# Patient Record
Sex: FEMALE | Race: WHITE | Employment: OTHER | URBAN - METROPOLITAN AREA
[De-identification: names, ages, dates, MRNs, and addresses within clinical notes are randomized per-mention and may not be internally consistent; named-entity substitution may affect disease eponyms.]

---

## 2017-09-13 PROBLEM — S62.346A CLOSED NONDISPLACED FRACTURE OF BASE OF FIFTH METACARPAL BONE OF RIGHT HAND: Status: ACTIVE | Noted: 2017-09-13

## 2017-09-13 PROBLEM — S62.348B: Status: ACTIVE | Noted: 2017-09-13

## 2017-10-20 PROBLEM — M75.41 IMPINGEMENT SYNDROME OF RIGHT SHOULDER: Status: ACTIVE | Noted: 2017-10-20

## 2018-05-24 ENCOUNTER — OFFICE VISIT (OUTPATIENT)
Dept: BARIATRICS/WEIGHT MGMT | Age: 63
End: 2018-05-24
Payer: COMMERCIAL

## 2018-05-24 VITALS
TEMPERATURE: 97.8 F | SYSTOLIC BLOOD PRESSURE: 167 MMHG | BODY MASS INDEX: 30.56 KG/M2 | HEIGHT: 64 IN | WEIGHT: 179 LBS | DIASTOLIC BLOOD PRESSURE: 84 MMHG | RESPIRATION RATE: 20 BRPM | HEART RATE: 64 BPM

## 2018-05-24 DIAGNOSIS — K91.2 MALNUTRITION FOLLOWING GASTROINTESTINAL SURGERY: Primary | ICD-10-CM

## 2018-05-24 PROCEDURE — 99213 OFFICE O/P EST LOW 20 MIN: CPT | Performed by: SURGERY

## 2018-05-24 PROCEDURE — 99212 OFFICE O/P EST SF 10 MIN: CPT

## 2019-01-24 ENCOUNTER — PROCEDURE VISIT (OUTPATIENT)
Dept: AUDIOLOGY | Age: 64
End: 2019-01-24
Payer: COMMERCIAL

## 2019-01-24 ENCOUNTER — OFFICE VISIT (OUTPATIENT)
Dept: ENT CLINIC | Age: 64
End: 2019-01-24
Payer: COMMERCIAL

## 2019-01-24 VITALS
SYSTOLIC BLOOD PRESSURE: 107 MMHG | OXYGEN SATURATION: 98 % | HEART RATE: 69 BPM | WEIGHT: 171 LBS | DIASTOLIC BLOOD PRESSURE: 69 MMHG | HEIGHT: 65 IN | BODY MASS INDEX: 28.49 KG/M2

## 2019-01-24 DIAGNOSIS — H90.3 SENSORINEURAL HEARING LOSS (SNHL) OF BOTH EARS: Primary | ICD-10-CM

## 2019-01-24 DIAGNOSIS — H93.13 TINNITUS, BILATERAL: Primary | ICD-10-CM

## 2019-01-24 PROCEDURE — 99204 OFFICE O/P NEW MOD 45 MIN: CPT | Performed by: OTOLARYNGOLOGY

## 2019-01-24 PROCEDURE — 92567 TYMPANOMETRY: CPT | Performed by: AUDIOLOGIST

## 2019-01-24 PROCEDURE — 92557 COMPREHENSIVE HEARING TEST: CPT | Performed by: AUDIOLOGIST

## 2019-01-24 RX ORDER — KETOTIFEN FUMARATE 0.35 MG/ML
1 SOLUTION/ DROPS OPHTHALMIC 2 TIMES DAILY
COMMUNITY

## 2019-01-24 ASSESSMENT — ENCOUNTER SYMPTOMS
ABDOMINAL PAIN: 0
EYES NEGATIVE: 1
GASTROINTESTINAL NEGATIVE: 1
SHORTNESS OF BREATH: 0
COLOR CHANGE: 0
RESPIRATORY NEGATIVE: 1

## 2019-05-01 ENCOUNTER — TELEPHONE (OUTPATIENT)
Dept: BARIATRICS/WEIGHT MGMT | Age: 64
End: 2019-05-01

## 2019-05-01 NOTE — TELEPHONE ENCOUNTER
I LM for pt to call us back so we could reschedule her appt. . She had LM for us to cancel her 5/23/19 appt with Dr Mode Johnson.

## 2019-05-15 ENCOUNTER — TELEPHONE (OUTPATIENT)
Dept: BARIATRICS/WEIGHT MGMT | Age: 64
End: 2019-05-15

## 2019-06-28 ENCOUNTER — TELEPHONE (OUTPATIENT)
Dept: BARIATRICS/WEIGHT MGMT | Age: 64
End: 2019-06-28

## 2019-06-28 NOTE — LETTER
[facility] Surg Weight   [address] [facility]  Phone: [phone]  Fax: [phone]    Karissa Vargas MD        June 28, 2019    Karen Masters  [address] [place] [id number]      Dear Vlad Trimble: We are reaching out to patients who have missed appointments and are due for bariatric lab work and/or other testing. We hope you are doing well, and would like to schedule a follow up appointment the [facility]. It is very important that you maintain follow up care with your surgeon, and have your routine lab work completed after weight loss surgery. Failure to maintain follow up places you at  increased risk of sub-optimal weight loss or complications. We are here to work with you and guide you to weight loss success. Please contact our office to schedule your follow up appointment. We have appointments available on Wednesday afternoon with Dr. Margarette Galeana, Thursday morning with Dr. Emanuel Brandon, and Friday morning with Dr. Bigg Emery. We will work with you to obtain at date and time that is convenient for you. If you are receiving your bariatric care elsewhere, please notify our office so that we may kamran your records accordingly. We look forward to hearing from you soon. If you have any questions or concerns, please don't hesitate to call.     Sincerely,        Karissa Vargas MD

## 2019-11-19 ENCOUNTER — TELEPHONE (OUTPATIENT)
Dept: BARIATRICS/WEIGHT MGMT | Age: 64
End: 2019-11-19

## 2019-11-25 DIAGNOSIS — K91.2 MALNUTRITION FOLLOWING GASTROINTESTINAL SURGERY: Primary | ICD-10-CM

## 2020-04-13 ENCOUNTER — TELEPHONE (OUTPATIENT)
Dept: BARIATRICS/WEIGHT MGMT | Age: 65
End: 2020-04-13

## 2020-04-13 NOTE — LETTER
[facility] Surg Weight   [address] [facility] [place]  Phone: [phone]  Fax: [phone]    Teodoro Fernandes MD        April 13, 2020    [id number]      Dear Connie Chen:    Kathy Brar are now due for your 5 year appointment as well as bariatric lab work. We hope you are doing well, and would like to schedule a follow up appointment the [facility]. It is very important that you maintain follow up care with your surgeon, and have your routine lab work completed after weight loss surgery. Failure to maintain follow up places you at  increased risk of sub-optimal weight loss or complications. The routine schedule of follow up appointments and labwork after bariatric surgery is as follows:  2 weeks, 6 weeks, 3 months, 6 months, 1 year, 18 months, 2 years and then annually thereafter. We are here to work with you and guide you to weight loss success. Please contact our office to schedule your follow up appointment. We have appointments available on Wednesday afternoon with Dr. Rayo Malave, Thursday morning with Dr. He Deluna, and Friday morning with Dr. Dae Easley. We will work with you to obtain at date and time that is convenient for you. If you are receiving your bariatric care elsewhere, please notify our office so that we may kamran your records accordingly. We look forward to hearing from you soon. If you have any questions or concerns, please don't hesitate to call.     Sincerely,        Teodoro eFrnandes MD

## 2020-09-03 ENCOUNTER — TELEPHONE (OUTPATIENT)
Dept: BARIATRICS/WEIGHT MGMT | Age: 65
End: 2020-09-03

## 2020-09-03 ENCOUNTER — OFFICE VISIT (OUTPATIENT)
Dept: BARIATRICS/WEIGHT MGMT | Age: 65
End: 2020-09-03
Payer: COMMERCIAL

## 2020-09-03 VITALS
WEIGHT: 162.5 LBS | SYSTOLIC BLOOD PRESSURE: 174 MMHG | TEMPERATURE: 97.5 F | RESPIRATION RATE: 20 BRPM | HEART RATE: 60 BPM | HEIGHT: 64 IN | DIASTOLIC BLOOD PRESSURE: 74 MMHG | BODY MASS INDEX: 27.74 KG/M2

## 2020-09-03 PROCEDURE — 99213 OFFICE O/P EST LOW 20 MIN: CPT | Performed by: SURGERY

## 2020-09-03 PROCEDURE — 99211 OFF/OP EST MAY X REQ PHY/QHP: CPT

## 2020-09-03 RX ORDER — ATORVASTATIN CALCIUM 40 MG/1
40 TABLET, FILM COATED ORAL DAILY
COMMUNITY

## 2020-09-03 NOTE — PATIENT INSTRUCTIONS
Please continue to take your vitamin and mineral supplements as instructed. If you received a blood work prescription today for laboratory monitoring due prior to your next routine follow-up visit, please have this blood work obtained 10 to 14 days prior to your next visit. It is important to fast for 12 hours prior to routine weight loss surgery blood work, EXCEPT for drinking water, to ensure accuracy of results. Please report nausea, vomiting, abdominal pain, or any other problems you experience to your surgeon. For problems related to weight loss surgery, it is best to go to [facility] Emergency Department and have your surgeon paged.

## 2020-09-03 NOTE — PROGRESS NOTES
Kiara Hernández  9/3/2020  [address]     Bariatric Office Visit    Kiara Hernández is a 63 y.o. female who weighs 162 lb 8 oz (73.7 kg) 9/3/2020 post Laparoscopic Odalys-en-Y Gastric Bypass 12/8/14. No problems this year other than low iron, doesn't like the iron pills so she was given two IV iron treatments, no labs available. Colonoscopy 2017 was normal.  Reports no problems from the bypass. Had liposuction and arm plastic surgery 2018. She is compliant most of the time with the multivitamins and calcium + Vit D. She is meeting fluid recommendations of at least 64 ounces per day and is meeting protein recommendations. She is exercising: walking. Weights:   162 lb 9/3/2020   179 lb 5/2018  214 lb 12/8/2014 bypass  230 lb 5/2014 initial    Past medical history:  has a past medical history of Arthritis, Chronic back pain, Chronic hip pain, Constipation, H/O chest x-ray, Hyperlipidemia, Hypertension, Nausea & vomiting, PONV (postoperative nausea and vomiting), Ringing in ears, Tension headache, and Type II or unspecified type diabetes mellitus without mention of complication, not stated as uncontrolled. Past surgical history:  has a past surgical history that includes Colonoscopy (2007); Hysterectomy (2005,Dr. Velasquez,[facility]); Total knee arthroplasty (left,2007, Dr. Burak Alvarez); Carpal tunnel release (Bilateral, 2007); joint replacement (2007); Tonsillectomy (1962); Upper gastrointestinal endoscopy (7/9/2014); Cholecystectomy (Aug 2014); Odalys-en-Y Gastric Bypass (12/08/2014); and Total knee arthroplasty (Right, 02/14/2011). Medications:   Prior to Visit Medications    Medication Sig Taking? Authorizing Provider   medical marijuana Take by mouth as needed.  Yes Historical Provider, MD   atorvastatin (LIPITOR) 40 MG tablet Take 40 mg by mouth daily Yes Historical Provider, MD   Ferrous Sulfate (IRON PO) Take 2 tablets by mouth daily Yes Historical Provider, MD   ketotifen (ZADITOR) 0.025 % ophthalmic solution 1 drop 2 times daily Yes Historical Provider, MD   BLACK CURRANT SEED OIL PO Take by mouth daily Yes Historical Provider, MD   omeprazole (PRILOSEC) 20 MG delayed release capsule Take 20 mg by mouth daily as needed  Yes Historical Provider, MD   Multiple Vitamin (MVI, BARIATRIC ADVANTAGE MULTI-FORMULA, CHEW TAB) Take 1 tablet by mouth 2 times daily. Yes Historical Provider, MD   Calcium Citrate-Vitamin D (CA CITR+VIT D,CELEBRATE CALCIUM PLUS 500, TAB) 1 tablet 3 times daily. Yes Historical Provider, MD   Cholecalciferol (VITAMIN D PO) Take 5,000 Units by mouth every other day. Bariatric Yes Historical Provider, MD     Allergies: Allergies   Allergen Reactions    Penicillins Hives and Rash    Sulfa Antibiotics Hives and Rash          Physical exam:   Blood pressure (!) 174/74, pulse 60, temperature 97.5 °F (36.4 °C), temperature source Temporal, resp. rate 20, height 5' 4\" (1.626 m), weight 162 lb 8 oz (73.7 kg). General appearance: alert, appears stated age and cooperative  Head: Normocephalic, without obvious abnormality, atraumatic  Neck: no adenopathy, no carotid bruit, no JVD, supple, symmetrical, trachea midline and thyroid not enlarged, symmetric, no tenderness/mass/nodules  Lungs: clear to auscultation bilaterally  Heart: regular rate and rhythm  Abdomen: soft, non-tender; bowel sounds normal; no masses,  no organomegaly  Extremities: extremities normal, atraumatic, no cyanosis or edema    Assessment: Post Odalys-en- Y Gastric Bypass. She does not complain of GERD unless she takes iron pills,  Never had sleep apnea,  No longer has diabetes,  No longer has hypertension off medical treatment. No constipation. Malnutrition post gastric bypass. Plan:  Need to get back the low calorie, high protein diet. Drink plenty of water and fluids. Make sure to use fiber to keep the bowels regular. Try to exercise 7 days per week.  Always notify the clinic if you have any medical problems. Follow up in 12 months. Repeat colonoscopy in 10 years.       Physician Signature: Electronically signed by Dr. Jose Chaudhary MD

## 2020-09-03 NOTE — PROGRESS NOTES
Pt is here for 5.5 yr LRYGB, labs are scanned into media. Water intake is 32 oz daily, vitamin intake is good, not having regular bowel movements, getting protein through shakes and foods.

## 2020-09-03 NOTE — TELEPHONE ENCOUNTER
Ab Crespo called pt and reviewed we did receive her labs. Ab Crespo reviewed Glucose and Creat. are a little elevated so we want pt to call and discuss her lab values with her PCP. Ab Crespo reviewed labs have been faxed to PCP 's office. Ab Crespo reviewed Vitamin B1 and Vitamin B12 are also elevated. Pt takes an additional Vitamin B12 daily. Ab Crespo instructed pt to hold the Vitamin B12. Pt verbalized understanding. Labs faxed to PCP.

## 2020-09-04 ENCOUNTER — TELEPHONE (OUTPATIENT)
Dept: BARIATRICS/WEIGHT MGMT | Age: 65
End: 2020-09-04

## 2020-10-23 PROBLEM — E44.1 MILD PROTEIN-CALORIE MALNUTRITION (HCC): Status: ACTIVE | Noted: 2020-10-23

## 2021-10-30 ENCOUNTER — HOSPITAL ENCOUNTER (OUTPATIENT)
Age: 66
Discharge: HOME | End: 2021-10-30
Payer: COMMERCIAL

## 2021-10-30 LAB
ALBUMIN SERPL-MCNC: 4.3 G/DL (ref 3.5–5.2)
ALP BLD-CCNC: 99 U/L (ref 35–104)
ALT SERPL-CCNC: 32 U/L (ref 0–32)
ANION GAP SERPL CALCULATED.3IONS-SCNC: 12 MMOL/L (ref 7–16)
AST SERPL-CCNC: 30 U/L (ref 0–31)
BILIRUB SERPL-MCNC: 0.4 MG/DL (ref 0–1.2)
BUN BLDV-MCNC: 19 MG/DL (ref 6–23)
CALCIUM SERPL-MCNC: 10 MG/DL (ref 8.6–10.2)
CHLORIDE BLD-SCNC: 103 MMOL/L (ref 98–107)
CHOLESTEROL, TOTAL: 176 MG/DL (ref 0–199)
CO2: 26 MMOL/L (ref 22–29)
CREAT SERPL-MCNC: 1.1 MG/DL (ref 0.5–1)
FERRITIN: 209 NG/ML
FOLATE: 12.3 NG/ML (ref 4.8–24.2)
GFR AFRICAN AMERICAN: >60
GFR NON-AFRICAN AMERICAN: 50 ML/MIN/1.73
GLUCOSE BLD-MCNC: 166 MG/DL (ref 74–99)
HCT VFR BLD CALC: 39.1 % (ref 34–48)
HEMOGLOBIN: 12.4 G/DL (ref 11.5–15.5)
MCH RBC QN AUTO: 30.5 PG (ref 26–35)
MCHC RBC AUTO-ENTMCNC: 31.7 % (ref 32–34.5)
MCV RBC AUTO: 96.1 FL (ref 80–99.9)
PDW BLD-RTO: 12.3 FL (ref 11.5–15)
PLATELET # BLD: 226 E9/L (ref 130–450)
PMV BLD AUTO: 10.1 FL (ref 7–12)
POTASSIUM SERPL-SCNC: 4.8 MMOL/L (ref 3.5–5)
PREALBUMIN: 23 MG/DL (ref 20–40)
RBC # BLD: 4.07 E12/L (ref 3.5–5.5)
SODIUM BLD-SCNC: 141 MMOL/L (ref 132–146)
TOTAL PROTEIN: 7.2 G/DL (ref 6.4–8.3)
TRIGL SERPL-MCNC: 165 MG/DL (ref 0–149)
VITAMIN B-12: >2000 PG/ML (ref 211–946)
VITAMIN D 25-HYDROXY: 35 NG/ML (ref 30–100)
WBC # BLD: 4.9 E9/L (ref 4.5–11.5)

## 2021-10-30 PROCEDURE — 82607 VITAMIN B-12: CPT

## 2021-10-30 PROCEDURE — 80053 COMPREHEN METABOLIC PANEL: CPT

## 2021-10-30 PROCEDURE — 84630 ASSAY OF ZINC: CPT

## 2021-10-30 PROCEDURE — 84425 ASSAY OF VITAMIN B-1: CPT

## 2021-10-30 PROCEDURE — 84255 ASSAY OF SELENIUM: CPT

## 2021-10-30 PROCEDURE — 82525 ASSAY OF COPPER: CPT

## 2021-10-30 PROCEDURE — 82728 ASSAY OF FERRITIN: CPT

## 2021-10-30 PROCEDURE — 82746 ASSAY OF FOLIC ACID SERUM: CPT

## 2021-10-30 PROCEDURE — 82465 ASSAY BLD/SERUM CHOLESTEROL: CPT

## 2021-10-30 PROCEDURE — 82306 VITAMIN D 25 HYDROXY: CPT

## 2021-10-30 PROCEDURE — 84478 ASSAY OF TRIGLYCERIDES: CPT

## 2021-10-30 PROCEDURE — 36415 COLL VENOUS BLD VENIPUNCTURE: CPT

## 2021-10-30 PROCEDURE — 85027 COMPLETE CBC AUTOMATED: CPT

## 2021-10-30 PROCEDURE — 84134 ASSAY OF PREALBUMIN: CPT

## 2021-11-04 LAB
COPPER: 98.6 UG/DL (ref 80–155)
SELENIUM: 174.5 UG/L (ref 23–190)
VITAMIN B1 WHOLE BLOOD: 140 NMOL/L (ref 70–180)
ZINC: 115.7 UG/DL (ref 60–120)

## 2021-11-18 ENCOUNTER — OFFICE VISIT (OUTPATIENT)
Dept: BARIATRICS/WEIGHT MGMT | Age: 66
End: 2021-11-18
Payer: COMMERCIAL

## 2021-11-18 VITALS
TEMPERATURE: 97.5 F | WEIGHT: 170 LBS | SYSTOLIC BLOOD PRESSURE: 183 MMHG | BODY MASS INDEX: 29.02 KG/M2 | HEIGHT: 64 IN | RESPIRATION RATE: 20 BRPM | HEART RATE: 62 BPM | DIASTOLIC BLOOD PRESSURE: 76 MMHG

## 2021-11-18 DIAGNOSIS — K91.2 MALNUTRITION FOLLOWING GASTROINTESTINAL SURGERY: Primary | ICD-10-CM

## 2021-11-18 PROCEDURE — 99213 OFFICE O/P EST LOW 20 MIN: CPT | Performed by: SURGERY

## 2021-11-18 PROCEDURE — 99211 OFF/OP EST MAY X REQ PHY/QHP: CPT

## 2021-11-18 NOTE — PROGRESS NOTES
Patient is 9 yr LRYGB. Water intake is around 32 oz daily. Protein through shakes and foods. Vitamin intake is getting better. Bowel movements are - getting better.

## 2021-11-18 NOTE — PROGRESS NOTES
Farhan Joseph  11/18/2021  [address]     Bariatric Office Visit    Farhan Joseph is a 61 y.o. female who weighs 170 lb (77.1 kg) post Laparoscopic Odalys-en-Y Gastric Bypass 12/8/14. Still using Omeprazole occasionally for abdominal discomfort. No problems this year other than low iron, doesn't like the iron pills so she was given two IV iron treatments, no labs available. Colonoscopy 2017 was normal.  Reports no problems from the bypass. Had liposuction and arm plastic surgery 2018. She is compliant most of the time with the multivitamins and calcium + Vit D. She is meeting fluid recommendations of at least 64 ounces per day and is meeting protein recommendations. She is exercising: walking. Weights:   170 lb 11/18/2021   162 lb 9/3/2020   179 lb 5/2018  214 lb 12/8/2014 bypass  230 lb 5/2014 initial    Past medical history:  has a past medical history of Arthritis, Chronic back pain, Chronic hip pain, Constipation, H/O chest x-ray, Hyperlipidemia, Hypertension, Nausea & vomiting, PONV (postoperative nausea and vomiting), Ringing in ears, Tension headache, and Type II or unspecified type diabetes mellitus without mention of complication, not stated as uncontrolled. Past surgical history:  has a past surgical history that includes Colonoscopy (2007); Hysterectomy ([facility],Dr. Velasquez,[facility]); Total knee arthroplasty (left,2007, Dr. Maryanna Collet); Carpal tunnel release (Bilateral, 2007); joint replacement (2007); Tonsillectomy (1962); Upper gastrointestinal endoscopy (7/9/2014); Cholecystectomy (Aug 2014); Odalys-en-Y Gastric Bypass (12/08/2014); and Total knee arthroplasty (Right, 02/14/2011). Medications:   Prior to Visit Medications    Medication Sig Taking? Authorizing Provider   medical marijuana Take by mouth as needed.  Yes Historical Provider, MD   atorvastatin (LIPITOR) 40 MG tablet Take 40 mg by mouth daily Yes Historical Provider, MD   Ferrous Sulfate (IRON PO) Take 2 tablets by mouth daily Yes Historical Provider, MD   ketotifen (ZADITOR) 0.025 % ophthalmic solution 1 drop 2 times daily Yes Historical Provider, MD   BLACK CURRANT SEED OIL PO Take by mouth daily Yes Historical Provider, MD   omeprazole (PRILOSEC) 20 MG delayed release capsule Take 20 mg by mouth daily as needed  Yes Historical Provider, MD   Multiple Vitamin (MVI, BARIATRIC ADVANTAGE MULTI-FORMULA, CHEW TAB) Take 1 tablet by mouth 2 times daily. Yes Historical Provider, MD   Calcium Citrate-Vitamin D (CA CITR+VIT D,CELEBRATE CALCIUM PLUS 500, TAB) 1 tablet 3 times daily. Yes Historical Provider, MD   Cholecalciferol (VITAMIN D PO) Take 5,000 Units by mouth every other day. Bariatric Yes Historical Provider, MD     Allergies: Allergies   Allergen Reactions    Penicillins Hives and Rash    Sulfa Antibiotics Hives and Rash          Physical exam:   Blood pressure (!) 183/76, pulse 62, temperature 97.5 °F (36.4 °C), temperature source Temporal, resp. rate 20, height 5' 4\" (1.626 m), weight 170 lb (77.1 kg). General appearance: alert, appears stated age and cooperative  Head: Normocephalic, without obvious abnormality, atraumatic  Neck: no adenopathy, no carotid bruit, no JVD, supple, symmetrical, trachea midline and thyroid not enlarged, symmetric, no tenderness/mass/nodules  Lungs: clear to auscultation bilaterally  Heart: regular rate and rhythm  Abdomen: soft, non-tender; bowel sounds normal; no masses,  no organomegaly  Extremities: extremities normal, atraumatic, no cyanosis or edema    Assessment: Post Odalys-en- Y Gastric Bypass. She does complain of GERD unless she takes Omeprazole,  Never had sleep apnea,  No longer has diabetes,  No longer has hypertension off medical treatment. No constipation. Malnutrition post gastric bypass. Plan:  Wean off Omeprazole, use Carafate, Mylanta or Pepcid if needed. Need to get back the low calorie, high protein diet. Drink plenty of water and fluids.  Make sure to use fiber to keep the bowels regular. Try to exercise 7 days per week. Always notify the clinic if you have any medical problems. Follow up in 12 months.        Physician Signature: Electronically signed by Dr. Marcelino Carter MD

## 2021-11-18 NOTE — PATIENT INSTRUCTIONS
Please continue to take your vitamin and mineral supplements as instructed. If you received a blood work prescription today for laboratory monitoring due prior to your next routine follow-up visit, please have this blood work obtained 10 to 14 days prior to your next visit. It is important to fast for 12 hours prior to routine weight loss surgery blood work, EXCEPT for drinking water, to ensure accuracy of results. Please report nausea, vomiting, abdominal pain, or any other problems you experience to your surgeon. For problems related to weight loss surgery, it is best to go to [address] [place] [address] Emergency Department and have your surgeon paged.

## 2022-06-15 ENCOUNTER — OFFICE VISIT (OUTPATIENT)
Dept: ORTHOPEDIC SURGERY | Age: 67
End: 2022-06-15
Payer: MEDICARE

## 2022-06-15 VITALS — WEIGHT: 167 LBS | HEIGHT: 64 IN | BODY MASS INDEX: 28.51 KG/M2

## 2022-06-15 DIAGNOSIS — M25.551 RIGHT HIP PAIN: Primary | ICD-10-CM

## 2022-06-15 PROCEDURE — G8427 DOCREV CUR MEDS BY ELIG CLIN: HCPCS | Performed by: ORTHOPAEDIC SURGERY

## 2022-06-15 PROCEDURE — 1123F ACP DISCUSS/DSCN MKR DOCD: CPT | Performed by: ORTHOPAEDIC SURGERY

## 2022-06-15 PROCEDURE — G8399 PT W/DXA RESULTS DOCUMENT: HCPCS | Performed by: ORTHOPAEDIC SURGERY

## 2022-06-15 PROCEDURE — 99203 OFFICE O/P NEW LOW 30 MIN: CPT | Performed by: ORTHOPAEDIC SURGERY

## 2022-06-15 PROCEDURE — 1090F PRES/ABSN URINE INCON ASSESS: CPT | Performed by: ORTHOPAEDIC SURGERY

## 2022-06-15 PROCEDURE — 3017F COLORECTAL CA SCREEN DOC REV: CPT | Performed by: ORTHOPAEDIC SURGERY

## 2022-06-15 PROCEDURE — G8419 CALC BMI OUT NRM PARAM NOF/U: HCPCS | Performed by: ORTHOPAEDIC SURGERY

## 2022-06-15 PROCEDURE — 1036F TOBACCO NON-USER: CPT | Performed by: ORTHOPAEDIC SURGERY

## 2022-06-15 RX ORDER — VITAMIN E 268 MG
400 CAPSULE ORAL EVERY OTHER DAY
COMMUNITY

## 2022-06-15 RX ORDER — ZINC GLUCONATE 50 MG
50 TABLET ORAL DAILY
COMMUNITY
End: 2022-07-20

## 2022-06-15 RX ORDER — MELATONIN 10 MG
10 CAPSULE ORAL NIGHTLY PRN
COMMUNITY

## 2022-06-15 NOTE — PROGRESS NOTES
Chief Complaint:   Chief Complaint   Patient presents with    Hip Pain     Posterior Right hip pain x years off and on. Difficulty walking. Pain has increased over the past 6 months, now constant. Hx spinal stenosis. MELISA Rodarte is a 74 y.o. female, who presents with chronic relapsing pain at the posterior aspect of the low back and hip, some radiation toward the lateral aspect at times. It has progressed now more frequently over the past 6 months at times interfering with her daily activities of standing walking and self-care. She does give a history of significant spinal issues with stenosis and stiffness, has had injections in the remote past without much relief in that regard. Denies radiating pain or numbness down the lower extremities, no other joint complaints. She is status post staged bilateral total knee arthroplasties in the past doing well in those regards. Allergies; medications; past medical, surgical, family, and social history; and problem list have been reviewed today and updated as indicated in this encounter - see below following Ortho specifics. Musculoskeletal: Leg lengths are equal hip motion is normal bilaterally. There is vague tenderness to palpation about the posterior aspect of the pelvis and hip, minimal lateral pain today, no groin pain with range of motion or weightbearing. Knees are aligned straight and stable without laxity deformity effusion. Radiologic Studies: AP x-ray of the pelvis including AP lateral of the right hip were obtained today, joint spaces are preserved without narrowing sclerosis or osteophyte formation, there is however rather extensive degenerative disease noted in the visualized portion of lower lumbar sacral spine. ASSESSMENT/PLAN:    Mary Kate Rosas was seen today for hip pain.     Diagnoses and all orders for this visit:    Right hip pain  -     [facility] [address] [place] [address], DO Francisco Javier, Physical Medicine and Rehabilitation, [address] the patient my pression her pain is likely more central in origin from her significant lumbar sacral issues, she was reassured as to the integrity of her hips however and referred for physical medicine rehab evaluation and possible treatment. Return if symptoms worsen or fail to improve. Alexa Miller MD    6/15/2022  2:49 PM      Patient Active Problem List   Diagnosis    Hypertension    Hyperlipidemia    Chronic back pain    Type II or unspecified type diabetes mellitus without mention of complication, not stated as uncontrolled    Arthritis    Cholecystitis with cholelithiasis    Gastric bypass     Closed nondisplaced fracture of base of fifth metacarpal bone of right hand    Impingement syndrome of right shoulder    Mild protein-calorie malnutrition ([address]       Past Medical History:   Diagnosis Date    Arthritis     Chronic back pain     Chronic hip pain     Constipation     H/O chest x-ray     Hyperlipidemia     Hypertension     Nausea & vomiting     PONV (postoperative nausea and vomiting)     Ringing in ears     Spinal stenosis     Tension headache     Type II or unspecified type diabetes mellitus without mention of complication, not stated as uncontrolled        Past Surgical History:   Procedure Laterality Date    CARPAL TUNNEL RELEASE Bilateral 2007    Dr. Brigid Gillis  Aug 2014    Laparoscopic    COLONOSCOPY  2007    HYSTERECTOMY (CERVIX STATUS UNKNOWN)  [facility]    JOINT REPLACEMENT  2007    lt knee     WESLEY-EN-Y GASTRIC BYPASS  12/08/2014    Laparoscopic    TONSILLECTOMY  1962    TOTAL KNEE ARTHROPLASTY  left,2007, Dr. Carlota Mckeon Right 02/14/2011    Right TKA JOSEPH Reza MD    UPPER GASTROINTESTINAL ENDOSCOPY  7/9/2014    Dr. Dakota Foley, [facility], Findings: Mild Gastritis       Current Outpatient Medications   Medication Sig activity: Yes   Other Topics Concern    None   Social History Narrative    None     Social Determinants of Health     Financial Resource Strain:     Difficulty of Paying Living Expenses: Not on file   Food Insecurity:     Worried About Running Out of Food in the Last Year: Not on file    Ros[place] of Food in the Last Year: Not on file   Transportation Needs:     Lack of Transportation (Medical): Not on file    Lack of Transportation (Non-Medical): Not on file   Physical Activity:     Days of Exercise per Week: Not on file    Minutes of Exercise per Session: Not on file   Stress:     Feeling of Stress : Not on file   Social Connections:     Frequency of Communication with Friends and Family: Not on file    Frequency of Social Gatherings with Friends and Family: Not on file    Attends Gnosticism Services: Not on file    Active Member of [address] [place] [address] or Organizations: Not on file    Attends Club or Organization Meetings: Not on file    Marital Status: Not on file   Intimate Partner Violence:     Fear of Current or Ex-Partner: Not on file    Emotionally Abused: Not on file    Physically Abused: Not on file    Sexually Abused: Not on file   Housing Stability:     Unable to Pay for Housing in the Last Year: Not on file    Number of [place] in the Last Year: Not on file    Unstable Housing in the Last Year: Not on file       Family History   Problem Relation Age of Onset    High Blood Pressure Mother     Diabetes Mother     Diabetes Father     High Blood Pressure Father     High Blood Pressure Brother     Diabetes Paternal Grandfather          Review of Systems  As follows except as previously noted in HPI:  Constitutional: Negative for chills, diaphoresis, fatigue, fever and unexpected weight change. Respiratory: Negative for cough, shortness of breath and wheezing. Cardiovascular: Negative for chest pain and palpitations. Neurological: Negative for dizziness, syncope, cephalgia.   GI / :

## 2022-07-05 ENCOUNTER — OFFICE VISIT (OUTPATIENT)
Dept: NEUROSURGERY | Age: 67
End: 2022-07-05
Payer: MEDICARE

## 2022-07-05 VITALS
WEIGHT: 168.5 LBS | OXYGEN SATURATION: 97 % | TEMPERATURE: 98.3 F | HEIGHT: 65 IN | DIASTOLIC BLOOD PRESSURE: 69 MMHG | RESPIRATION RATE: 16 BRPM | BODY MASS INDEX: 28.07 KG/M2 | SYSTOLIC BLOOD PRESSURE: 139 MMHG

## 2022-07-05 DIAGNOSIS — M54.16 LUMBAR RADICULOPATHY: Primary | ICD-10-CM

## 2022-07-05 PROCEDURE — 1123F ACP DISCUSS/DSCN MKR DOCD: CPT | Performed by: PHYSICIAN ASSISTANT

## 2022-07-05 PROCEDURE — 1036F TOBACCO NON-USER: CPT | Performed by: PHYSICIAN ASSISTANT

## 2022-07-05 PROCEDURE — G8427 DOCREV CUR MEDS BY ELIG CLIN: HCPCS | Performed by: PHYSICIAN ASSISTANT

## 2022-07-05 PROCEDURE — 1090F PRES/ABSN URINE INCON ASSESS: CPT | Performed by: PHYSICIAN ASSISTANT

## 2022-07-05 PROCEDURE — 99204 OFFICE O/P NEW MOD 45 MIN: CPT | Performed by: PHYSICIAN ASSISTANT

## 2022-07-05 PROCEDURE — G8399 PT W/DXA RESULTS DOCUMENT: HCPCS | Performed by: PHYSICIAN ASSISTANT

## 2022-07-05 PROCEDURE — G8419 CALC BMI OUT NRM PARAM NOF/U: HCPCS | Performed by: PHYSICIAN ASSISTANT

## 2022-07-05 PROCEDURE — 3017F COLORECTAL CA SCREEN DOC REV: CPT | Performed by: PHYSICIAN ASSISTANT

## 2022-07-05 RX ORDER — GABAPENTIN 300 MG/1
300 CAPSULE ORAL 3 TIMES DAILY
Qty: 90 CAPSULE | Refills: 3 | Status: SHIPPED
Start: 2022-07-05 | End: 2022-08-18 | Stop reason: ALTCHOICE

## 2022-07-05 ASSESSMENT — ENCOUNTER SYMPTOMS
BACK PAIN: 1
RESPIRATORY NEGATIVE: 1
EYES NEGATIVE: 1
ALLERGIC/IMMUNOLOGIC NEGATIVE: 1
GASTROINTESTINAL NEGATIVE: 1

## 2022-07-05 NOTE — PROGRESS NOTES
Subjective:      Patient ID: Mateo Almonte is a 74 y.o. female. Back Pain  This is a chronic problem. Episode onset: 14 years ago. The problem occurs constantly. The problem has been gradually worsening since onset. The pain is present in the lumbar spine. The quality of the pain is described as aching. The pain is at a severity of 8/10. The symptoms are aggravated by twisting, standing, sitting and bending. Treatments tried: injections, motrin 800. The treatment provided mild relief. Review of Systems   Constitutional: Negative. HENT: Negative. Eyes: Negative. Respiratory: Negative. Cardiovascular: Negative. Gastrointestinal: Negative. Endocrine: Negative. Genitourinary: Negative. Musculoskeletal: Positive for back pain. Skin: Negative. Allergic/Immunologic: Negative. Neurological: Negative. Hematological: Negative. Psychiatric/Behavioral: Negative. Objective:   Physical Exam  Constitutional:       Appearance: Normal appearance. HENT:      Head: Normocephalic and atraumatic. Nose: Nose normal.   Eyes:      Pupils: Pupils are equal, round, and reactive to light. Pulmonary:      Effort: Pulmonary effort is normal.   Abdominal:      General: There is no distension. Skin:     General: Skin is warm and dry. Neurological:      Mental Status: She is alert. GCS: GCS eye subscore is 4. GCS verbal subscore is 5. GCS motor subscore is 6. Cranial Nerves: Cranial nerves are intact. Sensory: Sensation is intact. Motor: Motor function is intact. Gait: Gait is intact. Deep Tendon Reflexes: Reflexes are normal and symmetric. Psychiatric:         Mood and Affect: Mood normal.         Assessment:      74year old female with with severe low back and right thigh pain x 14 years. She has a dated MRI from 2019 that shows severe degenerative changes and stenosis. Plan: We will order PT, JOSE, gabapentin, and updated MRI.   If conservative measures fail, she may benefit from a lumbar fusion.         ROSS Mayorga

## 2022-07-11 ENCOUNTER — HOSPITAL ENCOUNTER (OUTPATIENT)
Dept: MRI IMAGING | Age: 67
Discharge: HOME | End: 2022-07-13
Payer: MEDICARE

## 2022-07-11 DIAGNOSIS — M54.16 LUMBAR RADICULOPATHY: ICD-10-CM

## 2022-07-11 PROCEDURE — 72148 MRI LUMBAR SPINE W/O DYE: CPT

## 2022-07-20 ENCOUNTER — PREP FOR PROCEDURE (OUTPATIENT)
Dept: PAIN MANAGEMENT | Age: 67
End: 2022-07-20

## 2022-07-20 ENCOUNTER — OFFICE VISIT (OUTPATIENT)
Dept: PAIN MANAGEMENT | Age: 67
End: 2022-07-20
Payer: MEDICARE

## 2022-07-20 VITALS
DIASTOLIC BLOOD PRESSURE: 72 MMHG | SYSTOLIC BLOOD PRESSURE: 138 MMHG | HEART RATE: 61 BPM | TEMPERATURE: 96.9 F | BODY MASS INDEX: 27.99 KG/M2 | HEIGHT: 65 IN | OXYGEN SATURATION: 99 % | WEIGHT: 168 LBS | RESPIRATION RATE: 16 BRPM

## 2022-07-20 DIAGNOSIS — M54.16 LUMBAR RADICULAR PAIN: Primary | ICD-10-CM

## 2022-07-20 DIAGNOSIS — M51.36 DDD (DEGENERATIVE DISC DISEASE), LUMBAR: ICD-10-CM

## 2022-07-20 DIAGNOSIS — Z98.84 S/P GASTRIC BYPASS: ICD-10-CM

## 2022-07-20 DIAGNOSIS — M48.061 SPINAL STENOSIS OF LUMBAR REGION, UNSPECIFIED WHETHER NEUROGENIC CLAUDICATION PRESENT: Primary | ICD-10-CM

## 2022-07-20 DIAGNOSIS — M47.817 LUMBOSACRAL SPONDYLOSIS WITHOUT MYELOPATHY: ICD-10-CM

## 2022-07-20 DIAGNOSIS — M48.062 SPINAL STENOSIS OF LUMBAR REGION WITH NEUROGENIC CLAUDICATION: ICD-10-CM

## 2022-07-20 DIAGNOSIS — M54.16 LUMBAR RADICULAR PAIN: ICD-10-CM

## 2022-07-20 PROBLEM — M51.369 DDD (DEGENERATIVE DISC DISEASE), LUMBAR: Status: ACTIVE | Noted: 2022-07-20

## 2022-07-20 PROCEDURE — G8427 DOCREV CUR MEDS BY ELIG CLIN: HCPCS | Performed by: ANESTHESIOLOGY

## 2022-07-20 PROCEDURE — 1123F ACP DISCUSS/DSCN MKR DOCD: CPT | Performed by: ANESTHESIOLOGY

## 2022-07-20 PROCEDURE — G8399 PT W/DXA RESULTS DOCUMENT: HCPCS | Performed by: ANESTHESIOLOGY

## 2022-07-20 PROCEDURE — 99204 OFFICE O/P NEW MOD 45 MIN: CPT | Performed by: ANESTHESIOLOGY

## 2022-07-20 PROCEDURE — 3017F COLORECTAL CA SCREEN DOC REV: CPT | Performed by: ANESTHESIOLOGY

## 2022-07-20 PROCEDURE — 1090F PRES/ABSN URINE INCON ASSESS: CPT | Performed by: ANESTHESIOLOGY

## 2022-07-20 PROCEDURE — G8417 CALC BMI ABV UP PARAM F/U: HCPCS | Performed by: ANESTHESIOLOGY

## 2022-07-20 PROCEDURE — 1036F TOBACCO NON-USER: CPT | Performed by: ANESTHESIOLOGY

## 2022-07-20 RX ORDER — CHOLECALCIFEROL (VITAMIN D3) 125 MCG
500 CAPSULE ORAL EVERY OTHER DAY
COMMUNITY

## 2022-07-20 RX ORDER — SODIUM CHLORIDE 0.9 % (FLUSH) 0.9 %
5-40 SYRINGE (ML) INJECTION PRN
Status: CANCELLED | OUTPATIENT
Start: 2022-07-20

## 2022-07-20 RX ORDER — SODIUM CHLORIDE 0.9 % (FLUSH) 0.9 %
5-40 SYRINGE (ML) INJECTION EVERY 12 HOURS SCHEDULED
Status: CANCELLED | OUTPATIENT
Start: 2022-07-20

## 2022-07-20 RX ORDER — SODIUM CHLORIDE 9 MG/ML
INJECTION, SOLUTION INTRAVENOUS PRN
Status: CANCELLED | OUTPATIENT
Start: 2022-07-20

## 2022-07-20 NOTE — PROGRESS NOTES
[facility]  INSTRUCTIONS  . .......................................................................................................................................... [x] Parking the day of Surgery is located in the [facility].   Upon entering the door, make immediate right into the surgery reception room    [x]  Bring photo ID and insurance card     [x] You may have a light breakfast day of procedure    [x]  Wear loose comfortable clothing    [x]  Please follow instructions for medications as given per Dr's office    [x] You can expect a call the business day prior to procedure to notify you of your arrival time     [x] Please arrange for     []  Other instructions

## 2022-07-20 NOTE — PROGRESS NOTES
.    Patient:  Jenni Parrish,  1955  Date of Service:  22      Do you currently have any of the following:    Fever: No  Headache:  No  Cough: No  Shortness of breath: No  Exposed to anyone with these symptoms: No       Patient presents with complaints of lower back pain that started 15 years ago and has been getting worse. She states the pain began following No specific cause    Pain is constant and is described as stabbing, sharp, and burning. She rates the pain as a 10/10 on her worst day , 3/10 on her best day, and a 8/10 on average on the VAS scale. Pain does radiate to right leg. She  has tingling of the right leg. Alleviating factors include: nothing. Aggravating factors include:  movement, walking, standing, sitting, bending, lying down, lifting. She states that the pain does keep her from sleeping at night. She took her last dose of Neurontin, Motrin, and marijuana gummies  . She is  on NSAIDS and  is not on anticoagulation medications to include none and is managed by NA. Previous treatments: Nerve block. Personal Expectations from this treatment: increase activity and decrease pain    /72   Pulse 61   Temp 96.9 °F (36.1 °C) (Infrared)   Resp 16   Ht 5' 5\" (1.651 m)   Wt 168 lb (76.2 kg)   SpO2 99%   BMI 27.96 kg/m²     No LMP recorded. Patient has had a hysterectomy.

## 2022-07-20 NOTE — PROGRESS NOTES
[facility]        [address] [facility], [address] [facility]      [phone]                  Consult Note      Patient:  Hany Pompa,  1955    Date of Service:  22     Requesting Physician:  ROSS Echevarria    Reason for Consult:      Patient presents with complaints of Low back pain    HISTORY OF PRESENT ILLNESS:      Ms. Hany Pompa is a 74 y.o. female [facility] for evaluation of  chronic low back pain for many years. Low back pain over the right lower lumbar area and intermittent right LE pain mainly over the right thigh. Tingling/ numbness + right thigh intermittently. Pain is constant and is described as aching and throbbing. Patient does not have bladder or bowel dysfunction. Alleviating factors include: rest.  Aggravating factors include: movement, bending, lifting. Pain causes functional limitations/ limits Adl's : Yes    Prior treatment at [facility]- in the past.    Has been evaluated by ortho recently to r/o hip pathology. Has been recently evaluated buy NSG - had MRI of LS spine and referred for interventions. Notes:  S/P Gastric bypass surgery. On Medical Marijuana. Previous treatments:   Physical Therapy /Chiropractic treatment/ HEP: yes,     Medications: - NSAID's : yes - caution due to h/o gastric bypass            - Membrane stabilizers : yes - gabapentin            - Opioids : no            - Adjuvants or Others : yes,    Spine Surgeries: no    Anticoagulation medications: no.    H/O Smoking: no  H/O alcohol abuse : no  H/O Illicit drug use : denies. Currently Uses medical marijuana    Employment: retired    Imaging:   MRI of [facility]: 2022:  Impression   1. No fracture or bony destructive lesion. 2. Severe central canal stenosis at L3-4. Moderate stenoses at L1-2 and   L2-3.   Mild stenoses at L4-5 and L5-S1.   3.  Multilevel neural foraminal stenoses, worst (severe) at the left L3-4,   right L4-5 and bilateral L5-S1 levels. 4. Multilevel stenoses of the lateral recess, worse (severe) at the right   L5-S1 level, resulting in significant impingement of the right S1 nerve. Past Medical History:   Diagnosis Date    Arthritis     Chronic back pain     Chronic hip pain     Constipation     H/O chest x-ray     Hyperlipidemia     Hypertension     Nausea & vomiting     PONV (postoperative nausea and vomiting)     Ringing in ears     Spinal stenosis     Tension headache     Type II or unspecified type diabetes mellitus without mention of complication, not stated as uncontrolled        Past Surgical History:   Procedure Laterality Date    CARPAL TUNNEL RELEASE Bilateral 2007    Dr. Jo Ann Abbott  Aug 2014    Laparoscopic    COLONOSCOPY  2007    HYSTERECTOMY (CERVIX STATUS UNKNOWN)  2005,Dr. Velasquez,[facility]    JOINT REPLACEMENT  2007    lt knee     WESLEY-EN-Y GASTRIC BYPASS  12/08/2014    Laparoscopic    TONSILLECTOMY  1962    TOTAL KNEE ARTHROPLASTY  left,2007, Dr. Dela Cruz Clear Right 02/14/2011    Right TKA JOSEPH Du MD    UPPER GASTROINTESTINAL ENDOSCOPY  7/9/2014    Dr. Gerry Oconnell, [facility], Findings: Mild Gastritis       Prior to Admission medications    Medication Sig Start Date End Date Taking? Authorizing Provider   vitamin B-12 (CYANOCOBALAMIN) 500 MCG tablet Take 500 mcg by mouth in the morning. Yes Historical Provider, MD   gabapentin (NEURONTIN) 300 MG capsule Take 1 capsule by mouth 3 times daily for 30 days.  7/5/22 8/4/22 Yes ROSS Wood   Famotidine-Ca Carb-Mag Hydrox (PEPCID COMPLETE PO) Take 1 tablet by mouth daily as needed    Yes Historical Provider, MD   vitamin E 400 UNIT capsule Take 400 Units by mouth every other day    Yes Historical Provider, MD   melatonin 10 MG CAPS capsule Take 10 mg by mouth nightly as needed    Yes Historical Provider, MD medical marijuana Take by mouth as needed. Gilford Deck  / Ohpollo Clink   Yes Historical Provider, MD   atorvastatin (LIPITOR) 40 MG tablet Take 40 mg by mouth daily   Yes Historical Provider, MD   Ferrous Sulfate (IRON PO) Take 1 tablet by mouth every 3 days    Yes Historical Provider, MD   ketotifen (ZADITOR) 0.025 % ophthalmic solution 1 drop 2 times daily   Yes Historical Provider, MD   BLACK CURRANT SEED OIL PO Take by mouth daily   Yes Historical Provider, MD   Multiple Vitamin (MVI, BARIATRIC ADVANTAGE MULTI-FORMULA, CHEW TAB) Take 1 tablet by mouth 2 times daily    Yes Historical Provider, MD   Calcium Citrate-Vitamin D (CA CITR+VIT D,CELEBRATE CALCIUM PLUS 500, TAB) 1 tablet 3 times daily. Yes Historical Provider, MD   Cholecalciferol (VITAMIN D PO) Take 5,000 Units by mouth every other day. Bariatric   Yes Historical Provider, MD   zinc 50 MG TABS tablet Take 50 mg by mouth daily  Patient not taking: Reported on 2022    Historical Provider, MD   omeprazole (PRILOSEC) 20 MG delayed release capsule Take 20 mg by mouth daily as needed   Patient not taking: Reported on 6/15/2022    Historical Provider, MD       Allergies   Allergen Reactions    Penicillins Hives and Rash    Sulfa Antibiotics Hives and Rash       Social History     Socioeconomic History    Marital status: Single     Spouse name: Not on file    Number of children: Not on file    Years of education: Not on file    Highest education level: Not on file   Occupational History    Not on file   Tobacco Use    Smoking status: Former     Packs/day: 1.50     Years: 4.00     Pack years: 6.00     Types: Cigarettes     Quit date: 1977     Years since quittin.1    Smokeless tobacco: Never   Substance and Sexual Activity    Alcohol use:  Yes     Alcohol/week: 0.8 standard drinks     Types: 1 Standard drinks or equivalent per week     Comment: Occasionally    Drug use: Yes     Types: Marijuana Jose Bath)     Comment: Medical Marijuana (nightly)    Sexual activity: Yes   Other Topics Concern    Not on file   Social History Narrative    Not on file     Social Determinants of Health     Financial Resource Strain: Not on file   Food Insecurity: Not on file   Transportation Needs: Not on file   Physical Activity: Not on file   Stress: Not on file   Social Connections: Not on file   Intimate Partner Violence: Not on file   Housing Stability: Not on file       Family History   Problem Relation Age of Onset    High Blood Pressure Mother     Diabetes Mother     Diabetes Father     High Blood Pressure Father     High Blood Pressure Brother     Diabetes Paternal Grandfather        REVIEW OF SYSTEMS:     Patient specifically denies fever/chills, chest pain, shortness of breath, new bowel or bladder complaints. All other review of systems was negative. Review of Systems - Documented reviewed    PHYSICAL EXAMINATION:      /72   Pulse 61   Temp 96.9 °F (36.1 °C) (Infrared)   Resp 16   Ht 5' 5\" (1.651 m)   Wt 168 lb (76.2 kg)   SpO2 99%   BMI 27.96 kg/m²     General:      General appearance:  Pleasant and well-hydrated, in no distress and A & O x 3  Build:Overweight  Function: Rises from seated position easily    HEENT:    Head:normocephalic, atraumatic  Pupils:regular, round, equal  Sclera: icterus absent    Lungs:    Breathing:normal breathing pattern     CVS:     RRR    Abdomen:    Shape:non-distended and normal    Cervical spine:    Inspection:normal  Palpation:tenderness paravertebral muscles, tenderness trapezium, left, right : no  Range of motion:Normal flexion, extension, rotation bilaterally and is not painful. Spurling's: negative bilaterally    Thoracic spine:     Spine inspection:normal   Palpation:No tenderness over the midline and paraspinal area, bilaterally  Range of motion:normal in flexion, extension rotation bilateral and is not painful.     Lumbar spine:    Spine inspection: Normal   Palpation: Tenderness paravertebral muscles No bilaterally  Range of motion: Decreased, flexion Decreased, Lateral bending, extension and rotation bilaterally reduced. Sacroiliac joint tenderness No bilaterally  JANET test: negative bilaterally  Gaenslen's test:negative bilaterally   Piriformis tenderness: negative bilaterally  SLR : negative bilaterally    Musculoskeletal:    Trigger points No     Extremities:    Tremors:None bilaterally upper and lower  Edema:no    Neurological:    Sensory: Normal to light touch     Motor:   Right  5/5              Left  5/5               Right Bicep 5/5           Left Bicep 5/5              Right Triceps 5/5       Left Triceps 5/5          Right Deltoid 5/5     Left Deltoid 5/5                  Right Quadriceps 5/5          Left Quadriceps 5/5           Right Gastrocnemius 5/5    Left Gastrocnemius 5/5  Right Ant Tibialis 5/5  Left Ant Tibialis 5/5    Dermatology:    Skin:no rashes or lesions noted    Assessment/Plan:     Diagnosis Orders   1. Spinal stenosis of lumbar region, unspecified whether neurogenic claudication present        2. DDD (degenerative disc disease), lumbar        3. Lumbar radicular pain        4. Lumbosacral spondylosis without myelopathy        5. S/P gastric bypass              74 y.o. female with h/o low back pain and right LE pain. Failed conservative treatment    MRI LS spine : multi level DDD/ stenosis/ foraminal narrowing. Has been evaluated by NSG- referred for interventions    S/P Gastric bypass    On Medical Marijuana. On gabapentin. PLAN:    Lumbar TFESI right L3 & L4 under fluoroscopy. RBA discussed - patient agreed. If technical difficulty/ no help, consider interlaminar JOSE. Physical therapy. If JOSE does not help, consider NSG re-eval.    Counseling : Patient encouraged to stay active and to continue Regular home exercise program as tolerated - stretching / strengthening.     Treatment plan discussed with the patient including medication and procedure side effects. Controlled Substances Monitoring:   OARRS reviewed- medical marijuana. Miki Larson MD    Dear Mr. Mulugeta Davis,   Thank you for referring Ms. Arminda Zuñiga and allowing us to participate in her care. Please do not hesitate to contact me if you have any questions regarding her care. Laina Campos MD    CC:    Libby Pa, [address] [facility].   [place],  [address] [facility]     Barrington Ramirez, [address] [facility] [address]

## 2022-07-20 NOTE — H&P (VIEW-ONLY)
[address]        [address] [facility], [address] [place] [address]      [phone]                  Consult Note      Patient:  Jennifer Espino,  1955    Date of Service:  22     Requesting Physician:  ROSS Partida    Reason for Consult:      Patient presents with complaints of Low back pain    HISTORY OF PRESENT ILLNESS:      Ms. Jennifer Espino is a 74 y.o. female presented [facility] to [facility] for evaluation of  chronic low back pain for many years. Low back pain over the right lower lumbar area and intermittent right LE pain mainly over the right thigh. Tingling/ numbness + right thigh intermittently. Pain is constant and is described as aching and throbbing. Patient does not have bladder or bowel dysfunction. Alleviating factors include: rest.  Aggravating factors include: movement, bending, lifting. Pain causes functional limitations/ limits Adl's : Yes    Prior treatment at [facility]- in the past.    Has been evaluated by ortho recently to r/o hip pathology. Has been recently evaluated buy NSG - had MRI of LS spine and referred for interventions. Notes:  S/P Gastric bypass surgery. On Medical Marijuana. Previous treatments:   Physical Therapy /Chiropractic treatment/ HEP: yes,     Medications: - NSAID's : yes - caution due to h/o gastric bypass            - Membrane stabilizers : yes - gabapentin            - Opioids : no            - Adjuvants or Others : yes,    Spine Surgeries: no    Anticoagulation medications: no.    H/O Smoking: no  H/O alcohol abuse : no  H/O Illicit drug use : denies. Currently Uses medical marijuana    Employment: retired    Imaging:   MRI of [facility] 80: 2022:  Impression   1. No fracture or bony destructive lesion. 2. Severe central canal stenosis at L3-4. Moderate stenoses at L1-2 and   L2-3.   Mild stenoses at L4-5 and L5-S1.   3.  Multilevel neural foraminal stenoses, worst (severe) at the left L3-4,   right L4-5 and bilateral L5-S1 levels. 4. Multilevel stenoses of the lateral recess, worse (severe) at the right   L5-S1 level, resulting in significant impingement of the right S1 nerve. Past Medical History:   Diagnosis Date    Arthritis     Chronic back pain     Chronic hip pain     Constipation     H/O chest x-ray     Hyperlipidemia     Hypertension     Nausea & vomiting     PONV (postoperative nausea and vomiting)     Ringing in ears     Spinal stenosis     Tension headache     Type II or unspecified type diabetes mellitus without mention of complication, not stated as uncontrolled        Past Surgical History:   Procedure Laterality Date    CARPAL TUNNEL RELEASE Bilateral 2007    Dr. Chelo Ennis  Aug 2014    Laparoscopic    COLONOSCOPY  2007    HYSTERECTOMY (CERVIX STATUS UNKNOWN)  [facility],Dr. Velasquez,[facility]    JOINT REPLACEMENT  2007    lt knee     WESLEY-EN-Y GASTRIC BYPASS  12/08/2014    Laparoscopic    TONSILLECTOMY  1962    TOTAL KNEE ARTHROPLASTY  left,2007, Dr. Castaneda Class Right 02/14/2011    Right TKA JOSEPH Dubois MD    UPPER GASTROINTESTINAL ENDOSCOPY  7/9/2014    Dr. Martha Mon, [facility], Findings: Mild Gastritis       Prior to Admission medications    Medication Sig Start Date End Date Taking? Authorizing Provider   vitamin B-12 (CYANOCOBALAMIN) 500 MCG tablet Take 500 mcg by mouth in the morning. Yes Historical Provider, MD   gabapentin (NEURONTIN) 300 MG capsule Take 1 capsule by mouth 3 times daily for 30 days.  7/5/22 8/4/22 Yes ROSS Booth   Famotidine-Ca Carb-Mag Hydrox (PEPCID COMPLETE PO) Take 1 tablet by mouth daily as needed    Yes Historical Provider, MD   vitamin E 400 UNIT capsule Take 400 Units by mouth every other day    Yes Historical Provider, MD   melatonin 10 MG CAPS capsule Take 10 mg by mouth nightly as needed    Yes Historical Provider, MD medical marijuana Take by mouth as needed. Ty Wadley  / Naeem Angry   Yes Historical Provider, MD   atorvastatin (LIPITOR) 40 MG tablet Take 40 mg by mouth daily   Yes Historical Provider, MD   Ferrous Sulfate (IRON PO) Take 1 tablet by mouth every 3 days    Yes Historical Provider, MD   ketotifen (ZADITOR) 0.025 % ophthalmic solution 1 drop 2 times daily   Yes Historical Provider, MD   BLACK CURRANT SEED OIL PO Take by mouth daily   Yes Historical Provider, MD   Multiple Vitamin (MVI, BARIATRIC ADVANTAGE MULTI-FORMULA, CHEW TAB) Take 1 tablet by mouth 2 times daily    Yes Historical Provider, MD   Calcium Citrate-Vitamin D (CA CITR+VIT D,CELEBRATE CALCIUM PLUS 500, TAB) 1 tablet 3 times daily. Yes Historical Provider, MD   Cholecalciferol (VITAMIN D PO) Take 5,000 Units by mouth every other day. Bariatric   Yes Historical Provider, MD   zinc 50 MG TABS tablet Take 50 mg by mouth daily  Patient not taking: Reported on 2022    Historical Provider, MD   omeprazole (PRILOSEC) 20 MG delayed release capsule Take 20 mg by mouth daily as needed   Patient not taking: Reported on 6/15/2022    Historical Provider, MD       Allergies   Allergen Reactions    Penicillins Hives and Rash    Sulfa Antibiotics Hives and Rash       Social History     Socioeconomic History    Marital status: Single     Spouse name: Not on file    Number of children: Not on file    Years of education: Not on file    Highest education level: Not on file   Occupational History    Not on file   Tobacco Use    Smoking status: Former     Packs/day: 1.50     Years: 4.00     Pack years: 6.00     Types: Cigarettes     Quit date: 1977     Years since quittin.1    Smokeless tobacco: Never   Substance and Sexual Activity    Alcohol use:  Yes     Alcohol/week: 0.8 standard drinks     Types: 1 Standard drinks or equivalent per week     Comment: Occasionally    Drug use: Yes     Types: Marijuana Shanon Eglin)     Comment: Medical Marijuana (nightly)    Sexual activity: Yes   Other Topics Concern    Not on file   Social History Narrative    Not on file     Social Determinants of Health     Financial Resource Strain: Not on file   Food Insecurity: Not on file   Transportation Needs: Not on file   Physical Activity: Not on file   Stress: Not on file   Social Connections: Not on file   Intimate Partner Violence: Not on file   Housing Stability: Not on file       Family History   Problem Relation Age of Onset    High Blood Pressure Mother     Diabetes Mother     Diabetes Father     High Blood Pressure Father     High Blood Pressure Brother     Diabetes Paternal Grandfather        REVIEW OF SYSTEMS:     Patient specifically denies fever/chills, chest pain, shortness of breath, new bowel or bladder complaints. All other review of systems was negative. Review of Systems - Documented reviewed    PHYSICAL EXAMINATION:      /72   Pulse 61   Temp 96.9 °F (36.1 °C) (Infrared)   Resp 16   Ht 5' 5\" (1.651 m)   Wt 168 lb (76.2 kg)   SpO2 99%   BMI 27.96 kg/m²     General:      General appearance:  Pleasant and well-hydrated, in no distress and A & O x 3  Build:Overweight  Function: Rises from seated position easily    HEENT:    Head:normocephalic, atraumatic  Pupils:regular, round, equal  Sclera: icterus absent    Lungs:    Breathing:normal breathing pattern     CVS:     RRR    Abdomen:    Shape:non-distended and normal    Cervical spine:    Inspection:normal  Palpation:tenderness paravertebral muscles, tenderness trapezium, left, right : no  Range of motion:Normal flexion, extension, rotation bilaterally and is not painful. Spurling's: negative bilaterally    Thoracic spine:     Spine inspection:normal   Palpation:No tenderness over the midline and paraspinal area, bilaterally  Range of motion:normal in flexion, extension rotation bilateral and is not painful.     Lumbar spine:    Spine inspection: Normal   Palpation: Tenderness paravertebral muscles No bilaterally  Range of motion: Decreased, flexion Decreased, Lateral bending, extension and rotation bilaterally reduced. Sacroiliac joint tenderness No bilaterally  JANET test: negative bilaterally  Gaenslen's test:negative bilaterally   Piriformis tenderness: negative bilaterally  SLR : negative bilaterally    Musculoskeletal:    Trigger points No     Extremities:    Tremors:None bilaterally upper and lower  Edema:no    Neurological:    Sensory: Normal to light touch     Motor:   Right  5/5              Left  5/5               Right Bicep 5/5           Left Bicep 5/5              Right Triceps 5/5       Left Triceps 5/5          Right Deltoid 5/5     Left Deltoid 5/5                  Right Quadriceps 5/5          Left Quadriceps 5/5           Right Gastrocnemius 5/5    Left Gastrocnemius 5/5  Right Ant Tibialis 5/5  Left Ant Tibialis 5/5    Dermatology:    Skin:no rashes or lesions noted    Assessment/Plan:     Diagnosis Orders   1. Spinal stenosis of lumbar region, unspecified whether neurogenic claudication present        2. DDD (degenerative disc disease), lumbar        3. Lumbar radicular pain        4. Lumbosacral spondylosis without myelopathy        5. S/P gastric bypass              74 y.o. female with h/o low back pain and right LE pain. Failed conservative treatment    MRI LS spine : multi level DDD/ stenosis/ foraminal narrowing. Has been evaluated by NSG- referred for interventions    S/P Gastric bypass    On Medical Marijuana. On gabapentin. PLAN:    Lumbar TFESI right L3 & L4 under fluoroscopy. RBA discussed - patient agreed. If technical difficulty/ no help, consider interlaminar JOSE. Physical therapy. If JOSE does not help, consider NSG re-eval.    Counseling : Patient encouraged to stay active and to continue Regular home exercise program as tolerated - stretching / strengthening.     Treatment plan discussed with the patient including medication and procedure side effects. Controlled Substances Monitoring:   OARRS reviewed- medical marijuana. Nena Lomeli MD    Dear Mr. Aissatou Driscoll,   Thank you for referring Ms. Arminda Zuñiga and allowing us to participate in her care. Please do not hesitate to contact me if you have any questions regarding her care. Cici Lopez MD    CC:    Sulema Azevedo, [address] [facility]   [facility],  [address] [facility]     Ele Keller, [address] [place] [id number]

## 2022-07-25 ENCOUNTER — HOSPITAL ENCOUNTER (OUTPATIENT)
Age: 67
Setting detail: OUTPATIENT SURGERY
Discharge: HOME | End: 2022-07-25
Attending: ANESTHESIOLOGY | Admitting: ANESTHESIOLOGY
Payer: MEDICARE

## 2022-07-25 ENCOUNTER — HOSPITAL ENCOUNTER (OUTPATIENT)
Dept: GENERAL RADIOLOGY | Age: 67
Setting detail: OUTPATIENT SURGERY
Discharge: HOME | End: 2022-07-27
Attending: ANESTHESIOLOGY
Payer: MEDICARE

## 2022-07-25 VITALS
TEMPERATURE: 97.7 F | SYSTOLIC BLOOD PRESSURE: 167 MMHG | BODY MASS INDEX: 28.32 KG/M2 | RESPIRATION RATE: 18 BRPM | WEIGHT: 170 LBS | HEIGHT: 65 IN | DIASTOLIC BLOOD PRESSURE: 79 MMHG | HEART RATE: 54 BPM | OXYGEN SATURATION: 98 %

## 2022-07-25 DIAGNOSIS — R52 PAIN MANAGEMENT: ICD-10-CM

## 2022-07-25 PROCEDURE — 6360000004 HC RX CONTRAST MEDICATION: Performed by: ANESTHESIOLOGY

## 2022-07-25 PROCEDURE — 3209999900 FLUORO FOR SURGICAL PROCEDURES

## 2022-07-25 PROCEDURE — 2709999900 HC NON-CHARGEABLE SUPPLY: Performed by: ANESTHESIOLOGY

## 2022-07-25 PROCEDURE — 7100000011 HC PHASE II RECOVERY - ADDTL 15 MIN: Performed by: ANESTHESIOLOGY

## 2022-07-25 PROCEDURE — 2500000003 HC RX 250 WO HCPCS: Performed by: ANESTHESIOLOGY

## 2022-07-25 PROCEDURE — 7100000010 HC PHASE II RECOVERY - FIRST 15 MIN: Performed by: ANESTHESIOLOGY

## 2022-07-25 PROCEDURE — 3600000002 HC SURGERY LEVEL 2 BASE: Performed by: ANESTHESIOLOGY

## 2022-07-25 PROCEDURE — 64483 NJX AA&/STRD TFRM EPI L/S 1: CPT | Performed by: ANESTHESIOLOGY

## 2022-07-25 PROCEDURE — 64484 NJX AA&/STRD TFRM EPI L/S EA: CPT | Performed by: ANESTHESIOLOGY

## 2022-07-25 PROCEDURE — 6360000002 HC RX W HCPCS: Performed by: ANESTHESIOLOGY

## 2022-07-25 RX ORDER — DEXAMETHASONE SODIUM PHOSPHATE 10 MG/ML
INJECTION, SOLUTION INTRAMUSCULAR; INTRAVENOUS PRN
Status: DISCONTINUED | OUTPATIENT
Start: 2022-07-25 | End: 2022-07-25 | Stop reason: ALTCHOICE

## 2022-07-25 RX ORDER — SODIUM CHLORIDE 0.9 % (FLUSH) 0.9 %
5-40 SYRINGE (ML) INJECTION PRN
Status: DISCONTINUED | OUTPATIENT
Start: 2022-07-25 | End: 2022-07-25 | Stop reason: HOSPADM

## 2022-07-25 RX ORDER — SODIUM CHLORIDE 0.9 % (FLUSH) 0.9 %
5-40 SYRINGE (ML) INJECTION EVERY 12 HOURS SCHEDULED
Status: DISCONTINUED | OUTPATIENT
Start: 2022-07-25 | End: 2022-07-25 | Stop reason: HOSPADM

## 2022-07-25 RX ORDER — LIDOCAINE HYDROCHLORIDE 5 MG/ML
INJECTION, SOLUTION INFILTRATION; INTRAVENOUS PRN
Status: DISCONTINUED | OUTPATIENT
Start: 2022-07-25 | End: 2022-07-25 | Stop reason: ALTCHOICE

## 2022-07-25 RX ORDER — SODIUM CHLORIDE 9 MG/ML
INJECTION, SOLUTION INTRAVENOUS PRN
Status: DISCONTINUED | OUTPATIENT
Start: 2022-07-25 | End: 2022-07-25 | Stop reason: HOSPADM

## 2022-07-25 ASSESSMENT — PAIN SCALES - GENERAL
PAINLEVEL_OUTOF10: 4
PAINLEVEL_OUTOF10: 0
PAINLEVEL_OUTOF10: 0
PAINLEVEL_OUTOF10: 10

## 2022-07-25 ASSESSMENT — PAIN DESCRIPTION - LOCATION: LOCATION: HIP;BUTTOCKS

## 2022-07-25 ASSESSMENT — PAIN DESCRIPTION - ONSET: ONSET: ON-GOING

## 2022-07-25 ASSESSMENT — PAIN DESCRIPTION - PAIN TYPE: TYPE: CHRONIC PAIN

## 2022-07-25 ASSESSMENT — PAIN DESCRIPTION - DESCRIPTORS: DESCRIPTORS: SHOOTING;SHARP;BURNING

## 2022-07-25 ASSESSMENT — PAIN DESCRIPTION - FREQUENCY: FREQUENCY: CONTINUOUS

## 2022-07-25 ASSESSMENT — PAIN DESCRIPTION - DIRECTION: RADIATING_TOWARDS: DOWN RIGHT LEG TO ANKLE

## 2022-07-25 ASSESSMENT — PAIN DESCRIPTION - ORIENTATION: ORIENTATION: RIGHT

## 2022-07-25 NOTE — DISCHARGE INSTRUCTIONS
Margo Miranda Core Block/Radiofrequency  Home Going Instructions    1-Go home, rest for the remainder of the day  2-Please do not lift over 20 pounds the day of the injection  3-If you received sedation No: alcohol, driving, operating lawn mowers, plows, tractors or other dangerous equipment until next morning. Do not make important decisions or sign legal documents for 24 hours. You may experience light headedness, dizziness, nausea or sleepiness after sedation. Do not stay alone. A responsible adult must be with you for 24 hours. You could be nauseated from the medications you have received. Your IV site may be sore and bruised. 4-No dietary restrictions     5-Resume all medications the same day, blood thinners to be resumed 24 hours after injection if you were instructed to stop any. 6-Keep the surgical site clean and dry, you may shower the next morning and remove the      dressing. 7- No sitz baths, tub baths or hot tubs/swimming for 24 hours. 8- If you have any pain at the injection site(s), application of an ice pack to the area should be       helpful, 20 minutes on/20 minutes off for next 48 hours. 9- Call [facility] immediately at if you develop.   Fever greater than 100.4 F  Have bleeding or drainage from the puncture site  Have progressive Leg/arm numbness and or weakness  Loss of control of bowel and or bladder (wet/soil yourself)  Severe headache with inability to lift head  10-You may return to work the next day

## 2022-07-25 NOTE — OP NOTE
Operative Note      Patient: Bre Crane  YOB: 1955  MRN: [id number]    Date of Procedure: 2022    Pre-Op Diagnosis: Lumbar radiculopathy [M54.16], spinal stenosis    Post-Op Diagnosis: Same       Procedure(s):  LUMBAR TRANSFORAMINAL EPIDURAL STEROID INJECTION RIGHT L3 AND L4 UNDER FLUOROSCOPIC GUIDANCE       Surgeon(s):  Johanna Mercedes MD    Assistant:   * No surgical staff found *    Anesthesia: Local    Estimated Blood Loss (mL): Minimal    Complications: None    Specimens:   * No specimens in log *    Implants:  * No implants in log *      Drains: * No LDAs found *    Findings: good needle placement    Detailed Description of Procedure:   2022    Patient: Bre Crane  :  1955  Age:  74 y.o. Sex:  female     PRE-OPERATIVE DIAGNOSIS: Lumbar disc displacement, lumbar neural foraminal stenosis, lumbar radiculopathy. POST-OPERATIVE DIAGNOSIS: Same. PROCEDURE: Right Transforaminal epidural steroid injection under fluoroscopic guidance at foraminal level L3 and L4.    SURGEON: Johanna Mercedes MD    ANESTHESIA: Local    ESTIMATED BLOOD LOSS: None.  ______________________________________________________________________  BRIEF HISTORY: Bre Crane comes in today for the first Right transforaminal epidural steroid injection under fluoroscopic guidance at foraminal level L3 and L4 . The potential complications of this procedure were discussed with her again today. She has elected to undergo the aforementioned procedure. Kerri complete History & Physical examination were reviewed in depth, a copy of which is in the chart. DESCRIPTION OF PROCEDURE:    After confirming written and informed consent, a time-out was performed and Kerri name and date of birth, the procedure to be performed as well as the plan for the location of the needle insertion were confirmed.     The patient was brought into the procedure room and placed in the prone position on the fluoroscopy table. Standard monitors were placed and vital signs were observed throughout the procedure. The area of the lumbar spine was prepped with chloraprep and draped in a sterile manner. The vertebral body was identified with AP fluoroscopy. An oblique view was obtained to better visualize the inferior junction of the pedicle and transverse process . The 6 o'clock position of the pedicle was marked and identified. The skin and subcutaneous tissue were anesthetized with 0.5% lidocaine. TWO # 22 gauge  3.5 inch pencil point needle was directed toward the targeted point under fluoroscopy until bone was contacted. The needle was then walked inferiorly until the neural foramen was entered . A lateral fluoroscopic view was then used to place the needle tip in the middle of the foramen. Negative aspiration was confirmed for blood and CSF and 1cc of Isovue M 200  contrast was injected at each level under live fluoroscopy. Appropriate neurograms were observed under AP fluoroscopy. Then after negative aspiration, a solution of the 2 cc of 0.5% lidocaine and 7 mg Dexamethasone was easily injected at each level. The needles were removed with constant aspiration technique. The patient back was cleaned and a bandage was placed over the needle insertion points    Disposition the patient tolerated the procedure well and there were no complications . Vital signs remained stable throughout the procedure. The patient was escorted to the recovery area where they remained until discharge and written discharge instructions for the procedure were given. Plan: [facility] will return to our pain management center as scheduled.      Moni Alvarado MD

## 2022-07-25 NOTE — INTERVAL H&P NOTE
Update History & Physical    The patient's History and Physical of July 20, 2022 was reviewed with the patient and I examined the patient. There was no change. The surgical site was confirmed by the patient and me. Plan:   Lumbar TFESI # 1. The risks, benefits, expected outcome, and alternative to the recommended procedure have been discussed with the patient. Patient understands and wants to proceed with the procedure.      Electronically signed by Nikki Arredondo MD on 7/25/2022

## 2022-08-15 ENCOUNTER — HOSPITAL ENCOUNTER (OUTPATIENT)
Dept: PHYSICAL THERAPY | Age: 67
Setting detail: THERAPIES SERIES
Discharge: HOME | End: 2022-08-15
Payer: MEDICARE

## 2022-08-15 ENCOUNTER — PREP FOR PROCEDURE (OUTPATIENT)
Dept: PAIN MANAGEMENT | Age: 67
End: 2022-08-15

## 2022-08-15 ENCOUNTER — OFFICE VISIT (OUTPATIENT)
Dept: PAIN MANAGEMENT | Age: 67
End: 2022-08-15
Payer: MEDICARE

## 2022-08-15 VITALS
TEMPERATURE: 98.2 F | SYSTOLIC BLOOD PRESSURE: 132 MMHG | HEIGHT: 65 IN | BODY MASS INDEX: 27.82 KG/M2 | WEIGHT: 167 LBS | DIASTOLIC BLOOD PRESSURE: 72 MMHG | RESPIRATION RATE: 16 BRPM | HEART RATE: 69 BPM | OXYGEN SATURATION: 96 %

## 2022-08-15 DIAGNOSIS — M53.3 SACROILIAC DYSFUNCTION: ICD-10-CM

## 2022-08-15 DIAGNOSIS — M51.36 DDD (DEGENERATIVE DISC DISEASE), LUMBAR: Primary | ICD-10-CM

## 2022-08-15 DIAGNOSIS — M47.817 LUMBOSACRAL SPONDYLOSIS WITHOUT MYELOPATHY: ICD-10-CM

## 2022-08-15 DIAGNOSIS — M48.061 SPINAL STENOSIS OF LUMBAR REGION, UNSPECIFIED WHETHER NEUROGENIC CLAUDICATION PRESENT: ICD-10-CM

## 2022-08-15 DIAGNOSIS — M53.3 SACROILIAC DYSFUNCTION: Primary | ICD-10-CM

## 2022-08-15 PROCEDURE — 3017F COLORECTAL CA SCREEN DOC REV: CPT | Performed by: ANESTHESIOLOGY

## 2022-08-15 PROCEDURE — 1090F PRES/ABSN URINE INCON ASSESS: CPT | Performed by: ANESTHESIOLOGY

## 2022-08-15 PROCEDURE — 99213 OFFICE O/P EST LOW 20 MIN: CPT | Performed by: ANESTHESIOLOGY

## 2022-08-15 PROCEDURE — G8399 PT W/DXA RESULTS DOCUMENT: HCPCS | Performed by: ANESTHESIOLOGY

## 2022-08-15 PROCEDURE — G8417 CALC BMI ABV UP PARAM F/U: HCPCS | Performed by: ANESTHESIOLOGY

## 2022-08-15 PROCEDURE — 1123F ACP DISCUSS/DSCN MKR DOCD: CPT | Performed by: ANESTHESIOLOGY

## 2022-08-15 PROCEDURE — G8427 DOCREV CUR MEDS BY ELIG CLIN: HCPCS | Performed by: ANESTHESIOLOGY

## 2022-08-15 PROCEDURE — 97161 PT EVAL LOW COMPLEX 20 MIN: CPT | Performed by: PHYSICAL THERAPIST

## 2022-08-15 PROCEDURE — 1036F TOBACCO NON-USER: CPT | Performed by: ANESTHESIOLOGY

## 2022-08-15 RX ORDER — SODIUM CHLORIDE 9 MG/ML
INJECTION, SOLUTION INTRAVENOUS PRN
Status: CANCELLED | OUTPATIENT
Start: 2022-08-15

## 2022-08-15 RX ORDER — SODIUM CHLORIDE 0.9 % (FLUSH) 0.9 %
5-40 SYRINGE (ML) INJECTION PRN
Status: CANCELLED | OUTPATIENT
Start: 2022-08-15

## 2022-08-15 RX ORDER — SODIUM CHLORIDE 0.9 % (FLUSH) 0.9 %
5-40 SYRINGE (ML) INJECTION EVERY 12 HOURS SCHEDULED
Status: CANCELLED | OUTPATIENT
Start: 2022-08-15

## 2022-08-15 NOTE — PROGRESS NOTES
Do you currently have any of the following:    Fever: No  Headache:  No  Cough: No  Shortness of breath: No  Exposed to anyone with these symptoms: No         Pro Ochoa presents to the [facility] on 8/15/2022. Berna Thomas is complaining of pain in her right hip. The pain is constant. The pain is described as aching, dull, sharp, and miserable. Pain is rated on her best day at a 7, on her worst day at a 10, and on average at a 8 on the VAS scale. She took her last dose of Neurontin today. Any procedures since your last visit: Yes, with 50 % relief for three days then back baseline. Pacemaker or defibrillator: No   She is not on NSAIDS and is not on anticoagulation medications. Medication Contract and Consent for Opioid Use Documents Filed        No documents found                    /72   Pulse 69   Temp 98.2 °F (36.8 °C) (Infrared)   Resp 16   Ht 5' 5\" (1.651 m)   Wt 167 lb (75.8 kg)   SpO2 96%   BMI 27.79 kg/m²      No LMP recorded. Patient has had a hysterectomy.

## 2022-08-15 NOTE — H&P (VIEW-ONLY)
[facility]        [address]  [place] [facility]  Dept: [phone]      Follow up Note      Harleen Purjazminkalli     Date of Visit:  8/15/2022    CC:  Patient presents for follow up   Chief Complaint   Patient presents with    Follow-up     LUMBAR TRANSFORAMINAL EPIDURAL STEROID INJECTION RIGHT L3 AND L4 UNDER FLUOROSCOPIC GUIDANCE       HPI:  Low back pain over the right lower lumbar area and intermittent right LE pain mainly over the right thigh. Pain causes functional limitations/ limits Adl's : Yes     Prior treatment at [facility]- in the past.     Has been evaluated by ortho recently to r/o hip pathology. Has been evaluated buy NSG - had MRI of LS spine and referred for interventions. Notes:  S/P Gastric bypass surgery. On Medical Marijuana. Previous treatments:  Physical Therapy /Chiropractic treatment/ HEP: yes,      Medications: - NSAID's : yes - caution due to h/o gastric bypass                       - Membrane stabilizers : yes - gabapentin                       - Opioids : no                       - Adjuvants or Others : yes,     Spine Surgeries: no     Anticoagulation medications: no.     H/O Smoking: no  H/O alcohol abuse : no  H/O Illicit drug use : denies. Currently Uses medical marijuana     Employment: retired     Imaging:   MRI of [address]: 7/11/2022:  Impression   1. No fracture or bony destructive lesion. 2. Severe central canal stenosis at L3-4. Moderate stenoses at L1-2 and   L2-3. Mild stenoses at L4-5 and L5-S1.   3.  Multilevel neural foraminal stenoses, worst (severe) at the left L3-4,   right L4-5 and bilateral L5-S1 levels. 4. Multilevel stenoses of the lateral recess, worse (severe) at the right   L5-S1 level, resulting in significant impingement of the right S1 nerve. OARRS report[de-identified] reviewed.     Past Medical History:   Diagnosis Date    Arthritis     Chronic back pain     Chronic hip pain     Constipation     Diabetes mellitus ([address]     no longer on meds since wt loss surgery    H/O chest x-ray     Hyperlipidemia     Hypertension     no meds since weight loss surgery    Nausea & vomiting     PONV (postoperative nausea and vomiting)     Ringing in ears     Spinal stenosis     Tension headache        Past Surgical History:   Procedure Laterality Date    CARPAL TUNNEL RELEASE Bilateral 2007    Dr. Dewayne Lancaster  Aug 2014    Laparoscopic    COLONOSCOPY  2007    HYSTERECTOMY (CERVIX STATUS UNKNOWN)  [place],Dr. Ran Rodriguez Side    JOINT REPLACEMENT  2007    lt knee     PAIN MANAGEMENT PROCEDURE Right 7/25/2022    LUMBAR TRANSFORAMINAL EPIDURAL STEROID INJECTION RIGHT L3 AND L4 UNDER FLUOROSCOPIC GUIDANCE performed by Alisha Pringle MD at [address]  12/08/2014    Laparoscopic    [address]  left,2007, Dr. Lanie Joshua Right 02/14/2011    Right TKA JOSEPH Mcgarry MD    UPPER GASTROINTESTINAL ENDOSCOPY  7/9/2014    Dr. Omkar Wood, [facility], Findings: Mild Gastritis       Prior to Admission medications    Medication Sig Start Date End Date Taking? Authorizing Provider   vitamin B-12 (CYANOCOBALAMIN) 500 MCG tablet Take 500 mcg by mouth in the morning. Yes Historical Provider, MD   Famotidine-Ca Carb-Mag Hydrox (PEPCID COMPLETE PO) Take 1 tablet by mouth daily as needed    Yes Historical Provider, MD   vitamin E 400 UNIT capsule Take 400 Units by mouth every other day    Yes Historical Provider, MD   melatonin 10 MG CAPS capsule Take 10 mg by mouth nightly as needed    Yes Historical Provider, MD   medical marijuana Take by mouth as needed.  Ty Gardiner  / [employer]   Yes Historical Provider, MD   atorvastatin (LIPITOR) 40 MG tablet Take 40 mg by mouth daily   Yes Historical Provider, MD   Ferrous Sulfate (IRON PO) Take 1 tablet by mouth every 3 days    Yes Historical Provider, MD   ketotifen (ZADITOR) 0.025 % ophthalmic solution 1 drop 2 times daily   Yes Historical Provider, MD   BLACK CURRANT SEED OIL PO Take by mouth daily   Yes Historical Provider, MD   Multiple Vitamin (MVI, BARIATRIC ADVANTAGE MULTI-FORMULA, CHEW TAB) Take 1 tablet by mouth 2 times daily    Yes Historical Provider, MD   Calcium Citrate-Vitamin D (CA CITR+VIT D,CELEBRATE CALCIUM PLUS 500, TAB) 1 tablet 3 times daily. Yes Historical Provider, MD   Cholecalciferol (VITAMIN D PO) Take 5,000 Units by mouth every other day. Bariatric   Yes Historical Provider, MD   gabapentin (NEURONTIN) 300 MG capsule Take 1 capsule by mouth 3 times daily for 30 days. 22  Hakan Shires, PA       Allergies   Allergen Reactions    Penicillins Hives and Rash    Sulfa Antibiotics Hives and Rash       Social History     Socioeconomic History    Marital status: Single     Spouse name: Not on file    Number of children: Not on file    Years of education: Not on file    Highest education level: Not on file   Occupational History    Not on file   Tobacco Use    Smoking status: Former     Packs/day: 1.50     Years: 4.00     Pack years: 6.00     Types: Cigarettes     Quit date: 1977     Years since quittin.2    Smokeless tobacco: Never   Vaping Use    Vaping Use: Never used   Substance and Sexual Activity    Alcohol use:  Yes     Alcohol/week: 0.8 standard drinks     Types: 1 Standard drinks or equivalent per week     Comment: Occasionally    Drug use: Yes     Types: Marijuana Lucianne Bars)     Comment: Medical Marijuana (nightly)    Sexual activity: Not Currently   Other Topics Concern    Not on file   Social History Narrative    Not on file     Social Determinants of Health     Financial Resource Strain: Not on file   Food Insecurity: Not on file   Transportation Needs: Not on file   Physical Activity: Not on file   Stress: Not on file   Social Connections: Not on file   Intimate Partner Violence: Not on file   Housing Stability: Not on file       Family History   Problem Relation Age of Onset    High Blood Pressure Mother     Diabetes Mother     Diabetes Father     High Blood Pressure Father     High Blood Pressure Brother     Diabetes Paternal Grandfather        REVIEW OF SYSTEMS:     Ruben Suazo denies fever/chills, chest pain, shortness of breath, new bowel or bladder complaints. All other review of systems was negative. PHYSICAL EXAMINATION:      /72   Pulse 69   Temp 98.2 °F (36.8 °C) (Infrared)   Resp 16   Ht 5' 5\" (1.651 m)   Wt 167 lb (75.8 kg)   SpO2 96%   BMI 27.79 kg/m²   General:       General appearance:  Pleasant and well-hydrated, in no distress and A & O x 3  Build:Overweight  Function: Rises from seated position easily     HEENT:     Head:normocephalic, atraumatic  Pupils:regular, round, equal  Sclera: icterus absent     Lungs:     Breathing:normal breathing pattern     CVS:     RRR     Abdomen:     Shape:non-distended and normal     Cervical spine:     Inspection:normal  Palpation:tenderness paravertebral muscles, tenderness trapezium, left, right : no  Range of motion:Normal flexion, extension, rotation bilaterally and is not painful. Spurling's: negative bilaterally     Thoracic spine:                Spine inspection:normal  Palpation:No tenderness over the midline and paraspinal area, bilaterally  Range of motion:normal in flexion, extension rotation bilateral and is not painful. Lumbar spine:     Spine inspection: Normal  Palpation: Tenderness paravertebral muscles No bilaterally  Range of motion: Decreased, flexion Decreased, Lateral bending, extension and rotation bilaterally reduced.   Sacroiliac joint tenderness + right  JANET test:+ right   Gaenslen's test:+ right  Piriformis tenderness: negative bilaterally  SLR : negative bilaterally     Musculoskeletal:     Trigger points No      Extremities:     Tremors:None bilaterally upper and lower  Edema:no     Neurological:     Sensory: Normal to light touch     Motor:  Right  5/5 Left  5/5               Right Bicep 5/5           Left Bicep 5/5              Right Triceps 5/5       Left Triceps 5/5          Right Deltoid 5/5     Left Deltoid 5/5                  Right Quadriceps 5/5          Left Quadriceps 5/5           Right Gastrocnemius 5/5    Left Gastrocnemius 5/5  Right Ant Tibialis 5/5  Left Ant Tibialis 5/5     Dermatology:     Skin:no rashes or lesions noted     Assessment/Plan:       Diagnosis Orders   1. Spinal stenosis of lumbar region, unspecified whether neurogenic claudication present         2. DDD (degenerative disc disease), lumbar         3. Lumbar radicular pain         4. Lumbosacral spondylosis without myelopathy         5. S/P gastric bypass              74 y.o. female with h/o low back pain and right LE pain. Failed conservative treatment     MRI LS spine : multi level DDD/ stenosis/ foraminal narrowing. Has been evaluated by NSG- referred for interventions     S/P Gastric bypass     On Medical Marijuana. On gabapentin. S/P Lumbar TFESI right L3 & L4 good relief of LE pain. Continues to have significant pain over the right SIJ. Features of Right SIJ pain. Doing PT/ HEP. PLAN:     Right SIJ injection under fluoroscopy. RBA discussed - patient agreed. Consider [place] JOSE in future. Physical therapy. If interventions does not help, consider NSG re-eval.     Counseling : Patient encouraged to stay active and to continue Regular home exercise program as tolerated - stretching / strengthening. Treatment plan discussed with the patient including medication and procedure side effects. Controlled Substances Monitoring:   OARRS reviewed- medical marijuana.       Cici Sandoval MD

## 2022-08-15 NOTE — PROGRESS NOTES
[facility]        [address]  [place], [address] [facility]  Dept: [phone]      Follow up Note      Chelseayvettekalli Brayan     Date of Visit:  8/15/2022    CC:  Patient presents for follow up   Chief Complaint   Patient presents with    Follow-up     LUMBAR TRANSFORAMINAL EPIDURAL STEROID INJECTION RIGHT L3 AND L4 UNDER FLUOROSCOPIC GUIDANCE       HPI:  Low back pain over the right lower lumbar area and intermittent right LE pain mainly over the right thigh. Pain causes functional limitations/ limits Adl's : Yes     Prior treatment at [facility]- in the past.     Has been evaluated by ortho recently to r/o hip pathology. Has been evaluated buy NS - had MRI of LS spine and referred for interventions. Notes:  S/P Gastric bypass surgery. On Medical Marijuana. Previous treatments:  Physical Therapy /Chiropractic treatment/ HEP: yes,      Medications: - NSAID's : yes - caution due to h/o gastric bypass                       - Membrane stabilizers : yes - gabapentin                       - Opioids : no                       - Adjuvants or Others : yes,     Spine Surgeries: no     Anticoagulation medications: no.     H/O Smoking: no  H/O alcohol abuse : no  H/O Illicit drug use : denies. Currently Uses medical marijuana     Employment: retired     Imaging:   MRI of [address]: 7/11/2022:  Impression   1. No fracture or bony destructive lesion. 2. Severe central canal stenosis at L3-4. Moderate stenoses at L1-2 and   L2-3. Mild stenoses at L4-5 and L5-S1.   3.  Multilevel neural foraminal stenoses, worst (severe) at the left L3-4,   right L4-5 and bilateral L5-S1 levels. 4. Multilevel stenoses of the lateral recess, worse (severe) at the right   L5-S1 level, resulting in significant impingement of the right S1 nerve. OARRS report[de-identified] reviewed.     Past Medical History:   Diagnosis Date    Arthritis     Chronic back pain     Chronic hip pain     Constipation     Diabetes mellitus ([address]     no longer on meds since wt loss surgery    H/O chest x-ray     Hyperlipidemia     Hypertension     no meds since weight loss surgery    Nausea & vomiting     PONV (postoperative nausea and vomiting)     Ringing in ears     Spinal stenosis     Tension headache        Past Surgical History:   Procedure Laterality Date    CARPAL TUNNEL RELEASE Bilateral 2007    Dr. Chapin Hunter  Aug 2014    Laparoscopic    COLONOSCOPY  2007    HYSTERECTOMY (CERVIX STATUS UNKNOWN)  [place],Dr. Ran Rodriguez Side    JOINT REPLACEMENT  2007    lt knee     PAIN MANAGEMENT PROCEDURE Right 7/25/2022    LUMBAR TRANSFORAMINAL EPIDURAL STEROID INJECTION RIGHT L3 AND L4 UNDER FLUOROSCOPIC GUIDANCE performed by Kim Moulton MD at [address] [facility]  12/08/2014    Laparoscopic    [address]  left,2007, Dr. Andra Salazar Right 02/14/2011    Right TKA JOSEPH Rendon MD    UPPER GASTROINTESTINAL ENDOSCOPY  7/9/2014    Dr. Diaz Logan, [facility], Findings: Mild Gastritis       Prior to Admission medications    Medication Sig Start Date End Date Taking? Authorizing Provider   vitamin B-12 (CYANOCOBALAMIN) 500 MCG tablet Take 500 mcg by mouth in the morning. Yes Historical Provider, MD   Famotidine-Ca Carb-Mag Hydrox (PEPCID COMPLETE PO) Take 1 tablet by mouth daily as needed    Yes Historical Provider, MD   vitamin E 400 UNIT capsule Take 400 Units by mouth every other day    Yes Historical Provider, MD   melatonin 10 MG CAPS capsule Take 10 mg by mouth nightly as needed    Yes Historical Provider, MD   medical marijuana Take by mouth as needed.  Burnett Laundry  / [place] [employer]   Yes Historical Provider, MD   atorvastatin (LIPITOR) 40 MG tablet Take 40 mg by mouth daily   Yes Historical Provider, MD   Ferrous Sulfate (IRON PO) Take 1 tablet by mouth every 3 days    Yes Historical Provider, MD   ketotifen (ZADITOR) 0.025 % ophthalmic solution 1 drop 2 times daily   Yes Historical Provider, MD   BLACK CURRANT SEED OIL PO Take by mouth daily   Yes Historical Provider, MD   Multiple Vitamin (MVI, BARIATRIC ADVANTAGE MULTI-FORMULA, CHEW TAB) Take 1 tablet by mouth 2 times daily    Yes Historical Provider, MD   Calcium Citrate-Vitamin D (CA CITR+VIT D,CELEBRATE CALCIUM PLUS 500, TAB) 1 tablet 3 times daily. Yes Historical Provider, MD   Cholecalciferol (VITAMIN D PO) Take 5,000 Units by mouth every other day. Bariatric   Yes Historical Provider, MD   gabapentin (NEURONTIN) 300 MG capsule Take 1 capsule by mouth 3 times daily for 30 days. 22  ROSS Gaytan       Allergies   Allergen Reactions    Penicillins Hives and Rash    Sulfa Antibiotics Hives and Rash       Social History     Socioeconomic History    Marital status: Single     Spouse name: Not on file    Number of children: Not on file    Years of education: Not on file    Highest education level: Not on file   Occupational History    Not on file   Tobacco Use    Smoking status: Former     Packs/day: 1.50     Years: 4.00     Pack years: 6.00     Types: Cigarettes     Quit date: 1977     Years since quittin.2    Smokeless tobacco: Never   Vaping Use    Vaping Use: Never used   Substance and Sexual Activity    Alcohol use:  Yes     Alcohol/week: 0.8 standard drinks     Types: 1 Standard drinks or equivalent per week     Comment: Occasionally    Drug use: Yes     Types: Marijuana Linden Ramos)     Comment: Medical Marijuana (nightly)    Sexual activity: Not Currently   Other Topics Concern    Not on file   Social History Narrative    Not on file     Social Determinants of Health     Financial Resource Strain: Not on file   Food Insecurity: Not on file   Transportation Needs: Not on file   Physical Activity: Not on file   Stress: Not on file   Social Connections: Not on file   Intimate Partner Violence: Not on file   Housing Stability: Not on file       Family History   Problem Relation Age of Onset    High Blood Pressure Mother     Diabetes Mother     Diabetes Father     High Blood Pressure Father     High Blood Pressure Brother     Diabetes Paternal Grandfather        REVIEW OF SYSTEMS:     Lori Milling denies fever/chills, chest pain, shortness of breath, new bowel or bladder complaints. All other review of systems was negative. PHYSICAL EXAMINATION:      /72   Pulse 69   Temp 98.2 °F (36.8 °C) (Infrared)   Resp 16   Ht 5' 5\" (1.651 m)   Wt 167 lb (75.8 kg)   SpO2 96%   BMI 27.79 kg/m²   General:       General appearance:  Pleasant and well-hydrated, in no distress and A & O x 3  Build:Overweight  Function: Rises from seated position easily     HEENT:     Head:normocephalic, atraumatic  Pupils:regular, round, equal  Sclera: icterus absent     Lungs:     Breathing:normal breathing pattern     CVS:     RRR     Abdomen:     Shape:non-distended and normal     Cervical spine:     Inspection:normal  Palpation:tenderness paravertebral muscles, tenderness trapezium, left, right : no  Range of motion:Normal flexion, extension, rotation bilaterally and is not painful. Spurling's: negative bilaterally     Thoracic spine:                Spine inspection:normal  Palpation:No tenderness over the midline and paraspinal area, bilaterally  Range of motion:normal in flexion, extension rotation bilateral and is not painful. Lumbar spine:     Spine inspection: Normal  Palpation: Tenderness paravertebral muscles No bilaterally  Range of motion: Decreased, flexion Decreased, Lateral bending, extension and rotation bilaterally reduced.   Sacroiliac joint tenderness + right  JANET test:+ right   Gaenslen's test:+ right  Piriformis tenderness: negative bilaterally  SLR : negative bilaterally     Musculoskeletal:     Trigger points No      Extremities:     Tremors:None bilaterally upper and lower  Edema:no     Neurological:     Sensory: Normal to light touch     Motor:  Right  5/5 Left  5/5               Right Bicep 5/5           Left Bicep 5/5              Right Triceps 5/5       Left Triceps 5/5          Right Deltoid 5/5     Left Deltoid 5/5                  Right Quadriceps 5/5          Left Quadriceps 5/5           Right Gastrocnemius 5/5    Left Gastrocnemius 5/5  Right Ant Tibialis 5/5  Left Ant Tibialis 5/5     Dermatology:     Skin:no rashes or lesions noted     Assessment/Plan:       Diagnosis Orders   1. Spinal stenosis of lumbar region, unspecified whether neurogenic claudication present         2. DDD (degenerative disc disease), lumbar         3. Lumbar radicular pain         4. Lumbosacral spondylosis without myelopathy         5. S/P gastric bypass              74 y.o. female with h/o low back pain and right LE pain. Failed conservative treatment     MRI LS spine : multi level DDD/ stenosis/ foraminal narrowing. Has been evaluated by NSG- referred for interventions     S/P Gastric bypass     On Medical Marijuana. On gabapentin. S/P Lumbar TFESI right L3 & L4 good relief of LE pain. Continues to have significant pain over the right SIJ. Features of Right SIJ pain. Doing PT/ HEP. PLAN:     Right SIJ injection under fluoroscopy. RBA discussed - patient agreed. Consider [place] JOSE in future. Physical therapy. If interventions does not help, consider NSG re-eval.     Counseling : Patient encouraged to stay active and to continue Regular home exercise program as tolerated - stretching / strengthening. Treatment plan discussed with the patient including medication and procedure side effects. Controlled Substances Monitoring:   OARRS reviewed- medical marijuana.       Brittany Gerber MD

## 2022-08-15 NOTE — PROGRESS NOTES
Physical Therapy    Physical Therapy: Initial Evaluation    Patient: Adwoa Greene (3 y.o. female)   Examination Date:   Plan of Care Certification Period: 8/15/2022 to 11/15/22      :  1955 ;    Confirmed: Yes MRN: [id number]  CSN: [id number]   Insurance: Payor: MEDICARE / Plan: MEDICARE PART A AND B / Product Type: *No Product type* /   Insurance ID: [id number] - (Medicare) Secondary Insurance (if applicable):  [facility] [employer]   Referring Physician: Nicolette Reece MD     PCP: Ele Keller MD Visits to Date/Visits Approved: 1 /      No Show/Cancelled Appts:   /       Medical Diagnosis: Spinal stenosis, lumbar region without neurogenic claudication [M48.061]  Other intervertebral disc degeneration, lumbar region [M51.36]  Radiculopathy, lumbar region [M54.16]  Spondylosis without myelopathy or radiculopathy, lumbosacral region [M47.817] M48.061 (ICD-10-CM) - Spinal stenosis of lumbar region, unspecified whether neurogenic claudication present  M51.36 (ICD-10-CM) - DDD (degenerative disc disease), lumbar  M54.16 (ICD-10-CM) - Lumbar radicular pain  M47.817 (ICD-10-CM) - Lumbosacral spondylosis without myelopathy  Treatment Diagnosis:       PERTINENT MEDICAL HISTORY           Medical History: Chart Reviewed: Yes   Past Medical History:   Diagnosis Date    Arthritis     Chronic back pain     Chronic hip pain     Constipation     Diabetes mellitus ([address]     no longer on meds since wt loss surgery    H/O chest x-ray     Hyperlipidemia     Hypertension     no meds since weight loss surgery    Nausea & vomiting     PONV (postoperative nausea and vomiting)     Ringing in ears     Spinal stenosis     Tension headache      Surgical History:   Past Surgical History:   Procedure Laterality Date    CARPAL TUNNEL RELEASE Bilateral 2007    Dr. Marvin Leggett  Aug 2014    Laparoscopic    COLONOSCOPY  2007    HYSTERECTOMY (CERVIX STATUS UNKNOWN)  , [place]    JOINT REPLACEMENT  2007    lt knee     PAIN MANAGEMENT PROCEDURE Right 7/25/2022    LUMBAR TRANSFORAMINAL EPIDURAL STEROID INJECTION RIGHT L3 AND L4 UNDER FLUOROSCOPIC GUIDANCE performed by Nena Lomeli MD at [facility]  12/08/2014    Laparoscopic    TONSILLECTOMY  1962    TOTAL KNEE ARTHROPLASTY  left,2007, Dr. Dedra Fragoso Right 02/14/2011    Right TKA JOSEPH Rosales MD    UPPER GASTROINTESTINAL ENDOSCOPY  7/9/2014    Dr. Vasu Martino, [facility], Findings: Mild Gastritis       Medications:   Current Outpatient Medications:     vitamin B-12 (CYANOCOBALAMIN) 500 MCG tablet, Take 500 mcg by mouth in the morning., Disp: , Rfl:     gabapentin (NEURONTIN) 300 MG capsule, Take 1 capsule by mouth 3 times daily for 30 days. , Disp: 90 capsule, Rfl: 3    Famotidine-Ca Carb-Mag Hydrox (PEPCID COMPLETE PO), Take 1 tablet by mouth daily as needed , Disp: , Rfl:     vitamin E 400 UNIT capsule, Take 400 Units by mouth every other day , Disp: , Rfl:     melatonin 10 MG CAPS capsule, Take 10 mg by mouth nightly as needed , Disp: , Rfl:     medical marijuana, Take by mouth as needed. Gummies  / Honey, Disp: , Rfl:     atorvastatin (LIPITOR) 40 MG tablet, Take 40 mg by mouth daily, Disp: , Rfl:     Ferrous Sulfate (IRON PO), Take 1 tablet by mouth every 3 days , Disp: , Rfl:     ketotifen (ZADITOR) 0.025 % ophthalmic solution, 1 drop 2 times daily, Disp: , Rfl:     BLACK CURRANT SEED OIL PO, Take by mouth daily, Disp: , Rfl:     Multiple Vitamin (MVI, BARIATRIC ADVANTAGE MULTI-FORMULA, CHEW TAB), Take 1 tablet by mouth 2 times daily , Disp: , Rfl:     Calcium Citrate-Vitamin D (CA CITR+VIT D,CELEBRATE CALCIUM PLUS 500, TAB), 1 tablet 3 times daily. , Disp: , Rfl:     Cholecalciferol (VITAMIN D PO), Take 5,000 Units by mouth every other day.  Bariatric, Disp: , Rfl:   Allergies: Penicillins and Sulfa antibiotics      SUBJECTIVE EXAMINATION      , Subjective History:    Subjective: Pt came in to PT today for experiencing lower back pain. She had a steroid shot in July that did not help and is currently taking gabapentin which wears off after a few hours. She has been having back pain since 2007. She reports not being able to walk 10 minutes without symptom reproduction. There was no known mechanism of injury. Nothing helps to alleviate symptoms. She has not been to PT before for her back. She goes to a chiropractor which alleviates her symptoms for about a day. Pt reported having radiating pain towards R hip when she got back injections but nothing radiating after that. No reports of numbness of tingling. Pt reports pain in R hip/back when walking. Additional Pertinent Hx (if applicable):     Prior diagnostic testing[de-identified] MRI      Learning/Language: Learning  Does the patient/guardian have any barriers to learning?: No barriers  What is the preferred language of the patient/guardian?: English     Pain Screening    Pain Screening  Patient Currently in Pain: No    Functional Status    Dominant Hand: : Right    Social History:    Social History  Lives With: Family  Type of Home: [facility]  Home Layout: One level      OBJECTIVE EXAMINATION   Restrictions:         WB Status: full WB    Review of Systems:  Follows Commands: Within Functional Limits    Palpation:   No symptom reproduction with palpation    Ambulation/Gait (if applicable):   Ambulation  WB Status: full WB  Ambulation  Surface: level tile  Device: No Device  Assistance: Independent  Quality of Gait: Pt ambulated with normal heel toe mechanics with short step length.     Balance Screen:   Balance  Posture: Fair  Sitting - Static: Good  Sitting - Dynamic: Good  Standing - Static: Good  Standing - Dynamic: Good    Neuro Screen:   Not Assessed    Left Strength  Right Strength         Strength LLE  L Hip Flexion: 4/5  L Hip ABduction: 4/5  L Hip ADduction: 4/5  L Knee Flexion: 4/5  L Knee Extension: 4+/5  L Ankle Dorsiflexion: 4+/5    Strength RLE  R Hip Flexion: 4-/5  R Hip ABduction: 4/5  R Hip ADduction: 4/5  R Knee Flexion: 4/5  R Knee Extension: 4+/5  R Ankle Dorsiflexion: 4+/5       Lumbar Assessment     AROM Lumbar Spine   Lumbar Spine AROM : WNL       Trunk Strength     Trunk Strength  Trunk Flexion: 3+/5  Trunk Extension: 4/5     Balance/Gait Assessment(s) Performed:   Not Assessed    ASSESSMENT     Impression: Assessment: Pt went through full evaluation without symptom reproduction. Pt experienced decreased strength of trunk, and LE. Pt has slight rounded shoulders, and trunk flexion during sitting. Pt ambulated with normal heel toe mechanics and short step length. Pt reports that she experiences increased pain in back and R hip after ambulating for more than 10 minutes. Body Structures, Functions, Activity Limitations Requiring Skilled Therapeutic Intervention: Decreased strength, Decreased posture, Increased pain    Statement of Medical Necessity: Physical Therapy is both indicated and medically necessary as outlined in the POC to increase the likelihood of meeting the functionally related goals stated below. Patient's Activity Tolerance:        Patient's rehabilitation potential/prognosis is considered to be: Fair    Factors which may impact rehabilitation potential include:          GOALS   Patient Goal(s):    Short Term Goals Completed by 3 weeks Goal Status   Pt will increase trunk flexion strength by 1/2 MMT grade to increase posture to [employer]. New   Pt will report being able to ambulate 15 minutes without symptom reproduction. New   Pt will increase LE strength by 1/2 MMT grade New                   Long Term Goals Completed by 6 weeks Goal Status   Pt will increase trunk flexion strength by 1 MMT grade to increase posture to GOOD. New   Pt will report being able to ambulate 30+ minutes without symptom reproduction.  New   Pt will increase LE strength by 1 MMT grade New   Pt willl demonstrate independence with HEP to decrease symptom reproduction after PT. New              TREATMENT PLAN       Pt. actively involved in establishing Plan of Care and Goals: Yes  Patient/ Caregiver education and instruction: Plan of Care, Posture PT was educated on the lumbar roll. She was also educated about stenosis and why she experinces pain after standing up straight for a while. Treatment may include any combination of the following: Strengthening, Home exercise program, Patient/Caregiver education & training, Manual Therapy - Soft Tissue Mobilization, Modalities, Therapeutic activities     Frequency / Duration:  Patient to be seen 2 days for 6 weeks      Eval Complexity:    Decision Making: Low Complexity    PT Treatment Completed:  N/A - Evaluation Only    Therapy Time  Individual Time In: 1000       Individual Time Out: 1030  Minutes: [address], SPT  Therapist Signature: Merced Cornejo PT    Date: 6/81/5121     I certify that the above Therapy Services are being furnished while the patient is under my care. I agree with the treatment plan and certify that this therapy is necessary. Physician's Signature:  ___________________________   Date:_______                                                                   Salinas Cline MD        Physician Comments: _______________________________________________    Please sign and return to [facility] PHYSICAL THERAP[facility]. Please fax to the location listed below.  Juana Becerra for this referral!    [address] [facility] PHYSICAL THERAPY  [facility] [address]  Dept: [phone]  Fax: [phone]       POC NOTE

## 2022-08-16 ENCOUNTER — TELEPHONE (OUTPATIENT)
Dept: PAIN MANAGEMENT | Age: 67
End: 2022-08-16

## 2022-08-16 NOTE — TELEPHONE ENCOUNTER
Call to Bre Crane that procedure was approved for 8/22/2022 and that the surgery center should call her a few days before for the pre op call and after 3:00 PM the business day before with the arrival time. Instructed to call office back if any questions. Arminda verbalized understanding.      Jayda Patel RN  Pain Management

## 2022-08-18 ENCOUNTER — HOSPITAL ENCOUNTER (OUTPATIENT)
Dept: PHYSICAL THERAPY | Age: 67
Setting detail: THERAPIES SERIES
Discharge: HOME | End: 2022-08-18
Payer: MEDICARE

## 2022-08-18 PROCEDURE — G0283 ELEC STIM OTHER THAN WOUND: HCPCS

## 2022-08-18 PROCEDURE — 97110 THERAPEUTIC EXERCISES: CPT

## 2022-08-18 NOTE — PROGRESS NOTES
[facility]NAG[facility]  INSTRUCTIONS  . .......................................................................................................................................... [x] Parking the day of Surgery is located in the [facility].   Upon entering the door, make immediate right into the surgery reception room    [x]  Bring photo ID and insurance card     [x] You may have a light breakfast day of procedure    [x]  Wear loose comfortable clothing    [x]  Please follow instructions for medications as given per Dr's office    [x] You can expect a call the business day prior to procedure to notify you of your arrival time     [x] Please arrange for     []  Other instructions

## 2022-08-18 NOTE — PROGRESS NOTES
[facility]  Phone: [phone] Fax: [phone]       Physical Therapy Daily Treatment Note  Date:  2022    Patient Name:  Stefani Shepard    :  1955  MRN: [id number]    Restrictions/Precautions:    Diagnosis:  DDD Lumbar, Lumbar Radiculopathy, Lumbosacral Spondylosis without Myelopathy  Treatment Diagnosis:    Insurance/Certification information: Medicare  Referring Physician: Dr. Hector Mahoney of care signed (Y/N):    Visit# / total visits:  2/6 weeks  Pain level: /10  Time In: 7:05       Time Out: 7:50         Subjective:  Pt reports same pain in right SI region today. Rates pain at 2-3/10. States she is having another injection Monday. Exercises:  Exercise/Equipment Resistance/Repetitions Other comments   Bike  5 min    Seated lumbar flex/rotation 10 x each    Hamstring stretch (R) 10\" 5 x     SKTC  10\" 5 x B    Piriformis stretch  (R) 10\" 5 x     Lateral hip stretch (R) 10\" 5 x                                                                                                    Other Therapeutic Activities:      Home Exercise Program:      Manual Treatments:      Modalities:  IFC and MHP to right SI area x 20 min for pain control. Pt seated    Timed Code Treatment Minutes:  45    Total Treatment Minutes:  45    Treatment/Activity Tolerance:  [x] Patient tolerated treatment well [] Patient limited by fatigue  [] Patient limited by pain  [] Patient limited by other medical complications  [x] Other: Pt tolerated initiation of exercises without c/o pain.      Plan:   [x] Continue per plan of care [] Alter current plan (see comments)  [] Plan of care initiated [] Hold pending MD visit [] Discharge  Plan for Next Session:         Treatment Charges: Mins Units   Initial Evaluation     Re-Evaluation     Ther Exercise         TE 20 1   Manual Therapy     MT     Ther Activities        TA     Gait Training          GT     Neuro Re-education NR     Modalities 20 1 Non-Billable Service Time     Other 5    Total Time/Units 45 2     Electronically signed by:   Chalino Vincent

## 2022-08-22 ENCOUNTER — HOSPITAL ENCOUNTER (OUTPATIENT)
Age: 67
Setting detail: OUTPATIENT SURGERY
Discharge: HOME | End: 2022-08-22
Attending: ANESTHESIOLOGY | Admitting: ANESTHESIOLOGY
Payer: MEDICARE

## 2022-08-22 ENCOUNTER — HOSPITAL ENCOUNTER (OUTPATIENT)
Dept: GENERAL RADIOLOGY | Age: 67
Discharge: HOME | End: 2022-08-24
Attending: ANESTHESIOLOGY
Payer: MEDICARE

## 2022-08-22 VITALS
HEIGHT: 65 IN | WEIGHT: 167 LBS | DIASTOLIC BLOOD PRESSURE: 74 MMHG | OXYGEN SATURATION: 98 % | HEART RATE: 62 BPM | SYSTOLIC BLOOD PRESSURE: 158 MMHG | BODY MASS INDEX: 27.82 KG/M2 | RESPIRATION RATE: 18 BRPM | TEMPERATURE: 97 F

## 2022-08-22 DIAGNOSIS — R52 PAIN MANAGEMENT: ICD-10-CM

## 2022-08-22 PROCEDURE — 2500000003 HC RX 250 WO HCPCS: Performed by: ANESTHESIOLOGY

## 2022-08-22 PROCEDURE — 7100000010 HC PHASE II RECOVERY - FIRST 15 MIN: Performed by: ANESTHESIOLOGY

## 2022-08-22 PROCEDURE — 7100000011 HC PHASE II RECOVERY - ADDTL 15 MIN: Performed by: ANESTHESIOLOGY

## 2022-08-22 PROCEDURE — 3209999900 FLUORO FOR SURGICAL PROCEDURES

## 2022-08-22 PROCEDURE — 27096 INJECT SACROILIAC JOINT: CPT | Performed by: ANESTHESIOLOGY

## 2022-08-22 PROCEDURE — 6360000004 HC RX CONTRAST MEDICATION: Performed by: ANESTHESIOLOGY

## 2022-08-22 PROCEDURE — 3600000002 HC SURGERY LEVEL 2 BASE: Performed by: ANESTHESIOLOGY

## 2022-08-22 PROCEDURE — 6360000002 HC RX W HCPCS: Performed by: ANESTHESIOLOGY

## 2022-08-22 PROCEDURE — 2709999900 HC NON-CHARGEABLE SUPPLY: Performed by: ANESTHESIOLOGY

## 2022-08-22 RX ORDER — BUPIVACAINE HYDROCHLORIDE 2.5 MG/ML
INJECTION, SOLUTION EPIDURAL; INFILTRATION; INTRACAUDAL PRN
Status: DISCONTINUED | OUTPATIENT
Start: 2022-08-22 | End: 2022-08-22 | Stop reason: ALTCHOICE

## 2022-08-22 RX ORDER — SODIUM CHLORIDE 9 MG/ML
INJECTION, SOLUTION INTRAVENOUS PRN
Status: DISCONTINUED | OUTPATIENT
Start: 2022-08-22 | End: 2022-08-22 | Stop reason: HOSPADM

## 2022-08-22 RX ORDER — LIDOCAINE HYDROCHLORIDE 5 MG/ML
INJECTION, SOLUTION INFILTRATION; INTRAVENOUS PRN
Status: DISCONTINUED | OUTPATIENT
Start: 2022-08-22 | End: 2022-08-22 | Stop reason: ALTCHOICE

## 2022-08-22 RX ORDER — SODIUM CHLORIDE 0.9 % (FLUSH) 0.9 %
5-40 SYRINGE (ML) INJECTION EVERY 12 HOURS SCHEDULED
Status: DISCONTINUED | OUTPATIENT
Start: 2022-08-22 | End: 2022-08-22 | Stop reason: HOSPADM

## 2022-08-22 RX ORDER — METHYLPREDNISOLONE ACETATE 40 MG/ML
INJECTION, SUSPENSION INTRA-ARTICULAR; INTRALESIONAL; INTRAMUSCULAR; SOFT TISSUE PRN
Status: DISCONTINUED | OUTPATIENT
Start: 2022-08-22 | End: 2022-08-22 | Stop reason: ALTCHOICE

## 2022-08-22 RX ORDER — SODIUM CHLORIDE 0.9 % (FLUSH) 0.9 %
5-40 SYRINGE (ML) INJECTION PRN
Status: DISCONTINUED | OUTPATIENT
Start: 2022-08-22 | End: 2022-08-22 | Stop reason: HOSPADM

## 2022-08-22 ASSESSMENT — PAIN DESCRIPTION - LOCATION: LOCATION: HIP

## 2022-08-22 ASSESSMENT — PAIN SCALES - GENERAL
PAINLEVEL_OUTOF10: 9
PAINLEVEL_OUTOF10: 0

## 2022-08-22 ASSESSMENT — PAIN DESCRIPTION - ORIENTATION: ORIENTATION: RIGHT

## 2022-08-22 NOTE — INTERVAL H&P NOTE
Update History & Physical    The patient's History and Physical of August 15, 2022 was reviewed with the patient and I examined the patient. There was no change. The surgical site was confirmed by the patient and me. Plan: Right SIJ injection. The risks, benefits, expected outcome, and alternative to the recommended procedure have been discussed with the patient. Patient understands and wants to proceed with the procedure.      Electronically signed by Kelsey Doshi MD on 8/22/2022

## 2022-08-22 NOTE — OP NOTE
Operative Note      Patient: Hunter Coffman  YOB: 1955  MRN: [id number]    Date of Procedure: 2022    Pre-Op Diagnosis: Sacroiliac dysfunction [M53.3]    Post-Op Diagnosis: Same       Procedure(s):  RIGHT SACROILIAC JOINT INJECTION UNDER FLUOROSCOPY      Surgeon(s):  Chad Almonte MD    Assistant:   * No surgical staff found *    Anesthesia: Local    Estimated Blood Loss (mL): Minimal    Complications: None    Specimens:   * No specimens in log *    Implants:  * No implants in log *      Drains: * No LDAs found *    Findings: good needle placement    Detailed Description of Procedure:   2022    Patient: Hunter Coffman  :  1955  Age:  74 y.o. Sex:  female     PRE-OPERATIVE DIAGNOSIS:    Sacroiliac dysfunction    POST-OPERATIVE DIAGNOSIS: Same. PROCEDURE:  Fluoroscopic guided Right   sacroiliac joint injection with steroid. SURGEON:  Chad Almonte MD    ANESTHESIA: Local    ESTIMATED BLOOD LOSS: None.  ______________________________________________________________________  BRIEF HISTORY:  Hunter Coffman comes in today for  Right sacroiliac joint injection under fluoroscopic guidance. The potential complications as well as the procedure in detail were explained to her today. She has elected to undergo the aforementioned procedure. Arminda's complete History & Physical examination were reviewed in depth, a copy of which is in the chart. DESCRIPTION OF PROCEDURE:    After confirming written and informed consent, a time-out was performed and Kerri name and date of birth, the procedure to be performed as well as the plan for the location of the needle insertion were confirmed. The patient was brought into the procedure room and placed in the prone position on the fluoroscopy table. Standard monitors were placed and vital signs were observed throughout the procedure.  The low back and upper buttocks area was prepped with chloraprep and draped in a sterile manner. AP fluoroscopy was used to visualize the sacroiliac joint. The fluoroscopic beam was then obliqued until the anterior and posterior margins of the joint were aligned. The inferior margin of the joint was identified and marked. The skin and subcutaneous tissue about this identified point were anesthestized with 0.5% lidocaine. A 22 gauge 3-1/2 spinal needle was advanced toward the the identified point under fluoroscopic guidance. Once the targeted point was reached and the joint space was entered, negative aspiration was confirmed, and 0.5 cc of Isovue M 200 was injected. The  Joint space was appropriately outlined. Then, after negative aspiration, a solution consisiting of 0.25% marcaine 2 cc and 30 mg DepoMedrol was easily injected. The needle was then removed and the needle insertion site was covered with Band-Aid. Disposition the patient tolerated the procedure well and there were no complications . Vital signs remained stable throughout the procedure. The patient was escorted to the recovery area where they remained until discharge and written discharge instructions for the procedure were given. Plan: Terri Garay will return to our pain management center as scheduled.      Jelly Cadet MD

## 2022-08-23 ENCOUNTER — HOSPITAL ENCOUNTER (OUTPATIENT)
Dept: PHYSICAL THERAPY | Age: 67
Setting detail: THERAPIES SERIES
Discharge: HOME | End: 2022-08-23
Payer: MEDICARE

## 2022-08-23 PROCEDURE — G0283 ELEC STIM OTHER THAN WOUND: HCPCS

## 2022-08-23 PROCEDURE — 97110 THERAPEUTIC EXERCISES: CPT

## 2022-08-23 NOTE — PROGRESS NOTES
[facility]  Phone: [phone] Fax: [phone]       Physical Therapy Daily Treatment Note  Date:  2022    Patient Name:  Eden Harris    :  1955  MRN: [id number]    Restrictions/Precautions:    Diagnosis:  DDD Lumbar, Lumbar Radiculopathy, Lumbosacral Spondylosis without Myelopathy  Treatment Diagnosis:    Insurance/Certification information: Medicare  Referring Physician: Dr. Kyle Hallman of care signed (Y/N):    Visit# / total visits:  3/6 weeks  Pain level: /10  Time In: 7:00       Time Out: 7:45         Subjective:  Pt states she had an injection yesterday and was advised she can have therapy today. Reports no pain this morning noting she took it easy yesterday after having the injection. Exercises:  Exercise/Equipment Resistance/Repetitions Other comments   Bike  5 min    Seated lumbar flex/rotation 10 x each    Hamstring stretch (R) 10\" 5 x     SKTC  10\" 5 x B    Piriformis stretch  (R) 10\" 5 x     Lateral hip stretch (R) 10\" 5 x                                                                                                    Other Therapeutic Activities:      Home Exercise Program:      Manual Treatments:      Modalities:  IFC and MHP to right SI area x 20 min for pain control. Pt seated    Timed Code Treatment Minutes:  45    Total Treatment Minutes:  45    Treatment/Activity Tolerance:  [x] Patient tolerated treatment well [] Patient limited by fatigue  [] Patient limited by pain  [] Patient limited by other medical complications  [x] Other: Good tolerance for exercises without c/o pain.      Plan:   [x] Continue per plan of care [] Alter current plan (see comments)  [] Plan of care initiated [] Hold pending MD visit [] Discharge  Plan for Next Session:         Treatment Charges: Mins Units   Initial Evaluation     Re-Evaluation     Ther Exercise         TE 25 1   Manual Therapy     MT     Ther Activities        TA     Gait Training GT     Neuro Re-education NR     Modalities 20 1   Non-Billable Service Time     Other     Total Time/Units 45 2     Electronically signed by:   Chalino Vincent

## 2022-08-25 ENCOUNTER — HOSPITAL ENCOUNTER (OUTPATIENT)
Dept: PHYSICAL THERAPY | Age: 67
Setting detail: THERAPIES SERIES
Discharge: HOME | End: 2022-08-25
Payer: MEDICARE

## 2022-08-25 PROCEDURE — G0283 ELEC STIM OTHER THAN WOUND: HCPCS

## 2022-08-25 PROCEDURE — 97110 THERAPEUTIC EXERCISES: CPT

## 2022-08-25 NOTE — PROGRESS NOTES
[facility]  Phone: [phone] Fax: [phone]       Physical Therapy Daily Treatment Note  Date:  2022    Patient Name:  Jason Lauren    :  1955  MRN: [id number]    Restrictions/Precautions:    Diagnosis:  DDD Lumbar, Lumbar Radiculopathy, Lumbosacral Spondylosis without Myelopathy  Treatment Diagnosis:    Insurance/Certification information: Medicare  Referring Physician: Dr. Milagros Jain of care signed (Y/N):    Visit# / total visits:  4/6 weeks  Pain level: /10  Time In: 7:05      Time Out: 8:05         Subjective:  Pt states she feels some improvement. Reports she stood for an extended time last evening without pain onset. Exercises:  Exercise/Equipment Resistance/Repetitions Other comments   Bike  10 min    Seated lumbar flex/rotation 10 x each    Hamstring stretch (R) 10\" 5 x     SKTC  10\" 5 x B    LTR 10 x B    Piriformis stretch  (R) 10\" 5 x  (R)    Lateral hip stretch (R) 10\" 5 x  (R)           Pelvic tilt  5\" 10 x    Bridging  4 x Limited by L hamstring cramping   SLR 10 x B            Sit to stand from 20\" box   10 x Cued lumbar stab                                                      Other Therapeutic Activities:      Home Exercise Program:      Manual Treatments:      Modalities:  IFC and MHP to right SI area x 20 min for pain control. Pt seated    Timed Code Treatment Minutes:  60    Total Treatment Minutes:  60    Treatment/Activity Tolerance:  [x] Patient tolerated treatment well [] Patient limited by fatigue  [] Patient limited by pain  [] Patient limited by other medical complications  [x] Other: Good tolerance for progression of exercises without c/o pain. Pt has some limitations when performing exercises related to left knee pain. She did well with modifications.     Plan:   [x] Continue per plan of care [] Alter current plan (see comments)  [] Plan of care initiated [] Hold pending MD visit [] Discharge  Plan for Next Session: Treatment Charges: Mins Units   Initial Evaluation     Re-Evaluation     Ther Exercise         TE 35 2   Manual Therapy     MT     Ther Activities        TA     Gait Training          GT     Neuro Re-education NR     Modalities 20 1   Non-Billable Service Time     Other 5    Total Time/Units 60 3     Electronically signed by:   Chalino Vincent

## 2022-08-30 ENCOUNTER — HOSPITAL ENCOUNTER (OUTPATIENT)
Dept: PHYSICAL THERAPY | Age: 67
Setting detail: THERAPIES SERIES
Discharge: HOME | End: 2022-08-30
Payer: MEDICARE

## 2022-08-30 PROCEDURE — 97110 THERAPEUTIC EXERCISES: CPT

## 2022-08-30 NOTE — PROGRESS NOTES
[facility]  Phone: [phone] Fax: [phone]       Physical Therapy Daily Treatment Note  Date:  2022    Patient Name:  Eduardo Lopez    :  1955  MRN: [id number]    Restrictions/Precautions:    Diagnosis:  DDD Lumbar, Lumbar Radiculopathy, Lumbosacral Spondylosis without Myelopathy  Treatment Diagnosis:    Insurance/Certification information: Medicare  Referring Physician: Dr. Eleni Landaverde of care signed (Y/N):    Visit# / total visits:  5/6 weeks  Pain level: /10  Time In: 7:05      Time Out: 8:45        Subjective:  Pt states she [place] mall. She noted pain increase after walking which eased after she rested and applied ice. She also notes her endurance for walking has improved with less pain after recent walks. She has also joined the [employer]. Exercises:  Exercise/Equipment Resistance/Repetitions Other comments   Bike  10 min    Seated lumbar flex/rotation 10 x each    Hamstring stretch (R) 10\" 5 x     SKTC  10\" 5 x B    LTR 10 x B    Piriformis stretch  (R) 10\" 5 x  (R) nt   Lateral hip stretch (R) 10\" 5 x  (R)           Pelvic tilt  5\" 10 x    Bridging  3 x Limited by L hamstring cramping   SLR 10 x B            Sit to stand from 20\" box   10 x Cued lumbar stab          Seated on ball mini march   10 x B Cued lumbar stab                       Knee ext 10 x B           Tband pull downs BTB 10 x                       rows BTB 10 x                    Other Therapeutic Activities:      Home Exercise Program:      Manual Treatments:      Modalities:   NT per pt    Timed Code Treatment Minutes:  40    Total Treatment Minutes:  40    Treatment/Activity Tolerance:  [x] Patient tolerated treatment well [] Patient limited by fatigue  [] Patient limited by pain  [] Patient limited by other medical complications  [x] Other: Good tolerance for progression of exercises without c/o pain.    Pt reported her back felt better after ex and she did not need stim. Plan:   [x] Continue per plan of care [] Alter current plan (see comments)  [] Plan of care initiated [] Hold pending MD visit [] Discharge  Plan for Next Session:         Treatment Charges: Mins Units   Initial Evaluation     Re-Evaluation     Ther Exercise         TE 40 3   Manual Therapy     MT     Ther Activities        TA     Gait Training          GT     Neuro Re-education NR     Modalities     Non-Billable Service Time     Other     Total Time/Units 40 3     Electronically signed by:   Chalino Vincent

## 2022-09-01 ENCOUNTER — HOSPITAL ENCOUNTER (OUTPATIENT)
Dept: PHYSICAL THERAPY | Age: 67
Setting detail: THERAPIES SERIES
Discharge: HOME | End: 2022-09-01
Payer: MEDICARE

## 2022-09-01 PROCEDURE — 97110 THERAPEUTIC EXERCISES: CPT

## 2022-09-01 NOTE — PROGRESS NOTES
[facility]  Phone: [phone] Fax: [phone]       Physical Therapy Daily Treatment Note  Date:  2022    Patient Name:  Genoveva Dunne    :  1955  MRN: [id number]    Restrictions/Precautions:    Diagnosis:  DDD Lumbar, Lumbar Radiculopathy, Lumbosacral Spondylosis without Myelopathy  Treatment Diagnosis:    Insurance/Certification information: Medicare  Referring Physician: Dr. Eulogio Ortega of care signed (Y/N):    Visit# / total visits:  6/6 weeks  Pain level: /10  Time In: 7:05      Time Out: 7:45        Subjective:  Pt states she has been walked 2 miles without pain increase. Exercises:  Exercise/Equipment Resistance/Repetitions Other comments   Bike  10 min    Seated lumbar flex/rotation 10 x each    Hamstring stretch (R) 10\" 5 x     SKTC  10\" 5 x B    LTR 10 x B    Piriformis stretch  (R) 10\" 5 x  (R) nt   Lateral hip stretch (R) 10\" 5 x  (R)           Pelvic tilt  5\" 10 x    Bridging  NT L hamstring cramp    SLR 10 x B            Sit to stand from 20\" box   10 x Cued lumbar stab          Seated on ball mini march   10 x B Cued lumbar stab                       Knee ext 10 x B           Tband pull downs BTB 10 x 2                       rows BTB 10 x 2    Hip/knee ext w/flexband 10 x B Thin band   Side stepping    nt      Other Therapeutic Activities:      Home Exercise Program:      Manual Treatments:      Modalities:   NT per pt    Timed Code Treatment Minutes:  40    Total Treatment Minutes:  40    Treatment/Activity Tolerance:  [x] Patient tolerated treatment well [] Patient limited by fatigue  [] Patient limited by pain  [] Patient limited by other medical complications  [x] Other: Pt has progressed well toward PT goals. Pt is reporting the ability to stand and walk for longer periods of time without pain onset. Pt is tolerating progression of lumbar stabilization/strengthening exercises.      Plan:   [x] Continue per plan of care [] Javy Chakraborty current plan (see comments)  [] Plan of care initiated [] Hold pending MD visit [] Discharge  Plan for Next Session:         Treatment Charges: Mins Units   Initial Evaluation     Re-Evaluation     Ther Exercise         TE 40 3   Manual Therapy     MT     Ther Activities        TA     Gait Training          GT     Neuro Re-education NR     Modalities     Non-Billable Service Time     Other     Total Time/Units 40 3     Electronically signed by:   Chalino Vincent

## 2022-09-06 ENCOUNTER — HOSPITAL ENCOUNTER (OUTPATIENT)
Dept: PHYSICAL THERAPY | Age: 67
Setting detail: THERAPIES SERIES
Discharge: HOME | End: 2022-09-06
Payer: MEDICARE

## 2022-09-06 PROCEDURE — 97110 THERAPEUTIC EXERCISES: CPT

## 2022-09-06 NOTE — PROGRESS NOTES
[facility]  Phone: [phone] Fax: [phone]       Physical Therapy Daily Treatment Note  Date:  2022    Patient Name:  Shavon Valles    :  1955  MRN: [id number]    Restrictions/Precautions:    Diagnosis:  DDD Lumbar, Lumbar Radiculopathy, Lumbosacral Spondylosis without Myelopathy  Treatment Diagnosis:    Insurance/Certification information: Medicare  Referring Physician: Dr. Tre Hernandez of care signed (Y/N):    Visit# / total visits:  7 weeks  Pain level: /10  Time In: 7:00     Time Out: 7:35        Subjective:  Pt states she has been walking in her pool and notices pain still with prolonged walking. Pt does report she is able to walk longer before onset of pain and when it occurs it is not as severe.      Exercises:  Exercise/Equipment Resistance/Repetitions Other comments   Bike  10 min    Seated lumbar flex/rotation 10 x each    Hamstring stretch (R) 10\" 5 x     SKTC  10\" 5 x B    LTR 10 x B    Piriformis stretch  (R) 10\" 5 x  (R) nt   Lateral hip stretch (R) 10\" 5 x  (R)           Pelvic tilt  5\" 10 x    Bridging  NT L hamstring cramp    SLR 10 x B            Sit to stand from 20\" box   10 x Cued lumbar stab   Prone over ball opp/alt arm/leg lifts   5 x B    Seated on ball mini march   10 x B Cued lumbar stab                       Knee ext 10 x B           Tband pull downs BTB 10 x 2                       rows BTB 10 x 2    Hip/knee ext w/flexband 10 x B                             nt Thin band   Side stepping    RTB 2 laps      Other Therapeutic Activities:      Home Exercise Program:      Manual Treatments:      Modalities:   NT per pt    Timed Code Treatment Minutes:  35    Total Treatment Minutes:  35    Treatment/Activity Tolerance:  [x] Patient tolerated treatment well [] Patient limited by fatigue  [] Patient limited by pain  [] Patient limited by other medical complications  [x] Other: Pt is reporting increased tolerance for prolonged standing/walking and decreased intensity of pain when it occurs. Plan:   [x] Continue per plan of care [] Alter current plan (see comments)  [] Plan of care initiated [] Hold pending MD visit [] Discharge  Plan for Next Session:         Treatment Charges: Mins Units   Initial Evaluation     Re-Evaluation     Ther Exercise         TE 35 2   Manual Therapy     MT     Ther Activities        TA     Gait Training          GT     Neuro Re-education NR     Modalities     Non-Billable Service Time     Other     Total Time/Units 35 2     Electronically signed by:   Chalino Vincent

## 2022-09-08 ENCOUNTER — HOSPITAL ENCOUNTER (OUTPATIENT)
Dept: PHYSICAL THERAPY | Age: 67
Setting detail: THERAPIES SERIES
Discharge: HOME | End: 2022-09-08
Payer: MEDICARE

## 2022-09-08 PROCEDURE — 97110 THERAPEUTIC EXERCISES: CPT

## 2022-09-08 NOTE — PROGRESS NOTES
[facility]  Phone: [phone] Fax: [phone]       Physical Therapy Daily Treatment Note  Date:  2022    Patient Name:  Raysa Schmidt    :  1955  MRN: [id number]    Restrictions/Precautions:    Diagnosis:  DDD Lumbar, Lumbar Radiculopathy, Lumbosacral Spondylosis without Myelopathy  Treatment Diagnosis:    Insurance/Certification information: Medicare  Referring Physician: Dr. Desire August of care signed (Y/N):    Visit# / total visits:  8/6 weeks  Pain level: /10  Time In: 7:00     Time Out: 7:35        Subjective:  Pt states she had some pain yesterday unrelated to any particular activity. States overall she is better since initiating therapy. Exercises:  Exercise/Equipment Resistance/Repetitions Other comments   Bike  10 min    Seated lumbar flex/rotation 10 x each    Hamstring stretch (R) 10\" 5 x     SKTC  10\" 5 x B    LTR 10 x B    Piriformis stretch  (R) 10\" 5 x  (R) nt   Lateral hip stretch (R) 10\" 5 x  (R)           Pelvic tilt  5\" 10 x    Bridging  NT L hamstring cramp    SLR 10 x B            Sit to stand from 20\" box   10 x Cued lumbar stab   Prone over ball opp/alt arm/leg lifts   5 x B  NT- limited tolerance for prone d/t prev abd surg    Seated on ball mini march   10 x B Cued lumbar stab                       Knee ext 10 x B           Tband pull downs BTB 15 x 2                       rows BTB 15 x 2    Hip/knee ext w/flexband 15 x B                              Thin band   Side stepping    RTB 3 laps      Other Therapeutic Activities:      Home Exercise Program:      Manual Treatments:      Modalities:   NT per pt    Timed Code Treatment Minutes:  35    Total Treatment Minutes:  35    Treatment/Activity Tolerance:  [x] Patient tolerated treatment well [] Patient limited by fatigue  [] Patient limited by pain  [] Patient limited by other medical complications  [x] Other: Good tolerance for exercise with no pain c/o.     Plan:   [x] Continue per plan of care [] Alter current plan (see comments)  [] Plan of care initiated [] Hold pending MD visit [] Discharge  Plan for Next Session:         Treatment Charges: Mins Units   Initial Evaluation     Re-Evaluation     Ther Exercise         TE 35 2   Manual Therapy     MT     Ther Activities        TA     Gait Training          GT     Neuro Re-education NR     Modalities     Non-Billable Service Time     Other     Total Time/Units 35 2     Electronically signed by:   Chalino Vincent

## 2022-09-13 ENCOUNTER — HOSPITAL ENCOUNTER (OUTPATIENT)
Dept: PHYSICAL THERAPY | Age: 67
Setting detail: THERAPIES SERIES
Discharge: HOME | End: 2022-09-13
Payer: MEDICARE

## 2022-09-13 PROCEDURE — 97110 THERAPEUTIC EXERCISES: CPT

## 2022-09-13 NOTE — PROGRESS NOTES
[facility]  Phone: [phone] Fax: [phone]       Physical Therapy Daily Treatment Note  Date:  2022    Patient Name:  Blanca Pascal    :  1955  MRN: [id number]    Restrictions/Precautions:    Diagnosis:  DDD Lumbar, Lumbar Radiculopathy, Lumbosacral Spondylosis without Myelopathy  Treatment Diagnosis:    Insurance/Certification information: Medicare  Referring Physician: Dr. Yordan Olmedo of care signed (Y/N):    Visit# / total visits:  / weeks  Pain level: /10  Time In: 7:00     Time Out: 7:35        Subjective:  Pt reports she continues to have pain in right SI region although not as severe as prior to initiating therapy. States she has been up since 3 am and walked 4600 steps already this morning. Exercises:  Exercise/Equipment Resistance/Repetitions Other comments   Bike  10 min    Seated lumbar flex/rotation 10 x each    Hamstring stretch (R) 10\" 5 x     SKTC  10\" 5 x B    LTR 10 x B    Piriformis stretch  (R) 10\" 5 x  (R) nt   Lateral hip stretch (R) 10\" 5 x  (R)           Pelvic tilt  5\" 10 x    Bridging  NT L hamstring cramp    SLR 10 x B            Sit to stand from 20\" box   20 x Cued lumbar stab   Prone over ball opp/alt arm/leg lifts   5 x B  NT- limited tolerance for prone d/t prev abd surg    Seated on ball mini march   10 x B Cued lumbar stab         Knee ext w/opp arm lift 10 x B           Tband pull downs BTB 10 x 2                       rows BTB 10 x 2    Hip/knee ext w/flexband 10 x B                                 Side stepping    RTB 3 laps nt     Other Therapeutic Activities: Cued patient to perform exercises with focus on technique such as holding stretches. Instructed in standing lumbar extension stretch to alleviate fwd flexed posture.      Home Exercise Program:      Manual Treatments:      Modalities:   NT per pt    Timed Code Treatment Minutes:  35    Total Treatment Minutes:  35    Treatment/Activity Tolerance:  [x] Patient tolerated treatment well [] Patient limited by fatigue  [] Patient limited by pain  [] Patient limited by other medical complications  [x] Other: Pt moves quickly through exercises however improved with cue for technique. She is reporting overall improvement in symptoms however some pain persists. Plan:   [x] Continue per plan of care [] Alter current plan (see comments)  [] Plan of care initiated [] Hold pending MD visit [] Discharge  Plan for Next Session:         Treatment Charges: Mins Units   Initial Evaluation     Re-Evaluation     Ther Exercise         TE 35 2   Manual Therapy     MT     Ther Activities        TA     Gait Training          GT     Neuro Re-education NR     Modalities     Non-Billable Service Time     Other     Total Time/Units 35 2     Electronically signed by:   Chalino Vincent

## 2022-09-15 ENCOUNTER — HOSPITAL ENCOUNTER (OUTPATIENT)
Dept: PHYSICAL THERAPY | Age: 67
Setting detail: THERAPIES SERIES
Discharge: HOME | End: 2022-09-15
Payer: MEDICARE

## 2022-09-15 PROCEDURE — 97110 THERAPEUTIC EXERCISES: CPT

## 2022-09-15 NOTE — PROGRESS NOTES
[facility]  Phone: [phone] Fax: [phone]       Physical Therapy Daily Treatment Note  Date:  9/15/2022    Patient Name:  Adwoa Greene    :  1955  MRN: [id number]    Restrictions/Precautions:    Diagnosis:  DDD Lumbar, Lumbar Radiculopathy, Lumbosacral Spondylosis without Myelopathy  Treatment Diagnosis:    Insurance/Certification information: Medicare  Referring Physician: Dr. Citlalli Iverson of care signed (Y/N):    Visit# / total visits:  10/ weeks  Pain level: /10  Time In: 7:00     Time Out: 7:35        Subjective:  Pt states she does still have some pain in right SI region however it has improved since initiating therapy. Continues to report she is able to walk for longer period of time before symptom onset and it is less severe when it occurs.      Exercises:  Exercise/Equipment Resistance/Repetitions Other comments   Bike  10 min    Seated lumbar flex/rotation 10 x each    Hamstring stretch (R) 10\" 5 x     SKTC  10\" 5 x B    LTR 10 x B    Piriformis stretch  (R) 10\" 5 x  (R) nt   Lateral hip stretch (R) 10\" 5 x  (R)           Pelvic tilt  5\" 10 x    Bridging  NT L hamstring cramp    SLR 10 x B            Sit to stand from 20\" box   20 x Cued lumbar stab        Seated on ball mini march   10 x B Cued lumbar stab         Knee ext w/opp arm lift 10 x B           Tband pull downs BTB 10 x 2 w/1 leg extended                      rows BTB 10 x 2 /1 leg extended   Hip/knee ext w/flexband 15 x B                                 Tandem st w/OH ball transfer 10 x      Other Therapeutic Activities:      Home Exercise Program:      Manual Treatments:      Modalities:   NT per pt    Timed Code Treatment Minutes:  35    Total Treatment Minutes:  35    Treatment/Activity Tolerance:  [x] Patient tolerated treatment well [] Patient limited by fatigue  [] Patient limited by pain  [] Patient limited by other medical complications  [x] Other: Pt has progressed well toward PT goals.   Right SI pain has decreased in frequency and intensity. Pt demonstrates good knowledge of her exercises. Plan:   [] Continue per plan of care [] Alter current plan (see comments)  [] Plan of care initiated [x] Hold pending MD visit [] Discharge  Plan for Next Session: Pt has a follow up with physician on 9/21/22. She is to call after that appointment regarding further therapy. Treatment Charges: Mins Units   Initial Evaluation     Re-Evaluation     Ther Exercise         TE 35 2   Manual Therapy     MT     Ther Activities        TA     Gait Training          GT     Neuro Re-education NR     Modalities     Non-Billable Service Time     Other     Total Time/Units 35 2     Electronically signed by:   Chalino Vincent

## 2022-09-21 ENCOUNTER — OFFICE VISIT (OUTPATIENT)
Dept: PAIN MANAGEMENT | Age: 67
End: 2022-09-21
Payer: MEDICARE

## 2022-09-21 ENCOUNTER — PREP FOR PROCEDURE (OUTPATIENT)
Dept: PAIN MANAGEMENT | Age: 67
End: 2022-09-21

## 2022-09-21 VITALS
WEIGHT: 161 LBS | DIASTOLIC BLOOD PRESSURE: 58 MMHG | OXYGEN SATURATION: 98 % | BODY MASS INDEX: 26.82 KG/M2 | TEMPERATURE: 98.1 F | HEIGHT: 65 IN | SYSTOLIC BLOOD PRESSURE: 105 MMHG | RESPIRATION RATE: 16 BRPM

## 2022-09-21 DIAGNOSIS — M47.817 LUMBOSACRAL SPONDYLOSIS WITHOUT MYELOPATHY: ICD-10-CM

## 2022-09-21 DIAGNOSIS — M53.3 SACROILIAC DYSFUNCTION: Primary | ICD-10-CM

## 2022-09-21 DIAGNOSIS — M51.36 DDD (DEGENERATIVE DISC DISEASE), LUMBAR: ICD-10-CM

## 2022-09-21 PROCEDURE — G8417 CALC BMI ABV UP PARAM F/U: HCPCS | Performed by: ANESTHESIOLOGY

## 2022-09-21 PROCEDURE — G8427 DOCREV CUR MEDS BY ELIG CLIN: HCPCS | Performed by: ANESTHESIOLOGY

## 2022-09-21 PROCEDURE — 1090F PRES/ABSN URINE INCON ASSESS: CPT | Performed by: ANESTHESIOLOGY

## 2022-09-21 PROCEDURE — 1123F ACP DISCUSS/DSCN MKR DOCD: CPT | Performed by: ANESTHESIOLOGY

## 2022-09-21 PROCEDURE — 1036F TOBACCO NON-USER: CPT | Performed by: ANESTHESIOLOGY

## 2022-09-21 PROCEDURE — 3017F COLORECTAL CA SCREEN DOC REV: CPT | Performed by: ANESTHESIOLOGY

## 2022-09-21 PROCEDURE — 99213 OFFICE O/P EST LOW 20 MIN: CPT | Performed by: ANESTHESIOLOGY

## 2022-09-21 PROCEDURE — G8399 PT W/DXA RESULTS DOCUMENT: HCPCS | Performed by: ANESTHESIOLOGY

## 2022-09-21 RX ORDER — SODIUM CHLORIDE 0.9 % (FLUSH) 0.9 %
5-40 SYRINGE (ML) INJECTION PRN
Status: CANCELLED | OUTPATIENT
Start: 2022-09-21

## 2022-09-21 RX ORDER — SODIUM CHLORIDE 9 MG/ML
INJECTION, SOLUTION INTRAVENOUS PRN
Status: CANCELLED | OUTPATIENT
Start: 2022-09-21

## 2022-09-21 RX ORDER — SODIUM CHLORIDE 0.9 % (FLUSH) 0.9 %
5-40 SYRINGE (ML) INJECTION EVERY 12 HOURS SCHEDULED
Status: CANCELLED | OUTPATIENT
Start: 2022-09-21

## 2022-09-21 NOTE — PROGRESS NOTES
Do you currently have any of the following:    Fever: No  Headache:  No  Cough: No  Shortness of breath: No  Exposed to anyone with these symptoms: No         Preethi Scott presents to the [facility] on 9/21/2022. [facility] is complaining of pain in her buttock. The pain is constant. The pain is described as aching, dull, sharp, and miserable. Pain is rated on her best day at a 4, on her worst day at a 9, and on average at a 7 on the VAS scale. Any procedures since your last visit: Yes, with 30 % relief. Pacemaker or defibrillator: No     She is not on NSAIDS and is not on anticoagulation medications. Medication Contract and Consent for Opioid Use Documents Filed        No documents found                    BP (!) 105/58   Temp 98.1 °F (36.7 °C) (Infrared)   Resp 16   Ht 5' 5\" (1.651 m)   Wt 161 lb (73 kg)   SpO2 98%   BMI 26.79 kg/m²      No LMP recorded. Patient has had a hysterectomy.

## 2022-09-21 NOTE — PROGRESS NOTES
[facility]        [address]  [facility] [address]   Dept: [phone]      Follow up Note      Porfirio Morerony     Date of Visit:  9/21/2022    CC:  Patient presents for follow up   Chief Complaint   Patient presents with    Follow-up     RIGHT SACROILIAC JOINT INJECTION UNDER FLUOROSCOPY         HPI:  Low back pain over the right lower lumbar area and intermittent right LE pain mainly over the right thigh. Pain causes functional limitations/ limits Adl's : Yes     Prior treatment at [facility]- in the past.     Has been evaluated by ortho recently to r/o hip pathology. Has been evaluated buy NSG - had MRI of LS spine and referred for interventions. Notes:  S/P Gastric bypass surgery. On Medical Marijuana. Previous treatments:  Physical Therapy /Chiropractic treatment/ HEP: yes,      Medications: - NSAID's : yes - caution due to h/o gastric bypass                       - Membrane stabilizers : yes - gabapentin                       - Opioids : no                       - Adjuvants or Others : yes,     Spine Surgeries: no     Anticoagulation medications: no.     H/O Smoking: no  H/O alcohol abuse : no  H/O Illicit drug use : denies. Currently Uses medical marijuana     Employment: retired     Imaging:   MRI of [address]: 7/11/2022:  Impression   1. No fracture or bony destructive lesion. 2. Severe central canal stenosis at L3-4. Moderate stenoses at L1-2 and   L2-3. Mild stenoses at L4-5 and L5-S1.   3.  Multilevel neural foraminal stenoses, worst (severe) at the left L3-4,   right L4-5 and bilateral L5-S1 levels. 4. Multilevel stenoses of the lateral recess, worse (severe) at the right   L5-S1 level, resulting in significant impingement of the right S1 nerve. OARRS report[de-identified] reviewed.     Past Medical History:   Diagnosis Date    Arthritis     Chronic back pain     Chronic hip pain     Constipation     Diabetes mellitus ([address]     no longer on meds since wt loss surgery    Hyperlipidemia     Hypertension     no meds since weight loss surgery    Nausea & vomiting     PONV (postoperative nausea and vomiting)     Ringing in ears     Spinal stenosis        Past Surgical History:   Procedure Laterality Date    CARPAL TUNNEL RELEASE Bilateral 2007    Dr. Piter Conroy  Aug 2014    Laparoscopic    COLONOSCOPY  2007    HYSTERECTOMY (CERVIX STATUS UNKNOWN)  2005,Dr. Velasquez,[place]    JOINT REPLACEMENT  2007    lt knee     NERVE BLOCK Right 8/22/2022    RIGHT SACROILIAC JOINT INJECTION UNDER FLUOROSCOPY performed by Doss Schilder, MD at [address] [facility]  Right 7/25/2022    LUMBAR TRANSFORAMINAL EPIDURAL STEROID INJECTION RIGHT L3 AND L4 UNDER FLUOROSCOPIC GUIDANCE performed by Doss Schilder, MD at [facility]  12/08/2014    Laparoscopic    [address]  left,2007, Dr. Terri Barragan Right 02/14/2011    Right TKA JOSEPH Choudhary MD    UPPER GASTROINTESTINAL ENDOSCOPY  7/9/2014    Dr. Markie Gtz, [facility], Findings: Mild Gastritis       Prior to Admission medications    Medication Sig Start Date End Date Taking? Authorizing Provider   vitamin B-12 (CYANOCOBALAMIN) 500 MCG tablet Take 500 mcg by mouth every other day   Yes Historical Provider, MD   Famotidine-Ca Carb-Mag Hydrox (PEPCID COMPLETE PO) Take 1 tablet by mouth daily as needed    Yes Historical Provider, MD   vitamin E 400 UNIT capsule Take 400 Units by mouth every other day    Yes Historical Provider, MD   melatonin 10 MG CAPS capsule Take 10 mg by mouth nightly as needed    Yes Historical Provider, MD   medical marijuana Take by mouth as needed.  Gummies  / Dianna Peaches   Yes Historical Provider, MD   atorvastatin (LIPITOR) 40 MG tablet Take 40 mg by mouth daily   Yes Historical Provider, MD   Ferrous Sulfate (IRON PO) Take 1 tablet by mouth every 3 days    Yes Historical Provider, MD   ketotifen (ZADITOR) 0.025 % ophthalmic solution 1 drop 2 times daily   Yes Historical Provider, MD   BLACK CURRANT SEED OIL PO Take by mouth daily   Yes Historical Provider, MD   Multiple Vitamin (MVI, BARIATRIC ADVANTAGE MULTI-FORMULA, CHEW TAB) Take 1 tablet by mouth 2 times daily    Yes Historical Provider, MD   Calcium Citrate-Vitamin D (CA CITR+VIT D,CELEBRATE CALCIUM PLUS 500, TAB) 1 tablet 3 times daily. Yes Historical Provider, MD   Cholecalciferol (VITAMIN D PO) Take 5,000 Units by mouth every other day. Bariatric   Yes Historical Provider, MD       Allergies   Allergen Reactions    Penicillins Hives and Rash    Sulfa Antibiotics Hives and Rash       Social History     Socioeconomic History    Marital status: Single     Spouse name: Not on file    Number of children: Not on file    Years of education: Not on file    Highest education level: Not on file   Occupational History    Not on file   Tobacco Use    Smoking status: Former     Packs/day: 1.50     Years: 4.00     Pack years: 6.00     Types: Cigarettes     Quit date: 1977     Years since quittin.3    Smokeless tobacco: Never   Vaping Use    Vaping Use: Never used   Substance and Sexual Activity    Alcohol use:  Yes     Alcohol/week: 0.8 standard drinks     Types: 1 Standard drinks or equivalent per week     Comment: Occasionally    Drug use: Yes     Types: Marijuana Libia Kos)     Comment: Medical Marijuana (nightly)    Sexual activity: Not Currently   Other Topics Concern    Not on file   Social History Narrative    Not on file     Social Determinants of Health     Financial Resource Strain: Not on file   Food Insecurity: Not on file   Transportation Needs: Not on file   Physical Activity: Not on file   Stress: Not on file   Social Connections: Not on file   Intimate Partner Violence: Not on file   Housing Stability: Not on file       Family History   Problem Relation Age of Onset    High Blood Pressure Mother     Diabetes Mother Diabetes Father     High Blood Pressure Father     High Blood Pressure Brother     Diabetes Paternal Grandfather        REVIEW OF SYSTEMS:     ORLÉANS denies fever/chills, chest pain, shortness of breath, new bowel or bladder complaints. All other review of systems was negative. PHYSICAL EXAMINATION:      BP (!) 105/58   Temp 98.1 °F (36.7 °C) (Infrared)   Resp 16   Ht 5' 5\" (1.651 m)   Wt 161 lb (73 kg)   SpO2 98%   BMI 26.79 kg/m²   General:       General appearance:  Pleasant and well-hydrated, in no distress and A & O x 3  Build:Overweight  Function: Rises from seated position easily     HEENT:     Head:normocephalic, atraumatic  Pupils:regular, round, equal  Sclera: icterus absent     Lungs:     Breathing:normal breathing pattern     CVS:     RRR     Abdomen:     Shape:non-distended and normal     Cervical spine:     Inspection:normal     Thoracic spine:                Spine inspection:normal       Lumbar spine:     Spine inspection: Normal  Palpation: Tenderness paravertebral muscles No bilaterally  Range of motion: Decreased, flexion Decreased, Lateral bending, extension and rotation bilaterally reduced.   Sacroiliac joint tenderness + right  JANET test:+ right   Gaenslen's test:+ right  Piriformis tenderness: negative bilaterally  SLR : negative bilaterally     Musculoskeletal:     Trigger points No      Extremities:     Tremors:None bilaterally upper and lower  Edema:no     Neurological:     Sensory: Normal to light touch     Motor:  Right  5/5              Left  5/5               Right Bicep 5/5           Left Bicep 5/5              Right Triceps 5/5       Left Triceps 5/5          Right Deltoid 5/5     Left Deltoid 5/5                  Right Quadriceps 5/5          Left Quadriceps 5/5           Right Gastrocnemius 5/5    Left Gastrocnemius 5/5  Right Ant Tibialis 5/5  Left Ant Tibialis 5/5     Dermatology:     Skin:no rashes or lesions noted     Assessment/Plan:       Diagnosis Orders 1. Spinal stenosis of lumbar region, unspecified whether neurogenic claudication present         2. DDD (degenerative disc disease), lumbar         3. Lumbar radicular pain         4. Lumbosacral spondylosis without myelopathy         5. S/P gastric bypass              74 y.o. female with h/o low back pain and right LE pain. Failed conservative treatment     MRI LS spine : multi level DDD/ stenosis/ foraminal narrowing. Has been evaluated by NSG- referred for interventions     S/P Gastric bypass     On Medical Marijuana. On gabapentin. S/P Lumbar TFESI right L3 & L4 good relief of LE pain. Continues to have significant pain over the right SIJ. Features of Right SIJ pain. Doing PT/ HEP. S/p # 1 Right SIJ injection with > 80% relief for few weeks with excellent pain relief and improved functionality- could walk for > a mile. Now pain has recurred and notices only 30% relief. Doing Hep   Right SIJ tenderness +     PLAN:     # 2 Right SIJ injection under fluoroscopy. RBA discussed - patient agreed. Consider [place] JOSE in future. Physical therapy. If interventions does not help, consider NSG re-eval.     Counseling : Patient encouraged to stay active and to continue Regular home exercise program as tolerated - stretching / strengthening. Treatment plan discussed with the patient including medication and procedure side effects. Controlled Substances Monitoring:   OARRS reviewed- medical marijuana.       Bijal Locke MD

## 2022-09-21 NOTE — H&P (VIEW-ONLY)
[facility]        [address]  [place], [address] [facility]  Dept: [phone]      Follow up Note      Alena Webb     Date of Visit:  9/21/2022    CC:  Patient presents for follow up   Chief Complaint   Patient presents with    Follow-up     RIGHT SACROILIAC JOINT INJECTION UNDER FLUOROSCOPY         HPI:  Low back pain over the right lower lumbar area and intermittent right LE pain mainly over the right thigh. Pain causes functional limitations/ limits Adl's : Yes     Prior treatment at [facility]- in the past.     Has been evaluated by ortho recently to r/o hip pathology. Has been evaluated buy NSG - had MRI of LS spine and referred for interventions. Notes:  S/P Gastric bypass surgery. On Medical Marijuana. Previous treatments:  Physical Therapy /Chiropractic treatment/ HEP: yes,      Medications: - NSAID's : yes - caution due to h/o gastric bypass                       - Membrane stabilizers : yes - gabapentin                       - Opioids : no                       - Adjuvants or Others : yes,     Spine Surgeries: no     Anticoagulation medications: no.     H/O Smoking: no  H/O alcohol abuse : no  H/O Illicit drug use : denies. Currently Uses medical marijuana     Employment: retired     Imaging:   MRI of [address] 82: 7/11/2022:  Impression   1. No fracture or bony destructive lesion. 2. Severe central canal stenosis at L3-4. Moderate stenoses at L1-2 and   L2-3. Mild stenoses at L4-5 and L5-S1.   3.  Multilevel neural foraminal stenoses, worst (severe) at the left L3-4,   right L4-5 and bilateral L5-S1 levels. 4. Multilevel stenoses of the lateral recess, worse (severe) at the right   L5-S1 level, resulting in significant impingement of the right S1 nerve. OARRS report[de-identified] reviewed.     Past Medical History:   Diagnosis Date    Arthritis     Chronic back pain     Chronic hip pain     Constipation     Diabetes mellitus ([address]     no longer on meds since wt loss surgery    Hyperlipidemia     Hypertension     no meds since weight loss surgery    Nausea & vomiting     PONV (postoperative nausea and vomiting)     Ringing in ears     Spinal stenosis        Past Surgical History:   Procedure Laterality Date    CARPAL TUNNEL RELEASE Bilateral 2007    Dr. Joshua Baldwin  Aug 2014    Laparoscopic    COLONOSCOPY  2007    HYSTERECTOMY (CERVIX STATUS UNKNOWN)  2005,Dr. Velasquez,[place]    JOINT REPLACEMENT  2007    lt knee     NERVE BLOCK Right 8/22/2022    RIGHT SACROILIAC JOINT INJECTION UNDER FLUOROSCOPY performed by Ruth Ly MD at [address] [place] [address] Right 7/25/2022    LUMBAR TRANSFORAMINAL EPIDURAL STEROID INJECTION RIGHT L3 AND L4 UNDER FLUOROSCOPIC GUIDANCE performed by Ruth Ly MD at [address] [place] [address]  12/08/2014    Laparoscopic    [address]  left,2007, Dr. Palmer Shall Right 02/14/2011    Right TKA JOSEPH Lang MD    UPPER GASTROINTESTINAL ENDOSCOPY  7/9/2014    Dr. Tatiana Luo, [facility], Findings: Mild Gastritis       Prior to Admission medications    Medication Sig Start Date End Date Taking? Authorizing Provider   vitamin B-12 (CYANOCOBALAMIN) 500 MCG tablet Take 500 mcg by mouth every other day   Yes Historical Provider, MD   Famotidine-Ca Carb-Mag Hydrox (PEPCID COMPLETE PO) Take 1 tablet by mouth daily as needed    Yes Historical Provider, MD   vitamin E 400 UNIT capsule Take 400 Units by mouth every other day    Yes Historical Provider, MD   melatonin 10 MG CAPS capsule Take 10 mg by mouth nightly as needed    Yes Historical Provider, MD   medical marijuana Take by mouth as needed.  Gummies  / Katheleen Monday   Yes Historical Provider, MD   atorvastatin (LIPITOR) 40 MG tablet Take 40 mg by mouth daily   Yes Historical Provider, MD   Ferrous Sulfate (IRON PO) Take 1 tablet by mouth every 3 days    Yes Historical Provider, MD   ketotifen (ZADITOR) 0.025 % ophthalmic solution 1 drop 2 times daily   Yes Historical Provider, MD   BLACK CURRANT SEED OIL PO Take by mouth daily   Yes Historical Provider, MD   Multiple Vitamin (MVI, BARIATRIC ADVANTAGE MULTI-FORMULA, CHEW TAB) Take 1 tablet by mouth 2 times daily    Yes Historical Provider, MD   Calcium Citrate-Vitamin D (CA CITR+VIT D,CELEBRATE CALCIUM PLUS 500, TAB) 1 tablet 3 times daily. Yes Historical Provider, MD   Cholecalciferol (VITAMIN D PO) Take 5,000 Units by mouth every other day. Bariatric   Yes Historical Provider, MD       Allergies   Allergen Reactions    Penicillins Hives and Rash    Sulfa Antibiotics Hives and Rash       Social History     Socioeconomic History    Marital status: Single     Spouse name: Not on file    Number of children: Not on file    Years of education: Not on file    Highest education level: Not on file   Occupational History    Not on file   Tobacco Use    Smoking status: Former     Packs/day: 1.50     Years: 4.00     Pack years: 6.00     Types: Cigarettes     Quit date: 1977     Years since quittin.3    Smokeless tobacco: Never   Vaping Use    Vaping Use: Never used   Substance and Sexual Activity    Alcohol use:  Yes     Alcohol/week: 0.8 standard drinks     Types: 1 Standard drinks or equivalent per week     Comment: Occasionally    Drug use: Yes     Types: Marijuana [place])     Comment: Medical Marijuana (nightly)    Sexual activity: Not Currently   Other Topics Concern    Not on file   Social History Narrative    Not on file     Social Determinants of Health     Financial Resource Strain: Not on file   Food Insecurity: Not on file   Transportation Needs: Not on file   Physical Activity: Not on file   Stress: Not on file   Social Connections: Not on file   Intimate Partner Violence: Not on file   Housing Stability: Not on file       Family History   Problem Relation Age of Onset    High Blood Pressure Mother     Diabetes Mother Diabetes Father     High Blood Pressure Father     High Blood Pressure Brother     Diabetes Paternal Grandfather        REVIEW OF SYSTEMS:     [facility] & [facility] [place] denies fever/chills, chest pain, shortness of breath, new bowel or bladder complaints. All other review of systems was negative. PHYSICAL EXAMINATION:      BP (!) 105/58   Temp 98.1 °F (36.7 °C) (Infrared)   Resp 16   Ht 5' 5\" (1.651 m)   Wt 161 lb (73 kg)   SpO2 98%   BMI 26.79 kg/m²   General:       General appearance:  Pleasant and well-hydrated, in no distress and A & O x 3  Build:Overweight  Function: Rises from seated position easily     HEENT:     Head:normocephalic, atraumatic  Pupils:regular, round, equal  Sclera: icterus absent     Lungs:     Breathing:normal breathing pattern     CVS:     RRR     Abdomen:     Shape:non-distended and normal     Cervical spine:     Inspection:normal     Thoracic spine:                Spine inspection:normal       Lumbar spine:     Spine inspection: Normal  Palpation: Tenderness paravertebral muscles No bilaterally  Range of motion: Decreased, flexion Decreased, Lateral bending, extension and rotation bilaterally reduced.   Sacroiliac joint tenderness + right  JANET test:+ right   Gaenslen's test:+ right  Piriformis tenderness: negative bilaterally  SLR : negative bilaterally     Musculoskeletal:     Trigger points No      Extremities:     Tremors:None bilaterally upper and lower  Edema:no     Neurological:     Sensory: Normal to light touch     Motor:  Right  5/5              Left  5/5               Right Bicep 5/5           Left Bicep 5/5              Right Triceps 5/5       Left Triceps 5/5          Right Deltoid 5/5     Left Deltoid 5/5                  Right Quadriceps 5/5          Left Quadriceps 5/5           Right Gastrocnemius 5/5    Left Gastrocnemius 5/5  Right Ant Tibialis 5/5  Left Ant Tibialis 5/5     Dermatology:     Skin:no rashes or lesions noted     Assessment/Plan:       Diagnosis Orders 1. Spinal stenosis of lumbar region, unspecified whether neurogenic claudication present         2. DDD (degenerative disc disease), lumbar         3. Lumbar radicular pain         4. Lumbosacral spondylosis without myelopathy         5. S/P gastric bypass              74 y.o. female with h/o low back pain and right LE pain. Failed conservative treatment     MRI LS spine : multi level DDD/ stenosis/ foraminal narrowing. Has been evaluated by NSG- referred for interventions     S/P Gastric bypass     On Medical Marijuana. On gabapentin. S/P Lumbar TFESI right L3 & L4 good relief of LE pain. Continues to have significant pain over the right SIJ. Features of Right SIJ pain. Doing PT/ HEP. S/p # 1 Right SIJ injection with > 80% relief for few weeks with excellent pain relief and improved functionality- could walk for > a mile. Now pain has recurred and notices only 30% relief. Doing Hep   Right SIJ tenderness +     PLAN:     # 2 Right SIJ injection under fluoroscopy. RBA discussed - patient agreed. Consider [place] JOSE in future. Physical therapy. If interventions does not help, consider NSG re-eval.     Counseling : Patient encouraged to stay active and to continue Regular home exercise program as tolerated - stretching / strengthening. Treatment plan discussed with the patient including medication and procedure side effects. Controlled Substances Monitoring:   OARRS reviewed- medical marijuana.       Lacho James MD

## 2022-10-03 RX ORDER — PLECANATIDE 3 MG/1
3 TABLET ORAL DAILY
COMMUNITY

## 2022-10-03 NOTE — PROGRESS NOTES
[facility]  INSTRUCTIONS  . .......................................................................................................................................... [x] Parking the day of Surgery is located in the [facility].   Upon entering the door, make immediate right into the surgery reception room    [x]  Bring photo ID and insurance card     [x] You may have a light breakfast day of procedure    [x]  Wear loose comfortable clothing    [x]  Please follow instructions for medications as given per Dr's office    [x] You can expect a call the business day prior to procedure to notify you of your arrival time     [x] Please arrange for     []  Other instructions

## 2022-10-06 ENCOUNTER — HOSPITAL ENCOUNTER (OUTPATIENT)
Age: 67
Setting detail: OUTPATIENT SURGERY
Discharge: HOME | End: 2022-10-06
Attending: ANESTHESIOLOGY | Admitting: ANESTHESIOLOGY
Payer: MEDICARE

## 2022-10-06 ENCOUNTER — HOSPITAL ENCOUNTER (OUTPATIENT)
Dept: GENERAL RADIOLOGY | Age: 67
Setting detail: OUTPATIENT SURGERY
Discharge: HOME | End: 2022-10-08
Attending: ANESTHESIOLOGY
Payer: MEDICARE

## 2022-10-06 VITALS
RESPIRATION RATE: 14 BRPM | SYSTOLIC BLOOD PRESSURE: 156 MMHG | TEMPERATURE: 97.5 F | HEIGHT: 65 IN | WEIGHT: 161 LBS | HEART RATE: 56 BPM | BODY MASS INDEX: 26.82 KG/M2 | OXYGEN SATURATION: 99 % | DIASTOLIC BLOOD PRESSURE: 75 MMHG

## 2022-10-06 DIAGNOSIS — R52 PAIN MANAGEMENT: ICD-10-CM

## 2022-10-06 PROCEDURE — 7100000010 HC PHASE II RECOVERY - FIRST 15 MIN: Performed by: ANESTHESIOLOGY

## 2022-10-06 PROCEDURE — 6360000004 HC RX CONTRAST MEDICATION: Performed by: ANESTHESIOLOGY

## 2022-10-06 PROCEDURE — 3209999900 FLUORO FOR SURGICAL PROCEDURES

## 2022-10-06 PROCEDURE — 2500000003 HC RX 250 WO HCPCS: Performed by: ANESTHESIOLOGY

## 2022-10-06 PROCEDURE — 2709999900 HC NON-CHARGEABLE SUPPLY: Performed by: ANESTHESIOLOGY

## 2022-10-06 PROCEDURE — 27096 INJECT SACROILIAC JOINT: CPT | Performed by: ANESTHESIOLOGY

## 2022-10-06 PROCEDURE — 6360000002 HC RX W HCPCS: Performed by: ANESTHESIOLOGY

## 2022-10-06 PROCEDURE — 7100000011 HC PHASE II RECOVERY - ADDTL 15 MIN: Performed by: ANESTHESIOLOGY

## 2022-10-06 PROCEDURE — 3600000002 HC SURGERY LEVEL 2 BASE: Performed by: ANESTHESIOLOGY

## 2022-10-06 RX ORDER — LIDOCAINE HYDROCHLORIDE 5 MG/ML
INJECTION, SOLUTION INFILTRATION; INTRAVENOUS PRN
Status: DISCONTINUED | OUTPATIENT
Start: 2022-10-06 | End: 2022-10-06 | Stop reason: ALTCHOICE

## 2022-10-06 RX ORDER — METHYLPREDNISOLONE ACETATE 40 MG/ML
INJECTION, SUSPENSION INTRA-ARTICULAR; INTRALESIONAL; INTRAMUSCULAR; SOFT TISSUE PRN
Status: DISCONTINUED | OUTPATIENT
Start: 2022-10-06 | End: 2022-10-06 | Stop reason: ALTCHOICE

## 2022-10-06 RX ORDER — SODIUM CHLORIDE 0.9 % (FLUSH) 0.9 %
5-40 SYRINGE (ML) INJECTION EVERY 12 HOURS SCHEDULED
Status: DISCONTINUED | OUTPATIENT
Start: 2022-10-06 | End: 2022-10-06 | Stop reason: HOSPADM

## 2022-10-06 RX ORDER — BUPIVACAINE HYDROCHLORIDE 2.5 MG/ML
INJECTION, SOLUTION EPIDURAL; INFILTRATION; INTRACAUDAL PRN
Status: DISCONTINUED | OUTPATIENT
Start: 2022-10-06 | End: 2022-10-06 | Stop reason: ALTCHOICE

## 2022-10-06 RX ORDER — SODIUM CHLORIDE 0.9 % (FLUSH) 0.9 %
5-40 SYRINGE (ML) INJECTION PRN
Status: DISCONTINUED | OUTPATIENT
Start: 2022-10-06 | End: 2022-10-06 | Stop reason: HOSPADM

## 2022-10-06 RX ORDER — SODIUM CHLORIDE 9 MG/ML
INJECTION, SOLUTION INTRAVENOUS PRN
Status: DISCONTINUED | OUTPATIENT
Start: 2022-10-06 | End: 2022-10-06 | Stop reason: HOSPADM

## 2022-10-06 ASSESSMENT — PAIN - FUNCTIONAL ASSESSMENT: PAIN_FUNCTIONAL_ASSESSMENT: 0-10

## 2022-10-06 NOTE — DISCHARGE INSTRUCTIONS
[facility]/Radiofrequency  Home Going Instructions    1-Go home, rest for the remainder of the day  2-Please do not lift over 20 pounds the day of the injection  3-If you received sedation No: alcohol, driving, operating lawn mowers, plows, tractors or other dangerous equipment until next morning. Do not make important decisions or sign legal documents for 24 hours. You may experience light headedness, dizziness, nausea or sleepiness after sedation. Do not stay alone. A responsible adult must be with you for 24 hours. You could be nauseated from the medications you have received. Your IV site may be sore and bruised. 4-No dietary restrictions     5-Resume all medications the same day, blood thinners to be resumed 24 hours after injection if you were instructed to stop any. 6-Keep the surgical site clean and dry, you may shower the next morning and remove the      dressing. 7- No sitz baths, tub baths or hot tubs/swimming for 24 hours. 8- If you have any pain at the injection site(s), application of an ice pack to the area should be       helpful, 20 minutes on/20 minutes off for next 48 hours. 9- Call [facility] immediately at if you develop.   Fever greater than 100.4 F  Have bleeding or drainage from the puncture site  Have progressive Leg/arm numbness and or weakness  Loss of control of bowel and or bladder (wet/soil yourself)  Severe headache with inability to lift head  10-You may return to work the next day

## 2022-10-06 NOTE — INTERVAL H&P NOTE
Update History & Physical    The patient's History and Physical of September 21, 2022 was reviewed with the patient and I examined the patient. There was no change. The surgical site was confirmed by the patient and me. Plan: Right SIJ injection. The risks, benefits, expected outcome, and alternative to the recommended procedure have been discussed with the patient. Patient understands and wants to proceed with the procedure.      Electronically signed by Shubham Washington MD on 10/6/2022

## 2022-10-06 NOTE — OP NOTE
Operative Note      Patient: Ida Morales  YOB: 1955  MRN: [id number]    Date of Procedure: 10/6/2022    Pre-Op Diagnosis: Sacroiliac Dysfunction    Post-Op Diagnosis: Same       Procedure(s):  #2 RIGHT SACROILIAC JOINT INJECTION UNDER FLUOROSCOPY      Surgeon(s):  King Suresh MD    Assistant:   * No surgical staff found *    Anesthesia: Local    Estimated Blood Loss (mL): Minimal    Complications: None    Specimens:   * No specimens in log *    Implants:  * No implants in log *      Drains: * No LDAs found *    Findings: good needle placement    Detailed Description of Procedure:   10/6/2022    Patient: Ida Morales  :  1955  Age:  74 y.o. Sex:  female     PRE-OPERATIVE DIAGNOSIS:    SACROILIAC DYSFUNCTION    POST-OPERATIVE DIAGNOSIS: Same. PROCEDURE:  Fluoroscopic guided Right   sacroiliac joint injection with steroid # 2    SURGEON:  King Suresh MD    ANESTHESIA: lOCAL    ESTIMATED BLOOD LOSS: None.  ______________________________________________________________________  BRIEF HISTORY:  Ida Morales comes in today for second Right sacroiliac joint injection under fluoroscopic guidance. The potential complications as well as the procedure in detail were explained to her today. She has elected to undergo the aforementioned procedure. Arminda's complete History & Physical examination were reviewed in depth, a copy of which is in the chart. DESCRIPTION OF PROCEDURE:    After confirming written and informed consent, a time-out was performed and Kerri name and date of birth, the procedure to be performed as well as the plan for the location of the needle insertion were confirmed. The patient was brought into the procedure room and placed in the prone position on the fluoroscopy table. Standard monitors were placed and vital signs were observed throughout the procedure.  The low back and upper buttocks area was prepped with chloraprep and draped in a sterile manner. AP fluoroscopy was used to visualize the sacroiliac joint. The fluoroscopic beam was then obliqued until the anterior and posterior margins of the joint were aligned. The inferior margin of the joint was identified and marked. The skin and subcutaneous tissue about this identified point were anesthestized with 0.5% lidocaine. A 22 gauge 3 1/2 spinal needle was advanced toward the the identified point under fluoroscopic guidance. Once the targeted point was reached and the joint space was entered, negative aspiration was confirmed, and 0.5 cc of iSOVUE M-200 was injected. The  Joint space was appropriately outlined. Then, after negative aspiration, a solution consisiting of 0.25% marcaine 2 cc and 40 mg DepoMedrol was easily injected. The needle was then removed and the needle insertion site was covered with Band-Aid. Disposition the patient tolerated the procedure well and there were no complications . Vital signs remained stable throughout the procedure. The patient was escorted to the recovery area where they remained until discharge and written discharge instructions for the procedure were given. Plan: April Guzman will return to our pain management center as scheduled.      Theodora Lazaro MD

## 2022-11-07 ENCOUNTER — TELEPHONE (OUTPATIENT)
Dept: BARIATRICS/WEIGHT MGMT | Age: 67
End: 2022-11-07

## 2022-11-16 ENCOUNTER — PREP FOR PROCEDURE (OUTPATIENT)
Dept: PAIN MANAGEMENT | Age: 67
End: 2022-11-16

## 2022-11-16 ENCOUNTER — OFFICE VISIT (OUTPATIENT)
Dept: PAIN MANAGEMENT | Age: 67
End: 2022-11-16
Payer: MEDICARE

## 2022-11-16 VITALS
BODY MASS INDEX: 25.83 KG/M2 | DIASTOLIC BLOOD PRESSURE: 68 MMHG | HEIGHT: 65 IN | TEMPERATURE: 97 F | WEIGHT: 155 LBS | HEART RATE: 61 BPM | RESPIRATION RATE: 16 BRPM | OXYGEN SATURATION: 97 % | SYSTOLIC BLOOD PRESSURE: 115 MMHG

## 2022-11-16 DIAGNOSIS — M53.3 SACROILIAC DYSFUNCTION: Primary | ICD-10-CM

## 2022-11-16 DIAGNOSIS — M47.817 LUMBOSACRAL SPONDYLOSIS WITHOUT MYELOPATHY: ICD-10-CM

## 2022-11-16 DIAGNOSIS — M51.36 DDD (DEGENERATIVE DISC DISEASE), LUMBAR: ICD-10-CM

## 2022-11-16 PROCEDURE — G8399 PT W/DXA RESULTS DOCUMENT: HCPCS | Performed by: ANESTHESIOLOGY

## 2022-11-16 PROCEDURE — 3017F COLORECTAL CA SCREEN DOC REV: CPT | Performed by: ANESTHESIOLOGY

## 2022-11-16 PROCEDURE — G8417 CALC BMI ABV UP PARAM F/U: HCPCS | Performed by: ANESTHESIOLOGY

## 2022-11-16 PROCEDURE — 1090F PRES/ABSN URINE INCON ASSESS: CPT | Performed by: ANESTHESIOLOGY

## 2022-11-16 PROCEDURE — G8484 FLU IMMUNIZE NO ADMIN: HCPCS | Performed by: ANESTHESIOLOGY

## 2022-11-16 PROCEDURE — 99213 OFFICE O/P EST LOW 20 MIN: CPT | Performed by: ANESTHESIOLOGY

## 2022-11-16 PROCEDURE — G8427 DOCREV CUR MEDS BY ELIG CLIN: HCPCS | Performed by: ANESTHESIOLOGY

## 2022-11-16 PROCEDURE — 1036F TOBACCO NON-USER: CPT | Performed by: ANESTHESIOLOGY

## 2022-11-16 PROCEDURE — 3078F DIAST BP <80 MM HG: CPT | Performed by: ANESTHESIOLOGY

## 2022-11-16 PROCEDURE — 1123F ACP DISCUSS/DSCN MKR DOCD: CPT | Performed by: ANESTHESIOLOGY

## 2022-11-16 PROCEDURE — 3074F SYST BP LT 130 MM HG: CPT | Performed by: ANESTHESIOLOGY

## 2022-11-16 RX ORDER — SODIUM CHLORIDE 0.9 % (FLUSH) 0.9 %
5-40 SYRINGE (ML) INJECTION EVERY 12 HOURS SCHEDULED
Status: CANCELLED | OUTPATIENT
Start: 2022-11-16

## 2022-11-16 RX ORDER — SODIUM CHLORIDE 0.9 % (FLUSH) 0.9 %
5-40 SYRINGE (ML) INJECTION PRN
Status: CANCELLED | OUTPATIENT
Start: 2022-11-16

## 2022-11-16 RX ORDER — SODIUM CHLORIDE 9 MG/ML
INJECTION, SOLUTION INTRAVENOUS PRN
Status: CANCELLED | OUTPATIENT
Start: 2022-11-16

## 2022-11-16 NOTE — PROGRESS NOTES
Do you currently have any of the following:    Fever: No  Headache:  No  Cough: No  Shortness of breath: No  Exposed to anyone with these symptoms: No         Nasrin Ordaz presents to the [facility] on 11/16/2022. [facility] is complaining of pain in  right buttock. The pain is constant. The pain is described as aching and miserable. Pain is rated on her best day at a 5, on her worst day at a 10, and on average at a 8 on the VAS scale. Any procedures since your last visit: Yes, with 50 % relief. Pacemaker or defibrillator: No     She is not on NSAIDS and is not on anticoagulation medications. Medication Contract and Consent for Opioid Use Documents Filed        No documents found                    /68   Pulse 61   Temp 97 °F (36.1 °C) (Infrared)   Resp 16   Ht 5' 5\" (1.651 m)   Wt 155 lb (70.3 kg)   SpO2 97%   BMI 25.79 kg/m²      No LMP recorded. Patient has had a hysterectomy.

## 2022-11-16 NOTE — PROGRESS NOTES
[facility] - [facility]AVIORAL HE[facility] Pain Management        [address]  [facility] - BEHAVIORAL HE[facility] SERVICES, [address] [facility]  Dept: [phone]      Follow up Note      Wilmer Burroughs     Date of Visit:  11/16/2022    CC:  Patient presents for follow up   Chief Complaint   Patient presents with    Follow-up     #2 RIGHT SACROILIAC JOINT INJECTION UNDER FLUOROSCOPY            HPI:  Low back pain over the right lower lumbar area and intermittent right LE pain mainly over the right thigh. Pain causes functional limitations/ limits Adl's : Yes     Prior treatment at [facility]- in the past.     Has been evaluated by ortho recently to r/o hip pathology. Has been evaluated buy NSG - had MRI of LS spine and referred for interventions. Notes:  S/P Gastric bypass surgery. On Medical Marijuana. Previous treatments:  Physical Therapy /Chiropractic treatment/ HEP: yes,      Medications: - NSAID's : yes - caution due to h/o gastric bypass                       - Membrane stabilizers : yes - gabapentin                       - Opioids : no                       - Adjuvants or Others : yes,     Spine Surgeries: no     Anticoagulation medications: no.     H/O Smoking: no  H/O alcohol abuse : no  H/O Illicit drug use : denies. Currently Uses medical marijuana     Employment: retired     Imaging:   MRI of [address]: 7/11/2022:  Impression   1. No fracture or bony destructive lesion. 2. Severe central canal stenosis at L3-4. Moderate stenoses at L1-2 and   L2-3. Mild stenoses at L4-5 and L5-S1.   3.  Multilevel neural foraminal stenoses, worst (severe) at the left L3-4,   right L4-5 and bilateral L5-S1 levels. 4. Multilevel stenoses of the lateral recess, worse (severe) at the right   L5-S1 level, resulting in significant impingement of the right S1 nerve. OARRS report[de-identified] reviewed.     Past Medical History:   Diagnosis Date    Arthritis     Chronic back pain     Chronic hip pain     Constipation     Diabetes mellitus ([address]     no longer on meds since wt loss surgery    Hyperlipidemia     Hypertension     no meds since weight loss surgery    Nausea & vomiting     PONV (postoperative nausea and vomiting)     Ringing in ears     Spinal stenosis        Past Surgical History:   Procedure Laterality Date    CARPAL TUNNEL RELEASE Bilateral 2007    Dr. Irwin Dandy  Aug 2014    Laparoscopic    COLONOSCOPY  2007    HYSTERECTOMY (CERVIX STATUS UNKNOWN)  2005,Dr. Velasquez,[facility]    JOINT REPLACEMENT  2007    lt knee     NERVE BLOCK Right 8/22/2022    RIGHT SACROILIAC JOINT INJECTION UNDER FLUOROSCOPY performed by Leo Butler MD at [address] [facility] Right 10/6/2022    #2 RIGHT SACROILIAC JOINT INJECTION UNDER FLUOROSCOPY performed by Leo Butler MD at [address] Right 7/25/2022    LUMBAR TRANSFORAMINAL EPIDURAL STEROID INJECTION RIGHT L3 AND L4 UNDER FLUOROSCOPIC GUIDANCE performed by Leo Butler MD at [address] [facility]  12/08/2014    Laparoscopic    [address] [facility]  left,2007, Dr. Heather Avalos Right 02/14/2011    Right TKA TBailee Khan MD    UPPER GASTROINTESTINAL ENDOSCOPY  7/9/2014    Dr. Smitha Caicedo, [facility], Findings: Mild Gastritis       Prior to Admission medications    Medication Sig Start Date End Date Taking?  Authorizing Provider   Plecanatide (TRULANCE) 3 MG TABS Take 3 mg by mouth daily   Yes Historical Provider, MD   vitamin B-12 (CYANOCOBALAMIN) 500 MCG tablet Take 500 mcg by mouth every other day   Yes Historical Provider, MD   Famotidine-Ca Carb-Mag Hydrox (PEPCID COMPLETE PO) Take 1 tablet by mouth daily as needed    Yes Historical Provider, MD   vitamin E 400 UNIT capsule Take 400 Units by mouth every other day    Yes Historical Provider, MD   melatonin 10 MG CAPS capsule Take 10 mg by mouth nightly as needed    Yes Historical Provider, MD   medical marijuana Take by mouth as needed. Ashely Minaya  / Dana Scott   Yes Historical Provider, MD   atorvastatin (LIPITOR) 40 MG tablet Take 40 mg by mouth daily   Yes Historical Provider, MD   Ferrous Sulfate (IRON PO) Take 1 tablet by mouth every 3 days    Yes Historical Provider, MD   ketotifen (ZADITOR) 0.025 % ophthalmic solution 1 drop 2 times daily   Yes Historical Provider, MD   BLACK CURRANT SEED OIL PO Take by mouth daily   Yes Historical Provider, MD   Multiple Vitamin (MVI, BARIATRIC ADVANTAGE MULTI-FORMULA, CHEW TAB) Take 1 tablet by mouth 2 times daily    Yes Historical Provider, MD   Calcium Citrate-Vitamin D (CA CITR+VIT D,CELEBRATE CALCIUM PLUS 500, TAB) 1 tablet 3 times daily. Yes Historical Provider, MD   Cholecalciferol (VITAMIN D PO) Take 5,000 Units by mouth every other day. Bariatric   Yes Historical Provider, MD       Allergies   Allergen Reactions    Penicillins Hives and Rash    Sulfa Antibiotics Hives and Rash       Social History     Socioeconomic History    Marital status: Single     Spouse name: Not on file    Number of children: Not on file    Years of education: Not on file    Highest education level: Not on file   Occupational History    Not on file   Tobacco Use    Smoking status: Former     Packs/day: 1.50     Years: 4.00     Pack years: 6.00     Types: Cigarettes     Quit date: 1977     Years since quittin.5    Smokeless tobacco: Never   Vaping Use    Vaping Use: Never used   Substance and Sexual Activity    Alcohol use:  Yes     Alcohol/week: 0.8 standard drinks     Types: 1 Standard drinks or equivalent per week     Comment: Occasionally    Drug use: Yes     Types: Marijuana Jenet Olga)     Comment: Medical Marijuana (nightly)    Sexual activity: Not Currently   Other Topics Concern    Not on file   Social History Narrative    Not on file     Social Determinants of Health     Financial Resource Strain: Not on file   Food Insecurity: Not on file   Transportation Needs: Not on file   Physical Activity: Not on file Stress: Not on file   Social Connections: Not on file   Intimate Partner Violence: Not on file   Housing Stability: Not on file       Family History   Problem Relation Age of Onset    High Blood Pressure Mother     Diabetes Mother     Diabetes Father     High Blood Pressure Father     High Blood Pressure Brother     Diabetes Paternal Grandfather        REVIEW OF SYSTEMS:     Gus Villarreal denies fever/chills, chest pain, shortness of breath, new bowel or bladder complaints. All other review of systems was negative. PHYSICAL EXAMINATION:      /68   Pulse 61   Temp 97 °F (36.1 °C) (Infrared)   Resp 16   Ht 5' 5\" (1.651 m)   Wt 155 lb (70.3 kg)   SpO2 97%   BMI 25.79 kg/m²   General:       General appearance:  Pleasant and well-hydrated, in no distress and A & O x 3  Build:Overweight  Function: Rises from seated position easily     HEENT:     Head:normocephalic, atraumatic  Pupils:regular, round, equal  Sclera: icterus absent     Lungs:     Breathing:normal breathing pattern     CVS:     RRR     Abdomen:     Shape:non-distended and normal     Cervical spine:     Inspection:normal     Thoracic spine:                Spine inspection:normal       Lumbar spine:     Spine inspection: Normal  Palpation: Tenderness paravertebral muscles No bilaterally  Range of motion: Decreased, flexion Decreased, Lateral bending, extension and rotation bilaterally reduced.   Sacroiliac joint tenderness + right  JANET test:+ right   Gaenslen's test:+ right  Piriformis tenderness: negative bilaterally  SLR : negative bilaterally     Musculoskeletal:     Trigger points No      Extremities:     Tremors:None bilaterally upper and lower  Edema:no     Neurological:     Sensory: Normal to light touch     Motor:  Right  5/5              Left  5/5               Right Bicep 5/5           Left Bicep 5/5              Right Triceps 5/5       Left Triceps 5/5          Right Deltoid 5/5     Left Deltoid 5/5                  Right

## 2022-11-16 NOTE — H&P (VIEW-ONLY)
[facility]        [address]  [place], [address] [facility]  Dept: [phone]      Follow up Note      Purvis Libman     Date of Visit:  11/16/2022    CC:  Patient presents for follow up   Chief Complaint   Patient presents with    Follow-up     #2 RIGHT SACROILIAC JOINT INJECTION UNDER FLUOROSCOPY            HPI:  Low back pain over the right lower lumbar area and intermittent right LE pain mainly over the right thigh. Pain causes functional limitations/ limits Adl's : Yes     Prior treatment at [facility]- in the past.     Has been evaluated by ortho recently to r/o hip pathology. Has been evaluated buy NSG - had MRI of LS spine and referred for interventions. Notes:  S/P Gastric bypass surgery. On Medical Marijuana. Previous treatments:  Physical Therapy /Chiropractic treatment/ HEP: yes,      Medications: - NSAID's : yes - caution due to h/o gastric bypass                       - Membrane stabilizers : yes - gabapentin                       - Opioids : no                       - Adjuvants or Others : yes,     Spine Surgeries: no     Anticoagulation medications: no.     H/O Smoking: no  H/O alcohol abuse : no  H/O Illicit drug use : denies. Currently Uses medical marijuana     Employment: retired     Imaging:   MRI of [address]: 7/11/2022:  Impression   1. No fracture or bony destructive lesion. 2. Severe central canal stenosis at L3-4. Moderate stenoses at L1-2 and   L2-3. Mild stenoses at L4-5 and L5-S1.   3.  Multilevel neural foraminal stenoses, worst (severe) at the left L3-4,   right L4-5 and bilateral L5-S1 levels. 4. Multilevel stenoses of the lateral recess, worse (severe) at the right   L5-S1 level, resulting in significant impingement of the right S1 nerve. OARRS report[de-identified] reviewed.     Past Medical History:   Diagnosis Date    Arthritis     Chronic back pain     Chronic hip pain     Constipation     Diabetes mellitus ([address]     no longer on meds since wt loss surgery    Hyperlipidemia     Hypertension     no meds since weight loss surgery    Nausea & vomiting     PONV (postoperative nausea and vomiting)     Ringing in ears     Spinal stenosis        Past Surgical History:   Procedure Laterality Date    CARPAL TUNNEL RELEASE Bilateral 2007    Dr. Milagros Maldonado  Aug 2014    Laparoscopic    COLONOSCOPY  2007    HYSTERECTOMY (CERVIX STATUS UNKNOWN)  2005,Dr. Velasquez,[facility]    JOINT REPLACEMENT  2007    lt knee     NERVE BLOCK Right 8/22/2022    RIGHT SACROILIAC JOINT INJECTION UNDER FLUOROSCOPY performed by Lacho James MD at [address] Right 10/6/2022    #2 RIGHT SACROILIAC JOINT INJECTION UNDER FLUOROSCOPY performed by Lacho James MD at [address] Right 7/25/2022    LUMBAR TRANSFORAMINAL EPIDURAL STEROID INJECTION RIGHT L3 AND L4 UNDER FLUOROSCOPIC GUIDANCE performed by Lacho James MD at [address] [facility]  12/08/2014    Laparoscopic    [address] [facility]  left,2007, Dr. Jacki Kent Right 02/14/2011    Right TKA JOSEPH Bennett MD    UPPER GASTROINTESTINAL ENDOSCOPY  7/9/2014    Dr. Marilyn Tran, [facility], Findings: Mild Gastritis       Prior to Admission medications    Medication Sig Start Date End Date Taking?  Authorizing Provider   Plecanatide (TRULANCE) 3 MG TABS Take 3 mg by mouth daily   Yes Historical Provider, MD   vitamin B-12 (CYANOCOBALAMIN) 500 MCG tablet Take 500 mcg by mouth every other day   Yes Historical Provider, MD   Famotidine-Ca Carb-Mag Hydrox (PEPCID COMPLETE PO) Take 1 tablet by mouth daily as needed    Yes Historical Provider, MD   vitamin E 400 UNIT capsule Take 400 Units by mouth every other day    Yes Historical Provider, MD   melatonin 10 MG CAPS capsule Take 10 mg by mouth nightly as needed    Yes Historical Provider, MD   medical marijuana Take by mouth as needed. Jenny Sal  / Luly Living   Yes Historical Provider, MD   atorvastatin (LIPITOR) 40 MG tablet Take 40 mg by mouth daily   Yes Historical Provider, MD   Ferrous Sulfate (IRON PO) Take 1 tablet by mouth every 3 days    Yes Historical Provider, MD   ketotifen (ZADITOR) 0.025 % ophthalmic solution 1 drop 2 times daily   Yes Historical Provider, MD   BLACK CURRANT SEED OIL PO Take by mouth daily   Yes Historical Provider, MD   Multiple Vitamin (MVI, BARIATRIC ADVANTAGE MULTI-FORMULA, CHEW TAB) Take 1 tablet by mouth 2 times daily    Yes Historical Provider, MD   Calcium Citrate-Vitamin D (CA CITR+VIT D,CELEBRATE CALCIUM PLUS 500, TAB) 1 tablet 3 times daily. Yes Historical Provider, MD   Cholecalciferol (VITAMIN D PO) Take 5,000 Units by mouth every other day. Bariatric   Yes Historical Provider, MD       Allergies   Allergen Reactions    Penicillins Hives and Rash    Sulfa Antibiotics Hives and Rash       Social History     Socioeconomic History    Marital status: Single     Spouse name: Not on file    Number of children: Not on file    Years of education: Not on file    Highest education level: Not on file   Occupational History    Not on file   Tobacco Use    Smoking status: Former     Packs/day: 1.50     Years: 4.00     Pack years: 6.00     Types: Cigarettes     Quit date: 1977     Years since quittin.5    Smokeless tobacco: Never   Vaping Use    Vaping Use: Never used   Substance and Sexual Activity    Alcohol use:  Yes     Alcohol/week: 0.8 standard drinks     Types: 1 Standard drinks or equivalent per week     Comment: Occasionally    Drug use: Yes     Types: Marijuana Shellye Burkitt)     Comment: Medical Marijuana (nightly)    Sexual activity: Not Currently   Other Topics Concern    Not on file   Social History Narrative    Not on file     Social Determinants of Health     Financial Resource Strain: Not on file   Food Insecurity: Not on file   Transportation Needs: Not on file   Physical Activity: Not on file Stress: Not on file   Social Connections: Not on file   Intimate Partner Violence: Not on file   Housing Stability: Not on file       Family History   Problem Relation Age of Onset    High Blood Pressure Mother     Diabetes Mother     Diabetes Father     High Blood Pressure Father     High Blood Pressure Brother     Diabetes Paternal Grandfather        REVIEW OF SYSTEMS:     Carlos Webb denies fever/chills, chest pain, shortness of breath, new bowel or bladder complaints. All other review of systems was negative. PHYSICAL EXAMINATION:      /68   Pulse 61   Temp 97 °F (36.1 °C) (Infrared)   Resp 16   Ht 5' 5\" (1.651 m)   Wt 155 lb (70.3 kg)   SpO2 97%   BMI 25.79 kg/m²   General:       General appearance:  Pleasant and well-hydrated, in no distress and A & O x 3  Build:Overweight  Function: Rises from seated position easily     HEENT:     Head:normocephalic, atraumatic  Pupils:regular, round, equal  Sclera: icterus absent     Lungs:     Breathing:normal breathing pattern     CVS:     RRR     Abdomen:     Shape:non-distended and normal     Cervical spine:     Inspection:normal     Thoracic spine:                Spine inspection:normal       Lumbar spine:     Spine inspection: Normal  Palpation: Tenderness paravertebral muscles No bilaterally  Range of motion: Decreased, flexion Decreased, Lateral bending, extension and rotation bilaterally reduced.   Sacroiliac joint tenderness + right  JANET test:+ right   Gaenslen's test:+ right  Piriformis tenderness: negative bilaterally  SLR : negative bilaterally     Musculoskeletal:     Trigger points No      Extremities:     Tremors:None bilaterally upper and lower  Edema:no     Neurological:     Sensory: Normal to light touch     Motor:  Right  5/5              Left  5/5               Right Bicep 5/5           Left Bicep 5/5              Right Triceps 5/5       Left Triceps 5/5          Right Deltoid 5/5     Left Deltoid 5/5                  Right Quadriceps 5/5          Left Quadriceps 5/5           Right Gastrocnemius 5/5    Left Gastrocnemius 5/5  Right Ant Tibialis 5/5  Left Ant Tibialis 5/5     Dermatology:     Skin:no rashes or lesions noted     Assessment/Plan:       Diagnosis Orders   1. Spinal stenosis of lumbar region, unspecified whether neurogenic claudication present         2. DDD (degenerative disc disease), lumbar         3. Lumbar radicular pain         4. Lumbosacral spondylosis without myelopathy         5. S/P gastric bypass              74 y.o. female with h/o low back pain and right LE pain. Failed conservative treatment     MRI LS spine : multi level DDD/ stenosis/ foraminal narrowing. Has been evaluated by NSG- referred for interventions     S/P Gastric bypass     On Medical Marijuana. On gabapentin. S/P Lumbar TFESI right L3 & L4 good relief of LE pain. Continues to have significant pain over the right SIJ. Features of Right SIJ pain. Doing PT/ HEP. S/p X 2 Right SIJ injection with > 80% relief for few weeks with excellent pain relief and improved functionality- could walk for longer distance. Now pain has recurred and notices only partial relief. Doing HEP. Right SIJ tenderness +     PLAN:     Radiofrequency ablation of Right S1, S2, S3 lateral branch & L5 DR under fluoroscopy. RBA discussed - patient agreed. Consider [place] JOSE in future if LE pain bothers much. Physical therapy. If interventions does not help, consider NSG re-eval.    NOTE: ON medical marijuana gummies. Counseling : Patient encouraged to stay active and to continue Regular home exercise program as tolerated - stretching / strengthening. Treatment plan discussed with the patient including medication and procedure side effects. Controlled Substances Monitoring:   OARRS reviewed- medical marijuana.       Zachery Syed MD

## 2022-11-16 NOTE — PROGRESS NOTES
[facility]  INSTRUCTIONS  . .......................................................................................................................................... [x] Parking the day of Surgery is located in the [facility].   Upon entering the door, make immediate right into the surgery reception room    [x]  Bring photo ID and insurance card     [x] You may have a light breakfast day of procedure    [x]  Wear loose comfortable clothing    [x]  Please follow instructions for medications as given per Dr's office    [x] You can expect a call the business day prior to procedure to notify you of your arrival time     [x] Please arrange for     []  Other instructions

## 2022-11-17 ENCOUNTER — HOSPITAL ENCOUNTER (OUTPATIENT)
Age: 67
Setting detail: OUTPATIENT SURGERY
Discharge: HOME | End: 2022-11-17
Attending: ANESTHESIOLOGY | Admitting: ANESTHESIOLOGY
Payer: MEDICARE

## 2022-11-17 ENCOUNTER — HOSPITAL ENCOUNTER (OUTPATIENT)
Dept: GENERAL RADIOLOGY | Age: 67
Discharge: HOME | End: 2022-11-19
Attending: ANESTHESIOLOGY
Payer: MEDICARE

## 2022-11-17 VITALS
BODY MASS INDEX: 25.83 KG/M2 | DIASTOLIC BLOOD PRESSURE: 75 MMHG | HEIGHT: 65 IN | WEIGHT: 155 LBS | SYSTOLIC BLOOD PRESSURE: 150 MMHG | TEMPERATURE: 97.8 F | OXYGEN SATURATION: 98 % | RESPIRATION RATE: 14 BRPM | HEART RATE: 55 BPM

## 2022-11-17 DIAGNOSIS — R52 PAIN MANAGEMENT: ICD-10-CM

## 2022-11-17 LAB — METER GLUCOSE: 186 MG/DL (ref 74–99)

## 2022-11-17 PROCEDURE — 7100000010 HC PHASE II RECOVERY - FIRST 15 MIN: Performed by: ANESTHESIOLOGY

## 2022-11-17 PROCEDURE — 82962 GLUCOSE BLOOD TEST: CPT

## 2022-11-17 PROCEDURE — 7100000011 HC PHASE II RECOVERY - ADDTL 15 MIN: Performed by: ANESTHESIOLOGY

## 2022-11-17 PROCEDURE — 2500000003 HC RX 250 WO HCPCS: Performed by: ANESTHESIOLOGY

## 2022-11-17 PROCEDURE — 3600000002 HC SURGERY LEVEL 2 BASE: Performed by: ANESTHESIOLOGY

## 2022-11-17 PROCEDURE — 2709999900 HC NON-CHARGEABLE SUPPLY: Performed by: ANESTHESIOLOGY

## 2022-11-17 PROCEDURE — 3209999900 FLUORO FOR SURGICAL PROCEDURES

## 2022-11-17 PROCEDURE — 6360000002 HC RX W HCPCS: Performed by: ANESTHESIOLOGY

## 2022-11-17 PROCEDURE — 64625 RF ABLTJ NRV NRVTG SI JT: CPT | Performed by: ANESTHESIOLOGY

## 2022-11-17 PROCEDURE — 3600000012 HC SURGERY LEVEL 2 ADDTL 15MIN: Performed by: ANESTHESIOLOGY

## 2022-11-17 RX ORDER — SODIUM CHLORIDE 0.9 % (FLUSH) 0.9 %
5-40 SYRINGE (ML) INJECTION PRN
Status: DISCONTINUED | OUTPATIENT
Start: 2022-11-17 | End: 2022-11-17 | Stop reason: HOSPADM

## 2022-11-17 RX ORDER — SODIUM CHLORIDE 9 MG/ML
INJECTION, SOLUTION INTRAVENOUS PRN
Status: DISCONTINUED | OUTPATIENT
Start: 2022-11-17 | End: 2022-11-17 | Stop reason: HOSPADM

## 2022-11-17 RX ORDER — LIDOCAINE HYDROCHLORIDE 10 MG/ML
INJECTION, SOLUTION EPIDURAL; INFILTRATION; INTRACAUDAL; PERINEURAL PRN
Status: DISCONTINUED | OUTPATIENT
Start: 2022-11-17 | End: 2022-11-17 | Stop reason: ALTCHOICE

## 2022-11-17 RX ORDER — LIDOCAINE HYDROCHLORIDE 5 MG/ML
INJECTION, SOLUTION INFILTRATION; INTRAVENOUS PRN
Status: DISCONTINUED | OUTPATIENT
Start: 2022-11-17 | End: 2022-11-17 | Stop reason: ALTCHOICE

## 2022-11-17 RX ORDER — BUPIVACAINE HYDROCHLORIDE 2.5 MG/ML
INJECTION, SOLUTION EPIDURAL; INFILTRATION; INTRACAUDAL PRN
Status: DISCONTINUED | OUTPATIENT
Start: 2022-11-17 | End: 2022-11-17 | Stop reason: ALTCHOICE

## 2022-11-17 RX ORDER — SODIUM CHLORIDE 0.9 % (FLUSH) 0.9 %
5-40 SYRINGE (ML) INJECTION EVERY 12 HOURS SCHEDULED
Status: DISCONTINUED | OUTPATIENT
Start: 2022-11-17 | End: 2022-11-17 | Stop reason: HOSPADM

## 2022-11-17 RX ORDER — METHYLPREDNISOLONE ACETATE 40 MG/ML
INJECTION, SUSPENSION INTRA-ARTICULAR; INTRALESIONAL; INTRAMUSCULAR; SOFT TISSUE PRN
Status: DISCONTINUED | OUTPATIENT
Start: 2022-11-17 | End: 2022-11-17 | Stop reason: ALTCHOICE

## 2022-11-17 ASSESSMENT — PAIN DESCRIPTION - LOCATION: LOCATION: BACK

## 2022-11-17 ASSESSMENT — PAIN DESCRIPTION - DESCRIPTORS
DESCRIPTORS: BURNING
DESCRIPTORS: ACHING;DULL

## 2022-11-17 ASSESSMENT — PAIN DESCRIPTION - FREQUENCY: FREQUENCY: CONTINUOUS

## 2022-11-17 ASSESSMENT — PAIN - FUNCTIONAL ASSESSMENT: PAIN_FUNCTIONAL_ASSESSMENT: 0-10

## 2022-11-17 ASSESSMENT — PAIN DESCRIPTION - ORIENTATION: ORIENTATION: RIGHT

## 2022-11-17 ASSESSMENT — PAIN SCALES - GENERAL: PAINLEVEL_OUTOF10: 2

## 2022-11-17 ASSESSMENT — PAIN DESCRIPTION - PAIN TYPE: TYPE: SURGICAL PAIN

## 2022-11-17 NOTE — DISCHARGE INSTRUCTIONS
[facility] Block/Radiofrequency  Home Going Instructions    1-Go home, rest for the remainder of the day  2-Please do not lift over 20 pounds the day of the injection  3-If you received sedation No: alcohol, driving, operating lawn mowers, plows, tractors or other dangerous equipment until next morning. Do not make important decisions or sign legal documents for 24 hours. You may experience light headedness, dizziness, nausea or sleepiness after sedation. Do not stay alone. A responsible adult must be with you for 24 hours. You could be nauseated from the medications you have received. Your IV site may be sore and bruised. 4-No dietary restrictions     5-Resume all medications the same day, blood thinners to be resumed 24 hours after injection if you were instructed to stop any. 6-Keep the surgical site clean and dry, you may shower the next morning and remove the      dressing. 7- No sitz baths, tub baths or hot tubs/swimming for 24 hours. 8- If you have any pain at the injection site(s), application of an ice pack to the area should be       helpful, 20 minutes on/20 minutes off for next 48 hours. 9- Call [facility] immediately at if you develop.   Fever greater than 100.4 F  Have bleeding or drainage from the puncture site  Have progressive Leg/arm numbness and or weakness  Loss of control of bowel and or bladder (wet/soil yourself)  Severe headache with inability to lift head  10-You may return to work the next day

## 2022-11-17 NOTE — OP NOTE
Operative Note      Patient: Mary Burger  YOB: 1955  MRN: [id number]    Date of Procedure: 2022    Pre-Op Diagnosis: Sacroiliac dysfunction, Lumbosacral spondylosis [M47.817]    Post-Op Diagnosis: Same       Procedure(s):  RIGHT S1,S2,S3 LATERAL BRANCH AND L5 DR RADIOFREQUENCY ABLATION UNDER FLUOROSCOPIC GUIDANCE    Surgeon(s):  Alejandrina Landaverde MD    Assistant:   * No surgical staff found *    Anesthesia: Local    Estimated Blood Loss (mL): Minimal    Complications: None    Specimens:   * No specimens in log *    Implants:  * No implants in log *      Drains: * No LDAs found *    Findings: good needle placement    Detailed Description of Procedure:   2022    Patient: Mary Burger  :  1955  Age:  74 y.o. Sex:  female     PRE-OPERATIVE DIAGNOSIS:   Sacroiliac dysfunction , Lumbosacral spondylosis    POST-OPERATIVE DIAGNOSIS: Same. PROCEDURE: Fluoroscopic guided Right  S1, S2, S3 LATERAL BRANCH & L5DR radiofrequency ablation. SURGEON: Alejandrina Landaverde MD    ANESTHESIA: Local    ESTIMATED BLOOD LOSS: None.  ______________________________________________________________________  BRIEF HISTORY:  Mary Burger comes in today for the baove procedure. The potential complications as well as the procedure in detail were explained to her today. She has elected to undergo the aforementioned procedure. Arminda's complete History & Physical examination were reviewed in depth, a copy of which is in the chart. DESCRIPTION OF PROCEDURE:    After confirming written and informed consent, a time-out was performed and Kerri name and date of birth, the procedure to be performed as well as the plan for the location of the needle insertion were confirmed. The patient was brought into the procedure room and placed in the prone position on the fluoroscopy table. Standard monitors were placed and vital signs were observed throughout the procedure.  The low back and upper buttocks area was prepped with chloraprep and draped in a sterile manner. AP fluoroscopy was used to visualize the S1, S2,S3 foramina on the targeted site. The skin and subcutaneous tissue about this identified points > 1 cm lateral to the foramina, were anesthestized with 0.5% lidocaine. A 22 gauge 10 mm radiofrequency probe was advanced toward each of these points a total of 5 sites 1 cm apart under fluoroscopic guidance as well as L5DR on targeted site. Once the targeted point was reached negative aspiration was confirmed, 4 ml of 1% lidocaine was injected prior to bipolar lesioning and after motor testing. A bipolar lesion was performed for 90 seconds at 90 degrees centigrade at each of the levels followed by monopolar ablation of L5. At that point, a solution consisiting of 0.25% marcaine 3 cc and 40 mg DepoMedrol was easily injected. All needles were then removed and the needles insertion sites were covered with Band-Aid. Disposition the patient tolerated the procedure well and there were no complications . Vital signs remained stable throughout the procedure. The patient was escorted to the recovery area where they remained until discharge and written discharge instructions for the procedure were given. Plan: Griselda Juarez will return to our pain management center as scheduled.      Wil Lepe MD

## 2022-11-17 NOTE — INTERVAL H&P NOTE
Update History & Physical    The patient's History and Physical of November 16, 2022 was reviewed with the patient and I examined the patient. There was no change. The surgical site was confirmed by the patient and me. Plan: The risks, benefits, expected outcome, and alternative to the recommended procedure have been discussed with the patient. Patient understands and wants to proceed with the procedure.      Electronically signed by Rudy Alva MD on 11/17/2022

## 2022-12-22 ENCOUNTER — OFFICE VISIT (OUTPATIENT)
Dept: PAIN MANAGEMENT | Age: 67
End: 2022-12-22
Payer: MEDICARE

## 2022-12-22 VITALS
WEIGHT: 154 LBS | DIASTOLIC BLOOD PRESSURE: 72 MMHG | SYSTOLIC BLOOD PRESSURE: 160 MMHG | RESPIRATION RATE: 16 BRPM | OXYGEN SATURATION: 98 % | TEMPERATURE: 97.3 F | BODY MASS INDEX: 25.66 KG/M2 | HEART RATE: 70 BPM | HEIGHT: 65 IN

## 2022-12-22 DIAGNOSIS — M48.061 SPINAL STENOSIS OF LUMBAR REGION, UNSPECIFIED WHETHER NEUROGENIC CLAUDICATION PRESENT: Primary | ICD-10-CM

## 2022-12-22 DIAGNOSIS — M53.3 SACROILIAC DYSFUNCTION: ICD-10-CM

## 2022-12-22 DIAGNOSIS — M47.817 LUMBOSACRAL SPONDYLOSIS WITHOUT MYELOPATHY: ICD-10-CM

## 2022-12-22 DIAGNOSIS — M51.36 DDD (DEGENERATIVE DISC DISEASE), LUMBAR: ICD-10-CM

## 2022-12-22 PROCEDURE — G8427 DOCREV CUR MEDS BY ELIG CLIN: HCPCS | Performed by: ANESTHESIOLOGY

## 2022-12-22 PROCEDURE — 1123F ACP DISCUSS/DSCN MKR DOCD: CPT | Performed by: ANESTHESIOLOGY

## 2022-12-22 PROCEDURE — G8399 PT W/DXA RESULTS DOCUMENT: HCPCS | Performed by: ANESTHESIOLOGY

## 2022-12-22 PROCEDURE — 1090F PRES/ABSN URINE INCON ASSESS: CPT | Performed by: ANESTHESIOLOGY

## 2022-12-22 PROCEDURE — 3078F DIAST BP <80 MM HG: CPT | Performed by: ANESTHESIOLOGY

## 2022-12-22 PROCEDURE — 3074F SYST BP LT 130 MM HG: CPT | Performed by: ANESTHESIOLOGY

## 2022-12-22 PROCEDURE — 1036F TOBACCO NON-USER: CPT | Performed by: ANESTHESIOLOGY

## 2022-12-22 PROCEDURE — 3017F COLORECTAL CA SCREEN DOC REV: CPT | Performed by: ANESTHESIOLOGY

## 2022-12-22 PROCEDURE — 99213 OFFICE O/P EST LOW 20 MIN: CPT | Performed by: ANESTHESIOLOGY

## 2022-12-22 PROCEDURE — G8417 CALC BMI ABV UP PARAM F/U: HCPCS | Performed by: ANESTHESIOLOGY

## 2022-12-22 PROCEDURE — G8484 FLU IMMUNIZE NO ADMIN: HCPCS | Performed by: ANESTHESIOLOGY

## 2022-12-22 NOTE — PROGRESS NOTES
[facility] - BEHAVIORAL HEALTH SERVICES Pain Management        [address]  [facility] - BE[facility] SERVICES, [address] [facility]  Dept: [phone]      Follow up Note      Wilmer Burroughs     Date of Visit:  12/22/2022    CC:  Patient presents for follow up   Chief Complaint   Patient presents with    Follow-up     RIGHT S1,S2,S3 LATERAL BRANCH AND L5 DR RADIOFREQUENCY ABLATION UNDER FLUOROSCOPIC GUIDANCE       HPI:  Low back pain over the right lower lumbar area and intermittent right LE pain mainly over the right thigh. Pain causes functional limitations/ limits Adl's : Yes     Prior treatment at [facility]- in the past.     Has been evaluated by ortho recently to r/o hip pathology. Has been evaluated buy NS - recommended interventions. Notes:  S/P Gastric bypass surgery. On Medical Marijuana. Previous treatments:  Physical Therapy /Chiropractic treatment/ HEP: yes,      Medications: - NSAID's : yes - caution due to h/o gastric bypass                       - Membrane stabilizers : yes - gabapentin                       - Opioids : no                       - Adjuvants or Others : yes,     Spine Surgeries: no     Anticoagulation medications: no.     H/O Smoking: no  H/O alcohol abuse : no  H/O Illicit drug use : denies. Currently Uses medical marijuana     Employment: retired     Imaging:   MRI of LS spine: 7/11/2022:  Impression   1. No fracture or bony destructive lesion. 2. Severe central canal stenosis at L3-4. Moderate stenoses at L1-2 and   L2-3. Mild stenoses at L4-5 and L5-S1.   3.  Multilevel neural foraminal stenoses, worst (severe) at the left L3-4,   right L4-5 and bilateral L5-S1 levels. 4. Multilevel stenoses of the lateral recess, worse (severe) at the right   L5-S1 level, resulting in significant impingement of the right S1 nerve. OARRS report[de-identified] reviewed.     Past Medical History:   Diagnosis Date    Arthritis     Chronic back pain     Chronic hip pain     Constipation     Diabetes mellitus ([address]     no longer on meds since wt loss surgery    Hyperlipidemia     Hypertension     no meds since weight loss surgery    Nausea & vomiting     PONV (postoperative nausea and vomiting)     Ringing in ears     Spinal stenosis        Past Surgical History:   Procedure Laterality Date    CARPAL TUNNEL RELEASE Bilateral 2007    Dr. Merari Spring  08/2014    Laparoscopic    COLONOSCOPY  2007    HYSTERECTOMY (CERVIX STATUS UNKNOWN)  2005,Dr. Velasquez,[place]    NERVE BLOCK Right 08/22/2022    RIGHT SACROILIAC JOINT INJECTION UNDER FLUOROSCOPY performed by Porsche Bartholomew MD at [address] [facility] [address] Right 10/06/2022    #2 RIGHT SACROILIAC JOINT INJECTION UNDER FLUOROSCOPY performed by Porsche Bartholomew MD at [address] [facility] Right 07/25/2022    LUMBAR TRANSFORAMINAL EPIDURAL STEROID INJECTION RIGHT L3 AND L4 UNDER FLUOROSCOPIC GUIDANCE performed by Porsche Bartholomew MD at [address] [facility] Right 11/17/2022    RIGHT S1,S2,S3 LATERAL BRANCH AND L5 DR RADIOFREQUENCY ABLATION UNDER FLUOROSCOPIC GUIDANCE performed by Porsche Bartholomew MD at [address] [facility]  12/08/2014    Laparoscopic    TONSILLECTOMY  1962    TOTAL KNEE ARTHROPLASTY Left left,2007, Dr. Marychuy Florez Right 02/14/2011    Right TKA JOSEPH Rosales MD    UPPER GASTROINTESTINAL ENDOSCOPY  07/09/2014    Dr. Deanna Velarde, [facility], Findings: Mild Gastritis       Prior to Admission medications    Medication Sig Start Date End Date Taking?  Authorizing Provider   Plecanatide (TRULANCE) 3 MG TABS Take 3 mg by mouth daily    Historical Provider, MD   vitamin B-12 (CYANOCOBALAMIN) 500 MCG tablet Take 500 mcg by mouth every other day    Historical Provider, MD   Famotidine-Ca Carb-Mag Hydrox (PEPCID COMPLETE PO) Take 1 tablet by mouth daily as needed     Historical Provider, MD   vitamin E 400 UNIT capsule Take 400 Units by mouth every other day Historical Provider, MD   melatonin 10 MG CAPS capsule Take 10 mg by mouth nightly as needed     Historical Provider, MD   medical marijuana Take by mouth as needed. Lynncarolina Diaz  / Antonia Sons    Historical Provider, MD   atorvastatin (LIPITOR) 40 MG tablet Take 40 mg by mouth daily    Historical Provider, MD   Ferrous Sulfate (IRON PO) Take 1 tablet by mouth every 3 days     Historical Provider, MD   ketotifen (ZADITOR) 0.025 % ophthalmic solution Place 1 drop into both eyes daily    Historical Provider, MD   BLACK CURRANT SEED OIL PO Take by mouth daily    Historical Provider, MD   Multiple Vitamin (MVI, BARIATRIC ADVANTAGE MULTI-FORMULA, CHEW TAB) Take 1 tablet by mouth 2 times daily     Historical Provider, MD   Calcium Citrate-Vitamin D (CA CITR+VIT D,CELEBRATE CALCIUM PLUS 500, TAB) 1 tablet 3 times daily. Historical Provider, MD   Cholecalciferol (VITAMIN D PO) Take 5,000 Units by mouth every other day. Bariatric    Historical Provider, MD       Allergies   Allergen Reactions    Penicillins Hives and Rash    Sulfa Antibiotics Hives and Rash       Social History     Socioeconomic History    Marital status: Single     Spouse name: Not on file    Number of children: Not on file    Years of education: Not on file    Highest education level: Not on file   Occupational History    Not on file   Tobacco Use    Smoking status: Former     Packs/day: 1.50     Years: 4.00     Pack years: 6.00     Types: Cigarettes     Quit date: 1977     Years since quittin.6    Smokeless tobacco: Never   Vaping Use    Vaping Use: Never used   Substance and Sexual Activity    Alcohol use:  Yes     Alcohol/week: 0.8 standard drinks     Types: 1 Standard drinks or equivalent per week     Comment: Occasionally    Drug use: Yes     Types: Marijuana Isabela Cotton)     Comment: Medical Marijuana (nightly)    Sexual activity: Not Currently   Other Topics Concern    Not on file   Social History Narrative    Not on file     Social Determinants of Health     Financial Resource Strain: Not on file   Food Insecurity: Not on file   Transportation Needs: Not on file   Physical Activity: Not on file   Stress: Not on file   Social Connections: Not on file   Intimate Partner Violence: Not on file   Housing Stability: Not on file       Family History   Problem Relation Age of Onset    High Blood Pressure Mother     Diabetes Mother     Diabetes Father     High Blood Pressure Father     High Blood Pressure Brother     Diabetes Paternal Grandfather        REVIEW OF SYSTEMS:     Shana Dove denies fever/chills, chest pain, shortness of breath, new bowel or bladder complaints. All other review of systems was negative. PHYSICAL EXAMINATION:      BP (!) 160/72   Pulse 70   Temp 97.3 °F (36.3 °C) (Infrared)   Resp 16   Ht 5' 5\" (1.651 m)   Wt 154 lb (69.9 kg)   SpO2 98%   BMI 25.63 kg/m²   General:       General appearance:  Pleasant and well-hydrated, in no distress and A & O x 3  Build:Overweight  Function: Rises from seated position easily     HEENT:     Head:normocephalic, atraumatic    Lungs:     Breathing:normal breathing pattern     CVS:     RRR     Abdomen:     Shape:non-distended and normal     Cervical spine:     Inspection:normal     Thoracic spine:                Spine inspection:normal     Lumbar spine:     Spine inspection: Normal  Palpation: Tenderness paravertebral muscles No bilaterally  Range of motion: Decreased, flexion Decreased, Lateral bending, extension and rotation bilaterally reduced. Sacroiliac joint tenderness right -improved  Piriformis tenderness: negative bilaterally  SLR : negative bilaterally     Musculoskeletal:     Trigger points No      Extremities:     Tremors:None bilaterally upper and lower  Edema:no     Neurological:     Sensory: Normal to light touch     Motor: NO new changes     Dermatology:     Skin:no rashes or lesions noted     Assessment/Plan:       Diagnosis Orders   1.  Spinal stenosis of lumbar region, unspecified whether neurogenic claudication present         2. DDD (degenerative disc disease), lumbar         3. Lumbar radicular pain         4. Lumbosacral spondylosis without myelopathy         5. S/P gastric bypass              74 y.o. female with h/o low back pain and right LE pain. Failed conservative treatment     MRI LS spine : multi level DDD/ stenosis/ foraminal narrowing. Has been evaluated by NSG- referred for interventions     S/P Gastric bypass     On Medical Marijuana. On gabapentin. S/P Lumbar TFESI right L3 & L4 good relief of LE pain. Had Right SIJ pain. S/P Radiofrequency ablation of Right S1, S2, S3 lateral branch & L5 DR with > 60-70% relief. Over al she is doing well at this point of time. Consider [place] JOSE in future if LE pain bothers much. Physical therapy. NOTE: ON medical marijuana gummies. F/u in 4-5 months. Counseling : Patient encouraged to stay active and to continue Regular home exercise program as tolerated - stretching / strengthening. Treatment plan discussed with the patient including medication and procedure side effects. Controlled Substances Monitoring:   OARRS reviewed- medical marijuana.       Gisela Shearer MD    CC:  Bhanu Huerta MD

## 2022-12-22 NOTE — PROGRESS NOTES
Do you currently have any of the following:    Fever: No  Headache:  No  Cough: No  Shortness of breath: No  Exposed to anyone with these symptoms: No         Adrienne Schroeder presents to the [facility] on 12/22/2022. Jackson Mendosa is complaining of pain in her back. The pain is constant. The pain is described as aching and miserable. Pain is rated on her best day at a 2, on her worst day at a 10, and on average at a 3 on the VAS scale. Any procedures since your last visit: Yes, with 60 % relief. Pacemaker or defibrillator: No  She is not on NSAIDS and is not on anticoagulation medications. Medication Contract and Consent for Opioid Use Documents Filed        No documents found                    BP (!) 160/72   Pulse 70   Temp 97.3 °F (36.3 °C) (Infrared)   Resp 16   Ht 5' 5\" (1.651 m)   Wt 154 lb (69.9 kg)   SpO2 98%   BMI 25.63 kg/m²      No LMP recorded. Patient has had a hysterectomy.

## 2023-04-14 ENCOUNTER — OFFICE VISIT (OUTPATIENT)
Dept: PAIN MANAGEMENT | Age: 68
End: 2023-04-14
Payer: MEDICARE

## 2023-04-14 VITALS
SYSTOLIC BLOOD PRESSURE: 110 MMHG | OXYGEN SATURATION: 98 % | TEMPERATURE: 97.5 F | HEIGHT: 65 IN | BODY MASS INDEX: 25.66 KG/M2 | RESPIRATION RATE: 18 BRPM | DIASTOLIC BLOOD PRESSURE: 70 MMHG | HEART RATE: 61 BPM | WEIGHT: 154 LBS

## 2023-04-14 DIAGNOSIS — M51.36 DDD (DEGENERATIVE DISC DISEASE), LUMBAR: ICD-10-CM

## 2023-04-14 DIAGNOSIS — Z98.84 GASTRIC BYPASS STATUS FOR OBESITY: ICD-10-CM

## 2023-04-14 DIAGNOSIS — M47.817 LUMBOSACRAL SPONDYLOSIS WITHOUT MYELOPATHY: ICD-10-CM

## 2023-04-14 DIAGNOSIS — M53.3 SACROILIAC DYSFUNCTION: Primary | ICD-10-CM

## 2023-04-14 DIAGNOSIS — M48.061 SPINAL STENOSIS OF LUMBAR REGION, UNSPECIFIED WHETHER NEUROGENIC CLAUDICATION PRESENT: ICD-10-CM

## 2023-04-14 PROCEDURE — 1123F ACP DISCUSS/DSCN MKR DOCD: CPT | Performed by: ANESTHESIOLOGY

## 2023-04-14 PROCEDURE — G8399 PT W/DXA RESULTS DOCUMENT: HCPCS | Performed by: ANESTHESIOLOGY

## 2023-04-14 PROCEDURE — G8427 DOCREV CUR MEDS BY ELIG CLIN: HCPCS | Performed by: ANESTHESIOLOGY

## 2023-04-14 PROCEDURE — 99214 OFFICE O/P EST MOD 30 MIN: CPT | Performed by: ANESTHESIOLOGY

## 2023-04-14 PROCEDURE — G8417 CALC BMI ABV UP PARAM F/U: HCPCS | Performed by: ANESTHESIOLOGY

## 2023-04-14 PROCEDURE — 3074F SYST BP LT 130 MM HG: CPT | Performed by: ANESTHESIOLOGY

## 2023-04-14 PROCEDURE — 1090F PRES/ABSN URINE INCON ASSESS: CPT | Performed by: ANESTHESIOLOGY

## 2023-04-14 PROCEDURE — 3078F DIAST BP <80 MM HG: CPT | Performed by: ANESTHESIOLOGY

## 2023-04-14 PROCEDURE — 1036F TOBACCO NON-USER: CPT | Performed by: ANESTHESIOLOGY

## 2023-04-14 PROCEDURE — 99213 OFFICE O/P EST LOW 20 MIN: CPT | Performed by: ANESTHESIOLOGY

## 2023-04-14 PROCEDURE — 3017F COLORECTAL CA SCREEN DOC REV: CPT | Performed by: ANESTHESIOLOGY

## 2023-04-14 RX ORDER — MULTIVIT WITH MINERALS/LUTEIN
250 TABLET ORAL DAILY
COMMUNITY

## 2023-04-24 ENCOUNTER — HOSPITAL ENCOUNTER (OUTPATIENT)
Age: 68
Setting detail: OUTPATIENT SURGERY
Discharge: HOME | End: 2023-04-24
Attending: ANESTHESIOLOGY | Admitting: ANESTHESIOLOGY
Payer: MEDICARE

## 2023-04-24 ENCOUNTER — HOSPITAL ENCOUNTER (OUTPATIENT)
Dept: GENERAL RADIOLOGY | Age: 68
Setting detail: OUTPATIENT SURGERY
Discharge: HOME | End: 2023-04-26
Attending: ANESTHESIOLOGY
Payer: MEDICARE

## 2023-04-24 VITALS
HEIGHT: 65 IN | BODY MASS INDEX: 25.66 KG/M2 | TEMPERATURE: 96.8 F | HEART RATE: 58 BPM | OXYGEN SATURATION: 99 % | SYSTOLIC BLOOD PRESSURE: 152 MMHG | RESPIRATION RATE: 16 BRPM | DIASTOLIC BLOOD PRESSURE: 74 MMHG | WEIGHT: 154 LBS

## 2023-04-24 DIAGNOSIS — R52 PAIN MANAGEMENT: ICD-10-CM

## 2023-04-24 PROCEDURE — 3600000002 HC SURGERY LEVEL 2 BASE: Performed by: ANESTHESIOLOGY

## 2023-04-24 PROCEDURE — 2709999900 HC NON-CHARGEABLE SUPPLY: Performed by: ANESTHESIOLOGY

## 2023-04-24 PROCEDURE — 6360000002 HC RX W HCPCS: Performed by: ANESTHESIOLOGY

## 2023-04-24 PROCEDURE — 7100000011 HC PHASE II RECOVERY - ADDTL 15 MIN: Performed by: ANESTHESIOLOGY

## 2023-04-24 PROCEDURE — 7100000010 HC PHASE II RECOVERY - FIRST 15 MIN: Performed by: ANESTHESIOLOGY

## 2023-04-24 PROCEDURE — 3209999900 FLUORO FOR SURGICAL PROCEDURES

## 2023-04-24 PROCEDURE — 2500000003 HC RX 250 WO HCPCS: Performed by: ANESTHESIOLOGY

## 2023-04-24 PROCEDURE — 27096 INJECT SACROILIAC JOINT: CPT | Performed by: ANESTHESIOLOGY

## 2023-04-24 PROCEDURE — 6360000004 HC RX CONTRAST MEDICATION: Performed by: ANESTHESIOLOGY

## 2023-04-24 RX ORDER — SODIUM CHLORIDE 0.9 % (FLUSH) 0.9 %
5-40 SYRINGE (ML) INJECTION PRN
Status: DISCONTINUED | OUTPATIENT
Start: 2023-04-24 | End: 2023-04-24 | Stop reason: HOSPADM

## 2023-04-24 RX ORDER — BUPIVACAINE HYDROCHLORIDE 2.5 MG/ML
INJECTION, SOLUTION EPIDURAL; INFILTRATION; INTRACAUDAL PRN
Status: DISCONTINUED | OUTPATIENT
Start: 2023-04-24 | End: 2023-04-24 | Stop reason: ALTCHOICE

## 2023-04-24 RX ORDER — METHYLPREDNISOLONE ACETATE 40 MG/ML
INJECTION, SUSPENSION INTRA-ARTICULAR; INTRALESIONAL; INTRAMUSCULAR; SOFT TISSUE PRN
Status: DISCONTINUED | OUTPATIENT
Start: 2023-04-24 | End: 2023-04-24 | Stop reason: ALTCHOICE

## 2023-04-24 RX ORDER — SODIUM CHLORIDE 0.9 % (FLUSH) 0.9 %
5-40 SYRINGE (ML) INJECTION EVERY 12 HOURS SCHEDULED
Status: DISCONTINUED | OUTPATIENT
Start: 2023-04-24 | End: 2023-04-24 | Stop reason: HOSPADM

## 2023-04-24 RX ORDER — LIDOCAINE HYDROCHLORIDE 5 MG/ML
INJECTION, SOLUTION INFILTRATION; INTRAVENOUS PRN
Status: DISCONTINUED | OUTPATIENT
Start: 2023-04-24 | End: 2023-04-24 | Stop reason: ALTCHOICE

## 2023-04-24 RX ORDER — SODIUM CHLORIDE 9 MG/ML
INJECTION, SOLUTION INTRAVENOUS PRN
Status: DISCONTINUED | OUTPATIENT
Start: 2023-04-24 | End: 2023-04-24 | Stop reason: HOSPADM

## 2023-04-24 ASSESSMENT — PAIN DESCRIPTION - DESCRIPTORS: DESCRIPTORS: SHARP;STABBING

## 2023-04-24 ASSESSMENT — PAIN - FUNCTIONAL ASSESSMENT: PAIN_FUNCTIONAL_ASSESSMENT: 0-10

## 2023-04-24 NOTE — INTERVAL H&P NOTE
Update History & Physical    The patient's History and Physical of April 14, 2023 was reviewed with the patient and I examined the patient. There was no change. The surgical site was confirmed by the patient and me. Plan: The risks, benefits, expected outcome, and alternative to the recommended procedure have been discussed with the patient. Patient understands and wants to proceed with the procedure.      Electronically signed by Tex Lane MD on 4/24/2023

## 2023-04-24 NOTE — OP NOTE
Operative Note      Patient: Darla Paget  YOB: 1955  MRN: [id number]    Date of Procedure: 2023    Pre-Op Diagnosis Codes:     * Sacroiliac joint dysfunction [M53.3]    Post-Op Diagnosis: Same       Procedure(s):  RIGHT SACROILIAC JOINT INJECTION UNDER FLUOROSCOPY    Surgeon(s):  Zachery Syed MD    Assistant:   * No surgical staff found *    Anesthesia: Local    Estimated Blood Loss (mL): Minimal    Complications: None    Specimens:   * No specimens in log *    Implants:  * No implants in log *      Drains: * No LDAs found *    Findings: good needle placement      Detailed Description of Procedure:   2023    Patient: Darla Paget  :  1955  Age:  79 y.o. Sex:  female     PRE-OPERATIVE DIAGNOSIS:     * Sacroiliac joint dysfunction [M53.3]    POST-OPERATIVE DIAGNOSIS: Same. PROCEDURE:  Fluoroscopic guided Right   sacroiliac joint injection with steroid. SURGEON:  Zachery Syed MD    ANESTHESIA: Local    ESTIMATED BLOOD LOSS: None.  ______________________________________________________________________  BRIEF HISTORY:  Darla Paget comes in today for  Right sacroiliac joint injection under fluoroscopic guidance. The potential complications as well as the procedure in detail were explained to her today. She has elected to undergo the aforementioned procedure. Arminda's complete History & Physical examination were reviewed in depth, a copy of which is in the chart. DESCRIPTION OF PROCEDURE:    After confirming written and informed consent, a time-out was performed and Kerri name and date of birth, the procedure to be performed as well as the plan for the location of the needle insertion were confirmed. The patient was brought into the procedure room and placed in the prone position on the fluoroscopy table. Standard monitors were placed and vital signs were observed throughout the procedure.  The low back and upper buttocks area was prepped

## 2023-04-24 NOTE — DISCHARGE INSTRUCTIONS
Mary Carballo Block/Radiofrequency  Home Going Instructions    1-Go home, rest for the remainder of the day  2-Please do not lift over 20 pounds the day of the injection  3-If you received sedation No: alcohol, driving, operating lawn mowers, plows, tractors or other dangerous equipment until next morning. Do not make important decisions or sign legal documents for 24 hours. You may experience light headedness, dizziness, nausea or sleepiness after sedation. Do not stay alone. A responsible adult must be with you for 24 hours. You could be nauseated from the medications you have received. Your IV site may be sore and bruised. 4-No dietary restrictions     5-Resume all medications the same day, blood thinners to be resumed 24 hours after injection if you were instructed to stop any. 6-Keep the surgical site clean and dry, you may shower the next morning and remove the      dressing. 7- No sitz baths, tub baths or hot tubs/swimming for 24 hours. 8- If you have any pain at the injection site(s), application of an ice pack to the area should be       helpful, 20 minutes on/20 minutes off for next 48 hours. 9- Call [facility] immediately at if you develop.   Fever greater than 100.4 F  Have bleeding or drainage from the puncture site  Have progressive Leg/arm numbness and or weakness  Loss of control of bowel and or bladder (wet/soil yourself)  Severe headache with inability to lift head  10-You may return to work the next day

## 2023-06-14 ENCOUNTER — OFFICE VISIT (OUTPATIENT)
Dept: PAIN MANAGEMENT | Age: 68
End: 2023-06-14
Payer: MEDICARE

## 2023-06-14 VITALS
DIASTOLIC BLOOD PRESSURE: 75 MMHG | SYSTOLIC BLOOD PRESSURE: 145 MMHG | TEMPERATURE: 98.1 F | RESPIRATION RATE: 16 BRPM | OXYGEN SATURATION: 97 % | HEART RATE: 59 BPM

## 2023-06-14 DIAGNOSIS — M53.3 SACROILIAC DYSFUNCTION: ICD-10-CM

## 2023-06-14 DIAGNOSIS — M47.817 LUMBOSACRAL SPONDYLOSIS WITHOUT MYELOPATHY: ICD-10-CM

## 2023-06-14 DIAGNOSIS — M48.061 SPINAL STENOSIS OF LUMBAR REGION, UNSPECIFIED WHETHER NEUROGENIC CLAUDICATION PRESENT: Primary | ICD-10-CM

## 2023-06-14 DIAGNOSIS — M51.36 DDD (DEGENERATIVE DISC DISEASE), LUMBAR: ICD-10-CM

## 2023-06-14 DIAGNOSIS — M54.16 LUMBAR RADICULAR PAIN: ICD-10-CM

## 2023-06-14 PROCEDURE — 1123F ACP DISCUSS/DSCN MKR DOCD: CPT | Performed by: ANESTHESIOLOGY

## 2023-06-14 PROCEDURE — G8427 DOCREV CUR MEDS BY ELIG CLIN: HCPCS | Performed by: ANESTHESIOLOGY

## 2023-06-14 PROCEDURE — G8399 PT W/DXA RESULTS DOCUMENT: HCPCS | Performed by: ANESTHESIOLOGY

## 2023-06-14 PROCEDURE — G8417 CALC BMI ABV UP PARAM F/U: HCPCS | Performed by: ANESTHESIOLOGY

## 2023-06-14 PROCEDURE — 3078F DIAST BP <80 MM HG: CPT | Performed by: ANESTHESIOLOGY

## 2023-06-14 PROCEDURE — 1036F TOBACCO NON-USER: CPT | Performed by: ANESTHESIOLOGY

## 2023-06-14 PROCEDURE — 1090F PRES/ABSN URINE INCON ASSESS: CPT | Performed by: ANESTHESIOLOGY

## 2023-06-14 PROCEDURE — 3074F SYST BP LT 130 MM HG: CPT | Performed by: ANESTHESIOLOGY

## 2023-06-14 PROCEDURE — 99213 OFFICE O/P EST LOW 20 MIN: CPT | Performed by: ANESTHESIOLOGY

## 2023-06-14 PROCEDURE — 3017F COLORECTAL CA SCREEN DOC REV: CPT | Performed by: ANESTHESIOLOGY

## 2023-06-14 PROCEDURE — 99213 OFFICE O/P EST LOW 20 MIN: CPT

## 2023-06-14 RX ORDER — CELECOXIB 400 MG/1
400 CAPSULE ORAL DAILY
COMMUNITY
Start: 2023-05-12 | End: 2023-06-14

## 2023-06-14 RX ORDER — CELECOXIB 400 MG/1
400 CAPSULE ORAL DAILY
COMMUNITY

## 2023-09-11 ENCOUNTER — OFFICE VISIT (OUTPATIENT)
Dept: PAIN MANAGEMENT | Age: 68
End: 2023-09-11
Payer: MEDICARE

## 2023-09-11 ENCOUNTER — PREP FOR PROCEDURE (OUTPATIENT)
Dept: PAIN MANAGEMENT | Age: 68
End: 2023-09-11

## 2023-09-11 VITALS
BODY MASS INDEX: 25.66 KG/M2 | HEART RATE: 62 BPM | HEIGHT: 65 IN | TEMPERATURE: 97.2 F | DIASTOLIC BLOOD PRESSURE: 60 MMHG | WEIGHT: 154 LBS | OXYGEN SATURATION: 97 % | RESPIRATION RATE: 18 BRPM | SYSTOLIC BLOOD PRESSURE: 159 MMHG

## 2023-09-11 DIAGNOSIS — G57.01 PIRIFORMIS SYNDROME OF RIGHT SIDE: ICD-10-CM

## 2023-09-11 DIAGNOSIS — M48.061 SPINAL STENOSIS OF LUMBAR REGION, UNSPECIFIED WHETHER NEUROGENIC CLAUDICATION PRESENT: Primary | ICD-10-CM

## 2023-09-11 DIAGNOSIS — M54.16 LUMBAR RADICULAR PAIN: ICD-10-CM

## 2023-09-11 DIAGNOSIS — M53.3 SACROILIAC DYSFUNCTION: Primary | ICD-10-CM

## 2023-09-11 DIAGNOSIS — M47.817 LUMBOSACRAL SPONDYLOSIS WITHOUT MYELOPATHY: ICD-10-CM

## 2023-09-11 DIAGNOSIS — M48.062 SPINAL STENOSIS OF LUMBAR REGION WITH NEUROGENIC CLAUDICATION: ICD-10-CM

## 2023-09-11 PROCEDURE — 1090F PRES/ABSN URINE INCON ASSESS: CPT | Performed by: ANESTHESIOLOGY

## 2023-09-11 PROCEDURE — G8399 PT W/DXA RESULTS DOCUMENT: HCPCS | Performed by: ANESTHESIOLOGY

## 2023-09-11 PROCEDURE — 3017F COLORECTAL CA SCREEN DOC REV: CPT | Performed by: ANESTHESIOLOGY

## 2023-09-11 PROCEDURE — 1123F ACP DISCUSS/DSCN MKR DOCD: CPT | Performed by: ANESTHESIOLOGY

## 2023-09-11 PROCEDURE — G8417 CALC BMI ABV UP PARAM F/U: HCPCS | Performed by: ANESTHESIOLOGY

## 2023-09-11 PROCEDURE — 3074F SYST BP LT 130 MM HG: CPT | Performed by: ANESTHESIOLOGY

## 2023-09-11 PROCEDURE — 1036F TOBACCO NON-USER: CPT | Performed by: ANESTHESIOLOGY

## 2023-09-11 PROCEDURE — 3078F DIAST BP <80 MM HG: CPT | Performed by: ANESTHESIOLOGY

## 2023-09-11 PROCEDURE — 99214 OFFICE O/P EST MOD 30 MIN: CPT | Performed by: ANESTHESIOLOGY

## 2023-09-11 PROCEDURE — 99213 OFFICE O/P EST LOW 20 MIN: CPT | Performed by: ANESTHESIOLOGY

## 2023-09-11 PROCEDURE — G8427 DOCREV CUR MEDS BY ELIG CLIN: HCPCS | Performed by: ANESTHESIOLOGY

## 2023-09-11 RX ORDER — SODIUM CHLORIDE 0.9 % (FLUSH) 0.9 %
5-40 SYRINGE (ML) INJECTION EVERY 12 HOURS SCHEDULED
Status: CANCELLED | OUTPATIENT
Start: 2023-09-11

## 2023-09-11 RX ORDER — SODIUM CHLORIDE 9 MG/ML
INJECTION, SOLUTION INTRAVENOUS PRN
Status: CANCELLED | OUTPATIENT
Start: 2023-09-11

## 2023-09-11 RX ORDER — SODIUM CHLORIDE 0.9 % (FLUSH) 0.9 %
5-40 SYRINGE (ML) INJECTION PRN
Status: CANCELLED | OUTPATIENT
Start: 2023-09-11

## 2023-09-11 RX ORDER — BLOOD SUGAR DIAGNOSTIC
STRIP MISCELLANEOUS
COMMUNITY
Start: 2023-07-02

## 2023-09-11 NOTE — PROGRESS NOTES
[facility]        [address]  [address], [facility]  Dept: [phone]      Follow up Note      Chanell Fengdows     Date of Visit:  9/11/2023    CC:  Patient presents for follow up   Chief Complaint   Patient presents with    Pain       HPI:  Low back pain over the right lower lumbar area and intermittent right LE pain. Pain causes functional limitations/ limits Adl's : Yes     Prior treatment at [facility]- in the past.     Has been evaluated buy [facility] - recommended conservative treatment. Nursing notes and details of the pain history reviewed. Please see intake notes for details. Notes:  S/P Gastric bypass surgery. On Medical Marijuana. Previous treatments:  Physical Therapy /Chiropractic treatment/ HEP: yes     Medications: - NSAID's : yes - caution due to H/o gastric bypass                       - Membrane stabilizers : yes - gabapentin                       - Opioids : no                       - Adjuvants or Others : yes     Spine Surgeries: no     Anticoagulation medications: no.     H/O Smoking: no  H/O alcohol abuse : no  H/O Illicit drug use : denies. Uses medical marijuana     Employment: retired     Imaging:   MRI of LS spine: 7/11/2022:  Impression   1. No fracture or bony destructive lesion. 2. Severe central canal stenosis at L3-4. Moderate stenoses at L1-2 and   L2-3. Mild stenoses at L4-5 and L5-S1.   3.  Multilevel neural foraminal stenoses, worst (severe) at the left L3-4,   right L4-5 and bilateral L5-S1 levels. 4. Multilevel stenoses of the lateral recess, worse (severe) at the right   L5-S1 level, resulting in significant impingement of the right S1 nerve. OARRS report[de-identified] reviewed.     Past Medical History:   Diagnosis Date    Arthritis     Chronic back pain     Chronic hip pain     Constipation     Diabetes mellitus (HCC)     no longer on meds since wt loss surgery    Hyperlipidemia     Hypertension     no meds since weight loss

## 2023-09-11 NOTE — H&P (VIEW-ONLY)
[facility] - BEHAVIORAL HEALTH SERVICES Pain Management        [address] - BEHAVIORAL HEALTH SERVICES, [facility]  Dept: [phone]      Follow up Note      Jareth Coon     Date of Visit:  9/11/2023    CC:  Patient presents for follow up   Chief Complaint   Patient presents with    Pain       HPI:  Low back pain over the right lower lumbar area and intermittent right LE pain. Pain causes functional limitations/ limits Adl's : Yes     Prior treatment at [facility]- in the past.     Has been evaluated buy NS - recommended conservative treatment. Nursing notes and details of the pain history reviewed. Please see intake notes for details. Notes:  S/P Gastric bypass surgery. On Medical Marijuana. Previous treatments:  Physical Therapy /Chiropractic treatment/ HEP: yes     Medications: - NSAID's : yes - caution due to H/o gastric bypass                       - Membrane stabilizers : yes - gabapentin                       - Opioids : no                       - Adjuvants or Others : yes     Spine Surgeries: no     Anticoagulation medications: no.     H/O Smoking: no  H/O alcohol abuse : no  H/O Illicit drug use : denies. Uses medical marijuana     Employment: retired     Imaging:   MRI of LS spine: 7/11/2022:  Impression   1. No fracture or bony destructive lesion. 2. Severe central canal stenosis at L3-4. Moderate stenoses at L1-2 and   L2-3. Mild stenoses at L4-5 and L5-S1.   3.  Multilevel neural foraminal stenoses, worst (severe) at the left L3-4,   right L4-5 and bilateral L5-S1 levels. 4. Multilevel stenoses of the lateral recess, worse (severe) at the right   L5-S1 level, resulting in significant impingement of the right S1 nerve. OARRS report[de-identified] reviewed.     Past Medical History:   Diagnosis Date    Arthritis     Chronic back pain     Chronic hip pain     Constipation     Diabetes mellitus (HCC)     no longer on meds since wt loss surgery    Hyperlipidemia     Hypertension     no meds since weight loss other day   Yes Historical Provider, MD   vitamin E 400 UNIT capsule Take 1 capsule by mouth every other day   Yes Historical Provider, MD   melatonin 10 MG CAPS capsule Take 1 capsule by mouth nightly as needed   Yes Historical Provider, MD   medical marijuana Take by mouth as needed. Ceola Sindhu  / Judeen Oven   Yes Historical Provider, MD   atorvastatin (LIPITOR) 40 MG tablet Take 1 tablet by mouth daily   Yes Historical Provider, MD   ketotifen (ZADITOR) 0.025 % ophthalmic solution Place 1 drop into both eyes daily   Yes Historical Provider, MD   Multiple Vitamin (MVI, BARIATRIC ADVANTAGE MULTI-FORMULA, CHEW TAB) Take 1 tablet by mouth 2 times daily   Yes Historical Provider, MD   Calcium Citrate-Vitamin D (CA CITR+VIT D,CELEBRATE CALCIUM PLUS 500, TAB) 1 tablet 3 times daily   Yes Historical Provider, MD   Cholecalciferol (VITAMIN D PO) Take 5,000 Units by mouth every other day. Bariatric   Yes Historical Provider, MD Godinez Ovens strip use 1 TEST STRIP to TEST BLOOD SUGAR once daily 23   Historical Provider, MD       Allergies   Allergen Reactions    Penicillins Hives and Rash    Sulfa Antibiotics Hives and Rash       Social History     Socioeconomic History    Marital status: Single     Spouse name: Not on file    Number of children: Not on file    Years of education: Not on file    Highest education level: Not on file   Occupational History    Not on file   Tobacco Use    Smoking status: Former     Packs/day: 1.50     Years: 4.00     Additional pack years: 0.00     Total pack years: 6.00     Types: Cigarettes     Quit date: 1977     Years since quittin.3    Smokeless tobacco: Never   Vaping Use    Vaping Use: Never used   Substance and Sexual Activity    Alcohol use:  Yes     Alcohol/week: 0.8 standard drinks of alcohol     Types: 1 Standard drinks or equivalent per week     Comment: Occasionally    Drug use: Yes     Types: Marijuana Marijane Lizzeth)     Comment: Medical Marijuana (nightly)    Sexual activity:

## 2023-09-14 ENCOUNTER — TELEPHONE (OUTPATIENT)
Dept: PAIN MANAGEMENT | Age: 68
End: 2023-09-14

## 2023-09-14 DIAGNOSIS — M54.16 LUMBAR RADICULOPATHY: ICD-10-CM

## 2023-09-14 NOTE — TELEPHONE ENCOUNTER
Call to Deirdrekalli Keesha that procedure was approved for 9/21/2023 and that Karla Bush should call her a few days before for the pre op call and between 2:00 PM and 4:00 PM  the business day before with the arrival time. Instructed Arminda to hold ibuprofen for 24 hours, naprosyn for 4 days and any aspirin containing products or fish oil for 7 days. Instructed to call office back if any questions. Arminda verbalized understanding.     Electronically signed by Beth Zaragoza RN on 9/14/2023 at 2:14 PM

## 2023-09-19 NOTE — PROGRESS NOTES
[facility] PAIN MANAGEMENT  INSTRUCTIONS  . .......................................................................................................................................... [x] Parking the day of Surgery is located in the [facility].   Upon entering the door, make immediate right into the surgery reception room    [x]  Bring photo ID and insurance card     [x] You may have a light breakfast day of procedure    [x]  Wear loose comfortable clothing    [x]  Please follow instructions for medications as given per Dr's office    [x] You can expect a call the business day prior to procedure to notify you of your arrival time     [x] Please arrange for     []  Other instructions

## 2023-09-21 ENCOUNTER — HOSPITAL ENCOUNTER (OUTPATIENT)
Age: 68
Setting detail: OUTPATIENT SURGERY
Discharge: HOME | End: 2023-09-21
Attending: ANESTHESIOLOGY | Admitting: ANESTHESIOLOGY
Payer: MEDICARE

## 2023-09-21 ENCOUNTER — HOSPITAL ENCOUNTER (OUTPATIENT)
Dept: GENERAL RADIOLOGY | Age: 68
Setting detail: OUTPATIENT SURGERY
Discharge: HOME | End: 2023-09-23
Attending: ANESTHESIOLOGY
Payer: MEDICARE

## 2023-09-21 VITALS
DIASTOLIC BLOOD PRESSURE: 79 MMHG | BODY MASS INDEX: 25.99 KG/M2 | WEIGHT: 156 LBS | SYSTOLIC BLOOD PRESSURE: 168 MMHG | RESPIRATION RATE: 16 BRPM | HEART RATE: 79 BPM | OXYGEN SATURATION: 98 % | TEMPERATURE: 97 F | HEIGHT: 65 IN

## 2023-09-21 DIAGNOSIS — R52 PAIN MANAGEMENT: ICD-10-CM

## 2023-09-21 PROCEDURE — 6360000004 HC RX CONTRAST MEDICATION: Performed by: ANESTHESIOLOGY

## 2023-09-21 PROCEDURE — 62323 NJX INTERLAMINAR LMBR/SAC: CPT | Performed by: ANESTHESIOLOGY

## 2023-09-21 PROCEDURE — 3600000012 HC SURGERY LEVEL 2 ADDTL 15MIN: Performed by: ANESTHESIOLOGY

## 2023-09-21 PROCEDURE — 2709999900 HC NON-CHARGEABLE SUPPLY: Performed by: ANESTHESIOLOGY

## 2023-09-21 PROCEDURE — 2500000003 HC RX 250 WO HCPCS: Performed by: ANESTHESIOLOGY

## 2023-09-21 PROCEDURE — 6360000002 HC RX W HCPCS: Performed by: ANESTHESIOLOGY

## 2023-09-21 PROCEDURE — 3600000002 HC SURGERY LEVEL 2 BASE: Performed by: ANESTHESIOLOGY

## 2023-09-21 PROCEDURE — 7100000011 HC PHASE II RECOVERY - ADDTL 15 MIN: Performed by: ANESTHESIOLOGY

## 2023-09-21 PROCEDURE — 20552 NJX 1/MLT TRIGGER POINT 1/2: CPT | Performed by: ANESTHESIOLOGY

## 2023-09-21 PROCEDURE — 7100000010 HC PHASE II RECOVERY - FIRST 15 MIN: Performed by: ANESTHESIOLOGY

## 2023-09-21 RX ORDER — METHYLPREDNISOLONE ACETATE 40 MG/ML
INJECTION, SUSPENSION INTRA-ARTICULAR; INTRALESIONAL; INTRAMUSCULAR; SOFT TISSUE PRN
Status: DISCONTINUED | OUTPATIENT
Start: 2023-09-21 | End: 2023-09-21 | Stop reason: ALTCHOICE

## 2023-09-21 RX ORDER — SODIUM CHLORIDE 0.9 % (FLUSH) 0.9 %
5-40 SYRINGE (ML) INJECTION PRN
Status: DISCONTINUED | OUTPATIENT
Start: 2023-09-21 | End: 2023-09-21 | Stop reason: HOSPADM

## 2023-09-21 RX ORDER — BUPIVACAINE HYDROCHLORIDE 2.5 MG/ML
INJECTION, SOLUTION EPIDURAL; INFILTRATION; INTRACAUDAL PRN
Status: DISCONTINUED | OUTPATIENT
Start: 2023-09-21 | End: 2023-09-21 | Stop reason: ALTCHOICE

## 2023-09-21 RX ORDER — IOPAMIDOL 612 MG/ML
INJECTION, SOLUTION INTRATHECAL PRN
Status: DISCONTINUED | OUTPATIENT
Start: 2023-09-21 | End: 2023-09-21 | Stop reason: ALTCHOICE

## 2023-09-21 RX ORDER — SODIUM CHLORIDE 9 MG/ML
INJECTION, SOLUTION INTRAVENOUS PRN
Status: DISCONTINUED | OUTPATIENT
Start: 2023-09-21 | End: 2023-09-21 | Stop reason: HOSPADM

## 2023-09-21 RX ORDER — LIDOCAINE HYDROCHLORIDE 5 MG/ML
INJECTION, SOLUTION INFILTRATION; INTRAVENOUS PRN
Status: DISCONTINUED | OUTPATIENT
Start: 2023-09-21 | End: 2023-09-21 | Stop reason: ALTCHOICE

## 2023-09-21 RX ORDER — SODIUM CHLORIDE 0.9 % (FLUSH) 0.9 %
5-40 SYRINGE (ML) INJECTION EVERY 12 HOURS SCHEDULED
Status: DISCONTINUED | OUTPATIENT
Start: 2023-09-21 | End: 2023-09-21 | Stop reason: HOSPADM

## 2023-09-21 ASSESSMENT — PAIN SCALES - GENERAL: PAINLEVEL_OUTOF10: 0

## 2023-09-21 ASSESSMENT — PAIN - FUNCTIONAL ASSESSMENT: PAIN_FUNCTIONAL_ASSESSMENT: 0-10

## 2023-09-21 ASSESSMENT — PAIN DESCRIPTION - ORIENTATION: ORIENTATION: RIGHT

## 2023-09-21 ASSESSMENT — PAIN DESCRIPTION - DESCRIPTORS: DESCRIPTORS: ACHING;DISCOMFORT;SORE

## 2023-09-21 ASSESSMENT — PAIN DESCRIPTION - LOCATION: LOCATION: BACK

## 2023-09-21 NOTE — INTERVAL H&P NOTE
Update History & Physical    The patient's History and Physical of September 11, 2023 was reviewed with the patient and I examined the patient. There was no change. The surgical site was confirmed by the patient and me. Plan: The risks, benefits, expected outcome, and alternative to the recommended procedure have been discussed with the patient. Patient understands and wants to proceed with the procedure.      Electronically signed by Jose Martin MD on 9/21/2023

## 2023-09-21 NOTE — DISCHARGE INSTRUCTIONS
[facility]  Dr. Willie Horner Block/Radiofrequency  Home Going Instructions    1-Go home, rest for the remainder of the day  2-Please do not lift over 20 pounds the day of the injection  3-If you received sedation No: alcohol, driving, operating lawn mowers, plows, tractors or other dangerous equipment until next morning. Do not make important decisions or sign legal documents for 24 hours. You may experience light headedness, dizziness, nausea or sleepiness after sedation. Do not stay alone. A responsible adult must be with you for 24 hours. You could be nauseated from the medications you have received. Your IV site may be sore and bruised. 4-No dietary restrictions     5-Resume all medications the same day, blood thinners to be resumed 24 hours after injection if you were instructed to stop any. 6-Keep the surgical site clean and dry, you may shower the next morning and remove the      dressing. 7- No sitz baths, tub baths or hot tubs/swimming for 24 hours. 8- If you have any pain at the injection site(s), application of an ice pack to the area should be       helpful, 20 minutes on/20 minutes off for next 48 hours. 9- Call [facility] immediately at if you develop.   Fever greater than 100.4 F  Have bleeding or drainage from the puncture site  Have progressive Leg/arm numbness and or weakness  Loss of control of bowel and or bladder (wet/soil yourself)  Severe headache with inability to lift head  10-You may return to work the next day

## 2023-09-21 NOTE — OP NOTE
Operative Note      Patient: Terry Gonzales  YOB: 1955  MRN: [id number]    Date of Procedure: 2023    Pre-Op Diagnosis Codes:     * Lumbar radiculopathy [M54.16]  -Pyriformis syndrome    Post-Op Diagnosis: Same       Procedure(s):  1) LUMBAR EPIDURAL STEROID INJECTION UNDER FLUOROSCOPIC GUIDANCE AT L5-S1 RIGHT PARAMEDIAN   2)  RIGHT PYRIFORMIS INJECTION UNDER FLUOROSCOPY    Surgeon(s):  Rashi Noriega MD    Assistant:   * No surgical staff found *    Anesthesia: Local    Estimated Blood Loss (mL): Minimal    Complications: None    Specimens:   * No specimens in log *    Implants:  * No implants in log *      Drains: * No LDAs found *    Findings: good needle placement        Detailed Description of Procedure:   2023    Patient: Terry Gonzales  :  1955  Age:  79 y.o. Sex:  female     PRE-OPERATIVE DIAGNOSIS:   -Lumbar disc displacement, lumbar radiculopathy.    -Pyriformis syndrome    POST-OPERATIVE DIAGNOSIS: Same. PROCEDURE:   1) LUMBAR EPIDURAL STEROID INJECTION UNDER FLUOROSCOPIC GUIDANCE AT L5-S1 RIGHT PARAMEDIAN   2)  RIGHT PYRIFORMIS INJECTION UNDER FLUOROSCOPY    SURGEON: Rashi Noriega MD    ANESTHESIA: local    ESTIMATED BLOOD LOSS: None.  ______________________________________________________________________    BRIEF HISTORY:  Terry Gonzales comes in today for the abpove procedure. The potential complications of this procedure were discussed with her again today. She has elected to undergo the aforementioned procedure. Esther complete History & Physical examination were reviewed in depth, a copy of which is in the chart. DESCRIPTION OF PROCEDURE:    After confirming written and informed consent, a time-out was performed and Esther name and date of birth, the procedure to be performed as well as the plan for the location of the needle insertion were confirmed.     - The patient was brought into the procedure room and placed in the prone signs remained stable throughout the procedure. The patient was escorted to the recovery area where they remained until discharge and written discharge instructions for the procedure were given. Plan: Mychal Massey will return to our pain management center as scheduled.      Chely Javier MD

## 2023-10-18 ENCOUNTER — OFFICE VISIT (OUTPATIENT)
Dept: PAIN MANAGEMENT | Age: 68
End: 2023-10-18
Payer: MEDICARE

## 2023-10-18 VITALS
SYSTOLIC BLOOD PRESSURE: 128 MMHG | DIASTOLIC BLOOD PRESSURE: 66 MMHG | HEART RATE: 65 BPM | RESPIRATION RATE: 16 BRPM | OXYGEN SATURATION: 98 % | TEMPERATURE: 97.2 F

## 2023-10-18 DIAGNOSIS — M53.3 SACROILIAC DYSFUNCTION: ICD-10-CM

## 2023-10-18 DIAGNOSIS — M48.061 SPINAL STENOSIS OF LUMBAR REGION, UNSPECIFIED WHETHER NEUROGENIC CLAUDICATION PRESENT: Primary | ICD-10-CM

## 2023-10-18 DIAGNOSIS — M47.817 LUMBOSACRAL SPONDYLOSIS WITHOUT MYELOPATHY: ICD-10-CM

## 2023-10-18 DIAGNOSIS — M79.18 CHRONIC MYOFASCIAL PAIN: ICD-10-CM

## 2023-10-18 DIAGNOSIS — M51.36 DDD (DEGENERATIVE DISC DISEASE), LUMBAR: ICD-10-CM

## 2023-10-18 DIAGNOSIS — G89.29 CHRONIC MYOFASCIAL PAIN: ICD-10-CM

## 2023-10-18 DIAGNOSIS — Z98.84 S/P GASTRIC BYPASS: ICD-10-CM

## 2023-10-18 PROCEDURE — 1036F TOBACCO NON-USER: CPT | Performed by: ANESTHESIOLOGY

## 2023-10-18 PROCEDURE — 99214 OFFICE O/P EST MOD 30 MIN: CPT | Performed by: ANESTHESIOLOGY

## 2023-10-18 PROCEDURE — G8427 DOCREV CUR MEDS BY ELIG CLIN: HCPCS | Performed by: ANESTHESIOLOGY

## 2023-10-18 PROCEDURE — 1123F ACP DISCUSS/DSCN MKR DOCD: CPT | Performed by: ANESTHESIOLOGY

## 2023-10-18 PROCEDURE — 1090F PRES/ABSN URINE INCON ASSESS: CPT | Performed by: ANESTHESIOLOGY

## 2023-10-18 PROCEDURE — 20552 NJX 1/MLT TRIGGER POINT 1/2: CPT | Performed by: ANESTHESIOLOGY

## 2023-10-18 PROCEDURE — G8399 PT W/DXA RESULTS DOCUMENT: HCPCS | Performed by: ANESTHESIOLOGY

## 2023-10-18 PROCEDURE — G8417 CALC BMI ABV UP PARAM F/U: HCPCS | Performed by: ANESTHESIOLOGY

## 2023-10-18 PROCEDURE — G8484 FLU IMMUNIZE NO ADMIN: HCPCS | Performed by: ANESTHESIOLOGY

## 2023-10-18 PROCEDURE — 3078F DIAST BP <80 MM HG: CPT | Performed by: ANESTHESIOLOGY

## 2023-10-18 PROCEDURE — 3074F SYST BP LT 130 MM HG: CPT | Performed by: ANESTHESIOLOGY

## 2023-10-18 PROCEDURE — 3017F COLORECTAL CA SCREEN DOC REV: CPT | Performed by: ANESTHESIOLOGY

## 2023-10-18 RX ORDER — METHYLPREDNISOLONE ACETATE 40 MG/ML
40 INJECTION, SUSPENSION INTRA-ARTICULAR; INTRALESIONAL; INTRAMUSCULAR; SOFT TISSUE ONCE
Status: COMPLETED | OUTPATIENT
Start: 2023-10-18 | End: 2023-10-18

## 2023-10-18 RX ORDER — BACLOFEN 5 MG/1
10 TABLET ORAL 2 TIMES DAILY PRN
Qty: 30 TABLET | Refills: 1 | Status: SHIPPED | OUTPATIENT
Start: 2023-10-18

## 2023-10-18 RX ORDER — BUPIVACAINE HYDROCHLORIDE 2.5 MG/ML
8 INJECTION, SOLUTION INFILTRATION; PERINEURAL ONCE
Status: COMPLETED | OUTPATIENT
Start: 2023-10-18 | End: 2023-10-18

## 2023-10-18 RX ADMIN — METHYLPREDNISOLONE ACETATE 40 MG: 40 INJECTION, SUSPENSION INTRA-ARTICULAR; INTRALESIONAL; INTRAMUSCULAR; INTRASYNOVIAL; SOFT TISSUE at 13:06

## 2023-10-18 RX ADMIN — BUPIVACAINE HYDROCHLORIDE 8 ML: 2.5 INJECTION, SOLUTION INFILTRATION; PERINEURAL at 13:05

## 2023-10-18 NOTE — PROGRESS NOTES
[facility]        [address] [place] [address]  Dept: [phone]      Follow up Note      Chanell Fengdows     Date of Visit:  10/18/2023    CC:  Patient presents for follow up   Chief Complaint   Patient presents with    Follow Up After Procedure     1) LUMBAR EPIDURAL STEROID INJECTION UNDER FLUOROSCOPIC GUIDANCE AT L5-S1 RIGHT PARAMEDIAN   2)  RIGHT PYRIFORMIS INJECTION UNDER FLUOROSCOPY       Lower Back Pain     Into right buttock       HPI:  Low back pain over the right lower lumbar area and intermittent right LE pain. Pain causes functional limitations/ limits Adl's : Yes     Prior treatment at [facility]- in the past.     Has been evaluated buy [facility] - recommended conservative treatment. Nursing notes and details of the pain history reviewed. Please see intake notes for details. Interventions had helped . Notes:  S/P Gastric bypass surgery. On Medical Marijuana. Previous treatments:  Physical Therapy /Chiropractic treatment/ HEP: yes     Medications: - NSAID's : yes - caution due to H/o gastric bypass                       - Membrane stabilizers : yes - gabapentin                       - Opioids : no                       - Adjuvants or Others : yes     Spine Surgeries: no     Anticoagulation medications: no.     H/O Smoking: no  H/O alcohol abuse : no  H/O Illicit drug use : denies. Uses medical marijuana     Employment: retired     Imaging:   MRI of LS spine: 7/11/2022:  Impression   1. No fracture or bony destructive lesion. 2. Severe central canal stenosis at L3-4. Moderate stenoses at L1-2 and   L2-3. Mild stenoses at L4-5 and L5-S1.   3.  Multilevel neural foraminal stenoses, worst (severe) at the left L3-4,   right L4-5 and bilateral L5-S1 levels. 4. Multilevel stenoses of the lateral recess, worse (severe) at the right   L5-S1 level, resulting in significant impingement of the right S1 nerve. OARRS report[de-identified] reviewed.     Past

## 2023-10-25 ENCOUNTER — APPOINTMENT (OUTPATIENT)
Dept: GENERAL RADIOLOGY | Age: 68
End: 2023-10-25
Payer: MEDICARE

## 2023-10-25 ENCOUNTER — APPOINTMENT (OUTPATIENT)
Dept: CT IMAGING | Age: 68
End: 2023-10-25
Payer: MEDICARE

## 2023-10-25 ENCOUNTER — HOSPITAL ENCOUNTER (INPATIENT)
Age: 68
LOS: 3 days | Discharge: HOME | End: 2023-10-28
Attending: INTERNAL MEDICINE | Admitting: INTERNAL MEDICINE
Payer: MEDICARE

## 2023-10-25 ENCOUNTER — HOSPITAL ENCOUNTER (EMERGENCY)
Age: 68
Discharge: ANOTHER HOSPITAL | End: 2023-10-25
Attending: STUDENT IN AN ORGANIZED HEALTH CARE EDUCATION/TRAINING PROGRAM
Payer: MEDICARE

## 2023-10-25 VITALS
HEART RATE: 89 BPM | RESPIRATION RATE: 18 BRPM | BODY MASS INDEX: 25.99 KG/M2 | WEIGHT: 156 LBS | TEMPERATURE: 97.9 F | OXYGEN SATURATION: 100 % | DIASTOLIC BLOOD PRESSURE: 70 MMHG | SYSTOLIC BLOOD PRESSURE: 152 MMHG | HEIGHT: 65 IN

## 2023-10-25 DIAGNOSIS — K66.8 PNEUMOPERITONEUM: Primary | ICD-10-CM

## 2023-10-25 PROBLEM — K27.5 PERFORATED ULCER (HCC): Status: ACTIVE | Noted: 2023-10-25

## 2023-10-25 PROBLEM — K63.1 PERFORATED BOWEL (HCC): Status: ACTIVE | Noted: 2023-10-25

## 2023-10-25 LAB
ABO + RH BLD: NORMAL
ALBUMIN SERPL-MCNC: 4.4 G/DL (ref 3.5–5.2)
ALP SERPL-CCNC: 75 U/L (ref 35–104)
ALT SERPL-CCNC: 38 U/L (ref 0–32)
ANION GAP SERPL CALCULATED.3IONS-SCNC: 15 MMOL/L (ref 7–16)
ARM BAND NUMBER: NORMAL
AST SERPL-CCNC: 27 U/L (ref 0–31)
BASOPHILS # BLD: 0.02 K/UL (ref 0–0.2)
BASOPHILS NFR BLD: 0 % (ref 0–2)
BILIRUB SERPL-MCNC: 0.4 MG/DL (ref 0–1.2)
BILIRUB UR QL STRIP: NEGATIVE
BLOOD BANK SAMPLE EXPIRATION: NORMAL
BLOOD GROUP ANTIBODIES SERPL: NEGATIVE
BUN SERPL-MCNC: 28 MG/DL (ref 6–23)
CALCIUM SERPL-MCNC: 9.4 MG/DL (ref 8.6–10.2)
CHLORIDE SERPL-SCNC: 104 MMOL/L (ref 98–107)
CLARITY UR: CLEAR
CO2 SERPL-SCNC: 21 MMOL/L (ref 22–29)
COLOR UR: YELLOW
CREAT SERPL-MCNC: 1.1 MG/DL (ref 0.5–1)
EOSINOPHIL # BLD: 0.01 K/UL (ref 0.05–0.5)
EOSINOPHILS RELATIVE PERCENT: 0 % (ref 0–6)
ERYTHROCYTE [DISTWIDTH] IN BLOOD BY AUTOMATED COUNT: 13.7 % (ref 11.5–15)
GFR SERPL CREATININE-BSD FRML MDRD: 58 ML/MIN/1.73M2
GLUCOSE SERPL-MCNC: 243 MG/DL (ref 74–99)
GLUCOSE UR STRIP-MCNC: 100 MG/DL
HCT VFR BLD AUTO: 38.3 % (ref 34–48)
HGB BLD-MCNC: 12.5 G/DL (ref 11.5–15.5)
HGB UR QL STRIP.AUTO: ABNORMAL
IMM GRANULOCYTES # BLD AUTO: 0.05 K/UL (ref 0–0.58)
IMM GRANULOCYTES NFR BLD: 0 % (ref 0–5)
KETONES UR STRIP-MCNC: NEGATIVE MG/DL
LACTATE BLDV-SCNC: 1.8 MMOL/L (ref 0.5–2.2)
LEUKOCYTE ESTERASE UR QL STRIP: NEGATIVE
LIPASE SERPL-CCNC: 43 U/L (ref 13–60)
LYMPHOCYTES NFR BLD: 0.4 K/UL (ref 1.5–4)
LYMPHOCYTES RELATIVE PERCENT: 3 % (ref 20–42)
MCH RBC QN AUTO: 31.5 PG (ref 26–35)
MCHC RBC AUTO-ENTMCNC: 32.6 G/DL (ref 32–34.5)
MCV RBC AUTO: 96.5 FL (ref 80–99.9)
MONOCYTES NFR BLD: 0.64 K/UL (ref 0.1–0.95)
MONOCYTES NFR BLD: 5 % (ref 2–12)
NEUTROPHILS NFR BLD: 91 % (ref 43–80)
NEUTS SEG NFR BLD: 11.38 K/UL (ref 1.8–7.3)
NITRITE UR QL STRIP: NEGATIVE
PH UR STRIP: 5.5 [PH] (ref 5–9)
PLATELET # BLD AUTO: 256 K/UL (ref 130–450)
PMV BLD AUTO: 8.9 FL (ref 7–12)
POTASSIUM SERPL-SCNC: 4.3 MMOL/L (ref 3.5–5)
PROT SERPL-MCNC: 7.3 G/DL (ref 6.4–8.3)
PROT UR STRIP-MCNC: NEGATIVE MG/DL
RBC # BLD AUTO: 3.97 M/UL (ref 3.5–5.5)
RBC # BLD: ABNORMAL 10*6/UL
RBC #/AREA URNS HPF: NORMAL /HPF
SODIUM SERPL-SCNC: 140 MMOL/L (ref 132–146)
SP GR UR STRIP: 1.01 (ref 1–1.03)
TROPONIN I SERPL HS-MCNC: 17 NG/L (ref 0–9)
UROBILINOGEN UR STRIP-ACNC: 0.2 EU/DL (ref 0–1)
WBC #/AREA URNS HPF: NORMAL /HPF
WBC OTHER # BLD: 12.5 K/UL (ref 4.5–11.5)

## 2023-10-25 PROCEDURE — 82962 GLUCOSE BLOOD TEST: CPT

## 2023-10-25 PROCEDURE — 93005 ELECTROCARDIOGRAM TRACING: CPT

## 2023-10-25 PROCEDURE — 83605 ASSAY OF LACTIC ACID: CPT

## 2023-10-25 PROCEDURE — 6360000002 HC RX W HCPCS: Performed by: SURGERY

## 2023-10-25 PROCEDURE — 6360000002 HC RX W HCPCS

## 2023-10-25 PROCEDURE — 2580000003 HC RX 258

## 2023-10-25 PROCEDURE — 96365 THER/PROPH/DIAG IV INF INIT: CPT

## 2023-10-25 PROCEDURE — 85025 COMPLETE CBC W/AUTO DIFF WBC: CPT

## 2023-10-25 PROCEDURE — 81001 URINALYSIS AUTO W/SCOPE: CPT

## 2023-10-25 PROCEDURE — 96368 THER/DIAG CONCURRENT INF: CPT

## 2023-10-25 PROCEDURE — 99233 SBSQ HOSP IP/OBS HIGH 50: CPT | Performed by: INTERNAL MEDICINE

## 2023-10-25 PROCEDURE — 80053 COMPREHEN METABOLIC PANEL: CPT

## 2023-10-25 PROCEDURE — C9113 INJ PANTOPRAZOLE SODIUM, VIA: HCPCS

## 2023-10-25 PROCEDURE — 83690 ASSAY OF LIPASE: CPT

## 2023-10-25 PROCEDURE — 96375 TX/PRO/DX INJ NEW DRUG ADDON: CPT

## 2023-10-25 PROCEDURE — 86901 BLOOD TYPING SEROLOGIC RH(D): CPT

## 2023-10-25 PROCEDURE — C9113 INJ PANTOPRAZOLE SODIUM, VIA: HCPCS | Performed by: SURGERY

## 2023-10-25 PROCEDURE — 6370000000 HC RX 637 (ALT 250 FOR IP): Performed by: INTERNAL MEDICINE

## 2023-10-25 PROCEDURE — 99285 EMERGENCY DEPT VISIT HI MDM: CPT

## 2023-10-25 PROCEDURE — 99223 1ST HOSP IP/OBS HIGH 75: CPT | Performed by: SURGERY

## 2023-10-25 PROCEDURE — 96366 THER/PROPH/DIAG IV INF ADDON: CPT

## 2023-10-25 PROCEDURE — 74177 CT ABD & PELVIS W/CONTRAST: CPT

## 2023-10-25 PROCEDURE — 71045 X-RAY EXAM CHEST 1 VIEW: CPT

## 2023-10-25 PROCEDURE — 1200000000 HC SEMI PRIVATE

## 2023-10-25 PROCEDURE — 86850 RBC ANTIBODY SCREEN: CPT

## 2023-10-25 PROCEDURE — 84484 ASSAY OF TROPONIN QUANT: CPT

## 2023-10-25 PROCEDURE — 86900 BLOOD TYPING SEROLOGIC ABO: CPT

## 2023-10-25 PROCEDURE — 6360000004 HC RX CONTRAST MEDICATION: Performed by: RADIOLOGY

## 2023-10-25 PROCEDURE — 2580000003 HC RX 258: Performed by: SURGERY

## 2023-10-25 RX ORDER — SODIUM CHLORIDE, SODIUM LACTATE, POTASSIUM CHLORIDE, CALCIUM CHLORIDE 600; 310; 30; 20 MG/100ML; MG/100ML; MG/100ML; MG/100ML
INJECTION, SOLUTION INTRAVENOUS CONTINUOUS
Status: DISCONTINUED | OUTPATIENT
Start: 2023-10-25 | End: 2023-10-27

## 2023-10-25 RX ORDER — DIPHENHYDRAMINE HYDROCHLORIDE 50 MG/ML
25 INJECTION INTRAMUSCULAR; INTRAVENOUS ONCE
Status: DISCONTINUED | OUTPATIENT
Start: 2023-10-25 | End: 2023-10-25 | Stop reason: HOSPADM

## 2023-10-25 RX ORDER — METRONIDAZOLE 500 MG/1
500 TABLET ORAL ONCE
Status: DISCONTINUED | OUTPATIENT
Start: 2023-10-25 | End: 2023-10-25

## 2023-10-25 RX ORDER — SODIUM CHLORIDE 9 MG/ML
INJECTION, SOLUTION INTRAVENOUS ONCE
Status: COMPLETED | OUTPATIENT
Start: 2023-10-25 | End: 2023-10-25

## 2023-10-25 RX ORDER — FENTANYL CITRATE 50 UG/ML
25 INJECTION, SOLUTION INTRAMUSCULAR; INTRAVENOUS ONCE
Status: COMPLETED | OUTPATIENT
Start: 2023-10-25 | End: 2023-10-25

## 2023-10-25 RX ORDER — 0.9 % SODIUM CHLORIDE 0.9 %
1000 INTRAVENOUS SOLUTION INTRAVENOUS ONCE
Status: COMPLETED | OUTPATIENT
Start: 2023-10-25 | End: 2023-10-25

## 2023-10-25 RX ORDER — MORPHINE SULFATE 2 MG/ML
2 INJECTION, SOLUTION INTRAMUSCULAR; INTRAVENOUS
Status: DISCONTINUED | OUTPATIENT
Start: 2023-10-25 | End: 2023-10-28 | Stop reason: HOSPADM

## 2023-10-25 RX ORDER — METRONIDAZOLE 500 MG/100ML
500 INJECTION, SOLUTION INTRAVENOUS ONCE
Status: COMPLETED | OUTPATIENT
Start: 2023-10-25 | End: 2023-10-25

## 2023-10-25 RX ORDER — ATORVASTATIN CALCIUM 40 MG/1
40 TABLET, FILM COATED ORAL DAILY
Status: DISCONTINUED | OUTPATIENT
Start: 2023-10-25 | End: 2023-10-28 | Stop reason: HOSPADM

## 2023-10-25 RX ORDER — SODIUM CHLORIDE 0.9 % (FLUSH) 0.9 %
5-40 SYRINGE (ML) INJECTION EVERY 12 HOURS SCHEDULED
Status: DISCONTINUED | OUTPATIENT
Start: 2023-10-25 | End: 2023-10-28 | Stop reason: HOSPADM

## 2023-10-25 RX ORDER — MORPHINE SULFATE 4 MG/ML
4 INJECTION, SOLUTION INTRAMUSCULAR; INTRAVENOUS
Status: DISCONTINUED | OUTPATIENT
Start: 2023-10-25 | End: 2023-10-28 | Stop reason: HOSPADM

## 2023-10-25 RX ORDER — ONDANSETRON 2 MG/ML
4 INJECTION INTRAMUSCULAR; INTRAVENOUS ONCE
Status: COMPLETED | OUTPATIENT
Start: 2023-10-25 | End: 2023-10-25

## 2023-10-25 RX ORDER — PROCHLORPERAZINE EDISYLATE 5 MG/ML
10 INJECTION INTRAMUSCULAR; INTRAVENOUS EVERY 6 HOURS PRN
Status: DISCONTINUED | OUTPATIENT
Start: 2023-10-25 | End: 2023-10-28 | Stop reason: HOSPADM

## 2023-10-25 RX ORDER — FLUCONAZOLE 2 MG/ML
400 INJECTION, SOLUTION INTRAVENOUS EVERY 24 HOURS
Status: DISCONTINUED | OUTPATIENT
Start: 2023-10-25 | End: 2023-10-28 | Stop reason: HOSPADM

## 2023-10-25 RX ORDER — METRONIDAZOLE 500 MG/100ML
500 INJECTION, SOLUTION INTRAVENOUS EVERY 8 HOURS
Status: DISCONTINUED | OUTPATIENT
Start: 2023-10-25 | End: 2023-10-28 | Stop reason: HOSPADM

## 2023-10-25 RX ORDER — MORPHINE SULFATE 2 MG/ML
2 INJECTION, SOLUTION INTRAMUSCULAR; INTRAVENOUS ONCE
Status: COMPLETED | OUTPATIENT
Start: 2023-10-25 | End: 2023-10-25

## 2023-10-25 RX ORDER — PANTOPRAZOLE SODIUM 40 MG/10ML
80 INJECTION, POWDER, LYOPHILIZED, FOR SOLUTION INTRAVENOUS ONCE
Status: COMPLETED | OUTPATIENT
Start: 2023-10-25 | End: 2023-10-25

## 2023-10-25 RX ORDER — MECOBALAMIN 5000 MCG
10 TABLET,DISINTEGRATING ORAL NIGHTLY PRN
Status: DISCONTINUED | OUTPATIENT
Start: 2023-10-25 | End: 2023-10-28 | Stop reason: HOSPADM

## 2023-10-25 RX ORDER — SODIUM CHLORIDE 9 MG/ML
INJECTION, SOLUTION INTRAVENOUS PRN
Status: DISCONTINUED | OUTPATIENT
Start: 2023-10-25 | End: 2023-10-28 | Stop reason: HOSPADM

## 2023-10-25 RX ORDER — SODIUM CHLORIDE 0.9 % (FLUSH) 0.9 %
5-40 SYRINGE (ML) INJECTION PRN
Status: DISCONTINUED | OUTPATIENT
Start: 2023-10-25 | End: 2023-10-28 | Stop reason: HOSPADM

## 2023-10-25 RX ORDER — ONDANSETRON 2 MG/ML
4 INJECTION INTRAMUSCULAR; INTRAVENOUS EVERY 6 HOURS PRN
Status: DISCONTINUED | OUTPATIENT
Start: 2023-10-25 | End: 2023-10-28 | Stop reason: HOSPADM

## 2023-10-25 RX ORDER — ENOXAPARIN SODIUM 100 MG/ML
40 INJECTION SUBCUTANEOUS DAILY
Status: DISCONTINUED | OUTPATIENT
Start: 2023-10-25 | End: 2023-10-28 | Stop reason: HOSPADM

## 2023-10-25 RX ADMIN — MORPHINE SULFATE 2 MG: 2 INJECTION, SOLUTION INTRAMUSCULAR; INTRAVENOUS at 18:57

## 2023-10-25 RX ADMIN — PANTOPRAZOLE SODIUM 80 MG: 40 INJECTION, POWDER, FOR SOLUTION INTRAVENOUS at 10:29

## 2023-10-25 RX ADMIN — FLUCONAZOLE 400 MG: 400 INJECTION, SOLUTION INTRAVENOUS at 18:18

## 2023-10-25 RX ADMIN — ONDANSETRON 4 MG: 2 INJECTION INTRAMUSCULAR; INTRAVENOUS at 12:08

## 2023-10-25 RX ADMIN — WATER 1000 MG: 1 INJECTION INTRAMUSCULAR; INTRAVENOUS; SUBCUTANEOUS at 18:07

## 2023-10-25 RX ADMIN — SODIUM CHLORIDE 1000 ML: 9 INJECTION, SOLUTION INTRAVENOUS at 10:28

## 2023-10-25 RX ADMIN — PANTOPRAZOLE SODIUM 8 MG/HR: 40 INJECTION, POWDER, FOR SOLUTION INTRAVENOUS at 18:25

## 2023-10-25 RX ADMIN — METRONIDAZOLE 500 MG: 500 INJECTION, SOLUTION INTRAVENOUS at 18:21

## 2023-10-25 RX ADMIN — ENOXAPARIN SODIUM 40 MG: 100 INJECTION SUBCUTANEOUS at 18:27

## 2023-10-25 RX ADMIN — CEFEPIME 2000 MG: 2 INJECTION, POWDER, FOR SOLUTION INTRAVENOUS at 10:29

## 2023-10-25 RX ADMIN — ATORVASTATIN CALCIUM 40 MG: 40 TABLET, FILM COATED ORAL at 18:57

## 2023-10-25 RX ADMIN — IOPAMIDOL 18 ML: 755 INJECTION, SOLUTION INTRAVENOUS at 10:04

## 2023-10-25 RX ADMIN — METRONIDAZOLE 500 MG: 500 INJECTION, SOLUTION INTRAVENOUS at 11:15

## 2023-10-25 RX ADMIN — HYDROMORPHONE HYDROCHLORIDE 0.5 MG: 1 INJECTION, SOLUTION INTRAMUSCULAR; INTRAVENOUS; SUBCUTANEOUS at 12:08

## 2023-10-25 RX ADMIN — IOPAMIDOL 75 ML: 755 INJECTION, SOLUTION INTRAVENOUS at 10:04

## 2023-10-25 RX ADMIN — FENTANYL CITRATE 25 MCG: 50 INJECTION, SOLUTION INTRAMUSCULAR; INTRAVENOUS at 08:53

## 2023-10-25 RX ADMIN — SODIUM CHLORIDE: 9 INJECTION, SOLUTION INTRAVENOUS at 12:15

## 2023-10-25 RX ADMIN — SODIUM CHLORIDE, POTASSIUM CHLORIDE, SODIUM LACTATE AND CALCIUM CHLORIDE: 600; 310; 30; 20 INJECTION, SOLUTION INTRAVENOUS at 18:17

## 2023-10-25 RX ADMIN — MORPHINE SULFATE 2 MG: 2 INJECTION, SOLUTION INTRAMUSCULAR; INTRAVENOUS at 11:10

## 2023-10-25 ASSESSMENT — LIFESTYLE VARIABLES
HOW MANY STANDARD DRINKS CONTAINING ALCOHOL DO YOU HAVE ON A TYPICAL DAY: PATIENT DOES NOT DRINK
HOW OFTEN DO YOU HAVE A DRINK CONTAINING ALCOHOL: NEVER
HOW MANY STANDARD DRINKS CONTAINING ALCOHOL DO YOU HAVE ON A TYPICAL DAY: 1 OR 2
HOW OFTEN DO YOU HAVE A DRINK CONTAINING ALCOHOL: MONTHLY OR LESS

## 2023-10-25 ASSESSMENT — PAIN DESCRIPTION - LOCATION
LOCATION: ABDOMEN

## 2023-10-25 ASSESSMENT — PAIN DESCRIPTION - DESCRIPTORS
DESCRIPTORS: SHARP
DESCRIPTORS: ACHING;DISCOMFORT;SORE
DESCRIPTORS: ACHING;CRAMPING;DISCOMFORT
DESCRIPTORS: STABBING

## 2023-10-25 ASSESSMENT — PAIN SCALES - GENERAL
PAINLEVEL_OUTOF10: 2
PAINLEVEL_OUTOF10: 6
PAINLEVEL_OUTOF10: 2
PAINLEVEL_OUTOF10: 3
PAINLEVEL_OUTOF10: 4
PAINLEVEL_OUTOF10: 7

## 2023-10-25 ASSESSMENT — PAIN - FUNCTIONAL ASSESSMENT
PAIN_FUNCTIONAL_ASSESSMENT: PREVENTS OR INTERFERES SOME ACTIVE ACTIVITIES AND ADLS
PAIN_FUNCTIONAL_ASSESSMENT: ACTIVITIES ARE NOT PREVENTED

## 2023-10-25 ASSESSMENT — PAIN DESCRIPTION - FREQUENCY
FREQUENCY: INTERMITTENT
FREQUENCY: INTERMITTENT

## 2023-10-25 ASSESSMENT — PAIN DESCRIPTION - ORIENTATION
ORIENTATION: INNER
ORIENTATION: RIGHT;LEFT
ORIENTATION: RIGHT;LEFT

## 2023-10-25 ASSESSMENT — PAIN DESCRIPTION - PAIN TYPE
TYPE: ACUTE PAIN
TYPE: ACUTE PAIN

## 2023-10-25 ASSESSMENT — PAIN DESCRIPTION - ONSET
ONSET: GRADUAL
ONSET: GRADUAL

## 2023-10-25 NOTE — ED NOTES
Nurse to nurse given to [facility] at receiving facility     Anny Stroud RN  10/25/23 2725

## 2023-10-25 NOTE — PROGRESS NOTES
@1011 access center notified of transfer to [facility] intermediate  Perforated viscus  Dr. Sly Barba spoke with Dr. Yoel Phillips requesting transfer  @4091 access center renotifed of transfer to contact hospitalist  @3705 Dr Héctor Rose spoke with Dr. Edda Pimentel hospitalist/ accepting transfer  @9670 PA ETA 90 min to 2 hour @6004

## 2023-10-25 NOTE — CONSULTS
[facility]      Reason for Consult:  Medical management    History of Present Illness:  79 y.o. female with a history of gastric bypass chronic back pain diet controlled diabetes after weight loss hyperlipidemia hypertension controlled by diet after weight loss spinal stenosis presents with abdominal pain on her left side that began suddenly this morning worse with deep inspiration she came to the [facility] emergency room a did imaging showing Pneumoperitoneum thus bowel perforation cannot be excluded. The transfer line called me accepted the patient and I spoke to Dr. Yoel Phillips who later clarified that he actually excepted the patient onto his service thus I am on for medical consultation    Informant(s) for H&P: Chart Dr. Yoel Phillips ER doctor patient    REVIEW OF SYSTEMS:  Complete ROS performed with patient, pertinent positives and negatives are listed in the HPI.     PMH:  Past Medical History:   Diagnosis Date    Arthritis     Chronic back pain     Chronic hip pain     Diabetes mellitus (HCC)     no longer on meds since wt loss surgery    Hyperlipidemia     Hypertension     no meds since weight loss surgery    Nausea & vomiting     PONV (postoperative nausea and vomiting)     Ringing in ears     Spinal stenosis        Surgical History:  Past Surgical History:   Procedure Laterality Date    CARPAL TUNNEL RELEASE Bilateral 2007    Dr. Benson Second  08/2014    Laparoscopic    COLONOSCOPY  2007    HYSTERECTOMY (CERVIX STATUS UNKNOWN)  2005,Dr. Velasquez,[place] [facility]    NERVE BLOCK Right 08/22/2022    RIGHT SACROILIAC JOINT INJECTION UNDER FLUOROSCOPY performed by Leslie Belle MD at [address] [place] [address] Right 10/06/2022    #2 RIGHT SACROILIAC JOINT INJECTION UNDER FLUOROSCOPY performed by Leslie Belle MD at [address] [place] [address] Right 4/24/2023    RIGHT SACROILIAC JOINT INJECTION UNDER

## 2023-10-25 NOTE — ED PROVIDER NOTES
Troponin   Result Value Ref Range    Troponin, High Sensitivity 17 (H) 0 - 9 ng/L   Microscopic Urinalysis   Result Value Ref Range    WBC, UA 0 TO 5 0 TO 5 /HPF    RBC, UA 0 TO 2 0 TO 2 /HPF   EKG 12 Lead   Result Value Ref Range    Ventricular Rate 92 BPM    Atrial Rate 92 BPM    P-R Interval 118 ms    QRS Duration 92 ms    Q-T Interval 376 ms    QTc Calculation (Bazett) 464 ms    P Axis 50 degrees    R Axis -13 degrees    T Axis 82 degrees   TYPE AND SCREEN   Result Value Ref Range    Blood Bank Sample Expiration 10/28/2023,2359     Arm Band Number [id number]     ABO/Rh A POSITIVE     Antibody Screen NEGATIVE        RADIOLOGY:   Non-plain film images such as CT, Ultrasound and MRI are read by the radiologist. Plain radiographic images are visualized and preliminarily interpreted by the ED Provider with the below findings:    CXR with air under the diaphragm, pneumoperitoneum. No obvious effusions or consolidations concerning pna, no ptx       Interpretation per the Radiologist below, if available at the time of this note:    CT ABDOMEN PELVIS W IV CONTRAST Additional Contrast? Oral   Final Result   Pneumoperitoneum which may be secondary to recent abdominal surgery however   bowel perforation cannot be excluded. Clinical correlation is required. Status post gastric bypass procedure with left upper quadrant inflammatory   changes. This may be secondary to recent surgery however enteritis/colitis   is another possibility. Oral contrast was administered for this examination   and there is no evidence of oral contrast extravasation. Bowel wall thickening distal stomach/gastric antrum. Gastritis/duodenitis is   suggested. Ulcer at this location is probably not likely as there is no   adjacent inflammatory change or extraluminal gas specifically at that   location. Bilateral renal cysts. No follow-up imaging for these is recommended. Hepatic steatosis.       Minimal pelvic ascites, probably

## 2023-10-25 NOTE — ED NOTES
Radiology Procedure Waiver   Name: Armando Driver  : 1955  MRN: [id number]    Date:  10/25/23    Time: 9:24 AM EDT    Benefits of immediately proceeding with Radiology exam(s) without pre-testing outweigh the risks or are not indicated as specified below and therefore the following is/are being waived:    [] Pregnancy test   [] Patients LMP on-time and regular.   [] Patient had Tubal Ligation or has other Contraception Device. [] Patient  is Menopausal or Premenarcheal.    [] Patient had Full or Partial Hysterectomy. [] Protocol for Iodine allergy    [] MRI Questionnaire     [x] BUN/Creatinine   [] Patient age w/no hx of renal dysfunction. [] Patient on Dialysis. [] Recent Normal Labs.   Electronically signed by Burak Oh MD on 10/25/23 at 9:24 AM EDT   Urgent scan required            Burak Oh MD  Resident  10/25/23 2736

## 2023-10-25 NOTE — PLAN OF CARE
Problem: Discharge Planning  Goal: Discharge to home or other facility with appropriate resources  Outcome: Progressing  Flowsheets (Taken 10/25/2023 1603)  Discharge to home or other facility with appropriate resources: Identify barriers to discharge with patient and caregiver     Problem: Safety - Adult  Goal: Free from fall injury  Outcome: Progressing     Problem: ABCDS Injury Assessment  Goal: Absence of physical injury  Outcome: Progressing     Problem: Pain  Goal: Verbalizes/displays adequate comfort level or baseline comfort level  Outcome: Progressing

## 2023-10-26 LAB
ALBUMIN SERPL-MCNC: 3.7 G/DL (ref 3.5–5.2)
ALP SERPL-CCNC: 132 U/L (ref 35–104)
ALT SERPL-CCNC: 652 U/L (ref 0–32)
ANION GAP SERPL CALCULATED.3IONS-SCNC: 10 MMOL/L (ref 7–16)
AST SERPL-CCNC: 619 U/L (ref 0–31)
BASOPHILS # BLD: 0.02 K/UL (ref 0–0.2)
BASOPHILS NFR BLD: 0 % (ref 0–2)
BILIRUB SERPL-MCNC: 1.2 MG/DL (ref 0–1.2)
BUN SERPL-MCNC: 21 MG/DL (ref 6–23)
CALCIUM SERPL-MCNC: 8.7 MG/DL (ref 8.6–10.2)
CHLORIDE SERPL-SCNC: 103 MMOL/L (ref 98–107)
CO2 SERPL-SCNC: 24 MMOL/L (ref 22–29)
CREAT SERPL-MCNC: 1.1 MG/DL (ref 0.5–1)
EKG ATRIAL RATE: 92 BPM
EKG P AXIS: 50 DEGREES
EKG P-R INTERVAL: 118 MS
EKG Q-T INTERVAL: 376 MS
EKG QRS DURATION: 92 MS
EKG QTC CALCULATION (BAZETT): 464 MS
EKG R AXIS: -13 DEGREES
EKG T AXIS: 82 DEGREES
EKG VENTRICULAR RATE: 92 BPM
EOSINOPHIL # BLD: 0.02 K/UL (ref 0.05–0.5)
EOSINOPHILS RELATIVE PERCENT: 0 % (ref 0–6)
ERYTHROCYTE [DISTWIDTH] IN BLOOD BY AUTOMATED COUNT: 14.1 % (ref 11.5–15)
GFR SERPL CREATININE-BSD FRML MDRD: 54 ML/MIN/1.73M2
GLUCOSE BLD-MCNC: 281 MG/DL (ref 74–99)
GLUCOSE SERPL-MCNC: 121 MG/DL (ref 74–99)
HCT VFR BLD AUTO: 32.8 % (ref 34–48)
HGB BLD-MCNC: 10.6 G/DL (ref 11.5–15.5)
IMM GRANULOCYTES # BLD AUTO: 0.06 K/UL (ref 0–0.58)
IMM GRANULOCYTES NFR BLD: 1 % (ref 0–5)
LYMPHOCYTES NFR BLD: 1.27 K/UL (ref 1.5–4)
LYMPHOCYTES RELATIVE PERCENT: 11 % (ref 20–42)
MCH RBC QN AUTO: 31.2 PG (ref 26–35)
MCHC RBC AUTO-ENTMCNC: 32.3 G/DL (ref 32–34.5)
MCV RBC AUTO: 96.5 FL (ref 80–99.9)
MONOCYTES NFR BLD: 0.75 K/UL (ref 0.1–0.95)
MONOCYTES NFR BLD: 7 % (ref 2–12)
NEUTROPHILS NFR BLD: 81 % (ref 43–80)
NEUTS SEG NFR BLD: 9.27 K/UL (ref 1.8–7.3)
PLATELET # BLD AUTO: 239 K/UL (ref 130–450)
PMV BLD AUTO: 9.4 FL (ref 7–12)
POTASSIUM SERPL-SCNC: 4.4 MMOL/L (ref 3.5–5)
PROT SERPL-MCNC: 6.3 G/DL (ref 6.4–8.3)
RBC # BLD AUTO: 3.4 M/UL (ref 3.5–5.5)
SODIUM SERPL-SCNC: 137 MMOL/L (ref 132–146)
WBC OTHER # BLD: 11.4 K/UL (ref 4.5–11.5)

## 2023-10-26 PROCEDURE — 85025 COMPLETE CBC W/AUTO DIFF WBC: CPT

## 2023-10-26 PROCEDURE — 36415 COLL VENOUS BLD VENIPUNCTURE: CPT

## 2023-10-26 PROCEDURE — 6370000000 HC RX 637 (ALT 250 FOR IP): Performed by: INTERNAL MEDICINE

## 2023-10-26 PROCEDURE — 99232 SBSQ HOSP IP/OBS MODERATE 35: CPT | Performed by: INTERNAL MEDICINE

## 2023-10-26 PROCEDURE — C9113 INJ PANTOPRAZOLE SODIUM, VIA: HCPCS | Performed by: SURGERY

## 2023-10-26 PROCEDURE — 2580000003 HC RX 258: Performed by: SURGERY

## 2023-10-26 PROCEDURE — 99231 SBSQ HOSP IP/OBS SF/LOW 25: CPT | Performed by: SURGERY

## 2023-10-26 PROCEDURE — 6360000002 HC RX W HCPCS: Performed by: SURGERY

## 2023-10-26 PROCEDURE — 6370000000 HC RX 637 (ALT 250 FOR IP): Performed by: SURGERY

## 2023-10-26 PROCEDURE — 93010 ELECTROCARDIOGRAM REPORT: CPT | Performed by: INTERNAL MEDICINE

## 2023-10-26 PROCEDURE — 1200000000 HC SEMI PRIVATE

## 2023-10-26 PROCEDURE — 80053 COMPREHEN METABOLIC PANEL: CPT

## 2023-10-26 RX ORDER — SUCRALFATE 1 G/1
0.5 TABLET ORAL EVERY 4 HOURS
Status: DISCONTINUED | OUTPATIENT
Start: 2023-10-26 | End: 2023-10-28 | Stop reason: HOSPADM

## 2023-10-26 RX ORDER — ACETAMINOPHEN 325 MG/1
650 TABLET ORAL EVERY 6 HOURS PRN
Status: DISCONTINUED | OUTPATIENT
Start: 2023-10-26 | End: 2023-10-28 | Stop reason: HOSPADM

## 2023-10-26 RX ADMIN — SUCRALFATE 0.5 G: 1 TABLET ORAL at 09:44

## 2023-10-26 RX ADMIN — SUCRALFATE 0.5 G: 1 TABLET ORAL at 16:41

## 2023-10-26 RX ADMIN — SODIUM CHLORIDE, POTASSIUM CHLORIDE, SODIUM LACTATE AND CALCIUM CHLORIDE: 600; 310; 30; 20 INJECTION, SOLUTION INTRAVENOUS at 13:18

## 2023-10-26 RX ADMIN — METRONIDAZOLE 500 MG: 500 INJECTION, SOLUTION INTRAVENOUS at 16:43

## 2023-10-26 RX ADMIN — SODIUM CHLORIDE, POTASSIUM CHLORIDE, SODIUM LACTATE AND CALCIUM CHLORIDE: 600; 310; 30; 20 INJECTION, SOLUTION INTRAVENOUS at 05:10

## 2023-10-26 RX ADMIN — PANTOPRAZOLE SODIUM 8 MG/HR: 40 INJECTION, POWDER, FOR SOLUTION INTRAVENOUS at 05:03

## 2023-10-26 RX ADMIN — ACETAMINOPHEN 650 MG: 325 TABLET ORAL at 12:59

## 2023-10-26 RX ADMIN — FLUCONAZOLE 400 MG: 400 INJECTION, SOLUTION INTRAVENOUS at 16:45

## 2023-10-26 RX ADMIN — SUCRALFATE 0.5 G: 1 TABLET ORAL at 12:22

## 2023-10-26 RX ADMIN — METRONIDAZOLE 500 MG: 500 INJECTION, SOLUTION INTRAVENOUS at 09:59

## 2023-10-26 RX ADMIN — PANTOPRAZOLE SODIUM 8 MG/HR: 40 INJECTION, POWDER, FOR SOLUTION INTRAVENOUS at 13:55

## 2023-10-26 RX ADMIN — MORPHINE SULFATE 2 MG: 2 INJECTION, SOLUTION INTRAMUSCULAR; INTRAVENOUS at 01:13

## 2023-10-26 RX ADMIN — WATER 1000 MG: 1 INJECTION INTRAMUSCULAR; INTRAVENOUS; SUBCUTANEOUS at 09:45

## 2023-10-26 RX ADMIN — ATORVASTATIN CALCIUM 40 MG: 40 TABLET, FILM COATED ORAL at 09:44

## 2023-10-26 RX ADMIN — SUCRALFATE 0.5 G: 1 TABLET ORAL at 20:53

## 2023-10-26 RX ADMIN — SODIUM CHLORIDE, POTASSIUM CHLORIDE, SODIUM LACTATE AND CALCIUM CHLORIDE: 600; 310; 30; 20 INJECTION, SOLUTION INTRAVENOUS at 22:51

## 2023-10-26 RX ADMIN — ENOXAPARIN SODIUM 40 MG: 100 INJECTION SUBCUTANEOUS at 09:45

## 2023-10-26 RX ADMIN — ONDANSETRON 4 MG: 2 INJECTION INTRAMUSCULAR; INTRAVENOUS at 01:15

## 2023-10-26 RX ADMIN — MORPHINE SULFATE 2 MG: 2 INJECTION, SOLUTION INTRAMUSCULAR; INTRAVENOUS at 05:04

## 2023-10-26 RX ADMIN — METRONIDAZOLE 500 MG: 500 INJECTION, SOLUTION INTRAVENOUS at 01:05

## 2023-10-26 ASSESSMENT — PAIN - FUNCTIONAL ASSESSMENT: PAIN_FUNCTIONAL_ASSESSMENT: ACTIVITIES ARE NOT PREVENTED

## 2023-10-26 ASSESSMENT — PAIN SCALES - GENERAL
PAINLEVEL_OUTOF10: 5
PAINLEVEL_OUTOF10: 0
PAINLEVEL_OUTOF10: 7
PAINLEVEL_OUTOF10: 0
PAINLEVEL_OUTOF10: 2
PAINLEVEL_OUTOF10: 3
PAINLEVEL_OUTOF10: 2

## 2023-10-26 ASSESSMENT — PAIN DESCRIPTION - ONSET: ONSET: GRADUAL

## 2023-10-26 ASSESSMENT — PAIN DESCRIPTION - LOCATION
LOCATION: ABDOMEN
LOCATION: ABDOMEN
LOCATION: HEAD

## 2023-10-26 ASSESSMENT — PAIN DESCRIPTION - ORIENTATION
ORIENTATION: MID;UPPER
ORIENTATION: MID;UPPER
ORIENTATION: INNER

## 2023-10-26 ASSESSMENT — PAIN SCALES - WONG BAKER
WONGBAKER_NUMERICALRESPONSE: 2
WONGBAKER_NUMERICALRESPONSE: 2;0
WONGBAKER_NUMERICALRESPONSE: 0

## 2023-10-26 ASSESSMENT — PAIN DESCRIPTION - FREQUENCY: FREQUENCY: INTERMITTENT

## 2023-10-26 ASSESSMENT — PAIN DESCRIPTION - DESCRIPTORS
DESCRIPTORS: ACHING;DISCOMFORT;TIGHTNESS;SORE
DESCRIPTORS: ACHING;DISCOMFORT;TIGHTNESS
DESCRIPTORS: ACHING;DISCOMFORT;SORE

## 2023-10-26 ASSESSMENT — PAIN DESCRIPTION - PAIN TYPE: TYPE: ACUTE PAIN

## 2023-10-26 NOTE — CARE COORDINATION
Case Management Assessment  Initial Evaluation    Date/Time of Evaluation: 10/26/2023 10:17 AM  Assessment Completed by: Nicholas Reina RN    If patient is discharged prior to next notation, then this note serves as note for discharge by case management. Patient Name: Jaime Boyle                   YOB: 1955  Diagnosis: Perforated ulcer ([address]) [K27.5]  Perforated bowel ([address]) [K63.1]                   Date / Time: 10/25/2023  3:23 PM    Patient Admission Status: Inpatient   Readmission Risk (Low < 19, Mod (19-27), High > 27): Readmission Risk Score: 10.5    Current PCP: Isela Mccray MD  PCP verified by CM? Yes    Chart Reviewed: Yes      History Provided by: Patient, Medical Record  Patient Orientation: Alert and Oriented, Person, Place, Situation    Patient Cognition: Alert    Hospitalization in the last 30 days (Readmission):  No    If yes, Readmission Assessment in CM Navigator will be completed. Advance Directives:      Code Status: Full Code   Patient's Primary Decision Maker is: Named in Scanned ACP Document    Primary Decision Maker: Luis Mcnair - Brother/Sister - [phone]    Secondary Decision Maker: Charles Zuñiga - Brother/Sister - [phone]    Discharge Planning:    Patient lives with: Family Members Type of Home: House  Primary Care Giver: Self  Patient Support Systems include: Family Members     Current services prior to admission: None              Type of Home Care services:  None    ADLS  Prior functional level: Independent in ADLs/IADLs  Current functional level: Independent in ADLs/IADLs      Family can provide assistance at DC: Yes  Would you like Case Management to discuss the discharge plan with any other family members/significant others, and if so, who?  No  Plans to Return to Present Housing: Yes    Potential Assistance needed at discharge: N/A    Patient expects to discharge to: [address] [place] [address] for transportation at discharge: family    Financial    Payor: MEDICARE / Plan: MEDICARE PART A AND B / Product Type: *No Product type* /         Potential assistance Purchasing Medications: No  Meds-to-Beds request:        [id number] [place], [address]  [address] [facility] [address]  Phone: [phone] Fax: [phone]    [id number], [address]  [address] [address]  Phone: [phone] Fax: [phone]      Notes:    Factors facilitating achievement of predicted outcomes: Family support    Barriers to discharge: Medication managment    Additional Case Management Notes: attempted to speak with patient re;transition of care planning, no answer on her cell phone, left message. Chart reviewed and no discharge needs have been identified at this time. Pt was transferred from [facility] ER for further evaluation/treatment by general surgery. Pt hx of gastric bypass in 2014 now with suspected ulcer of the GJ junction. Currently on IV Protonix drip and several IV antibiotics. Per surgery note plan is to treat with Protonix drip for two days then trial a diet. Will follow for transition to oral meds. Plan at this time is for patient to return home when medically appropriate for discharge.     The Plan for Transition of Care is related to the following treatment goals of Perforated ulcer ([address]) [K27.5]  Perforated bowel ([address]) [K63.1]      Sindy William RN  Case Management Department  Ph: [phone]

## 2023-10-26 NOTE — PLAN OF CARE
Problem: Discharge Planning  Goal: Discharge to home or other facility with appropriate resources  10/26/2023 1418 by Dara Hunter RN  Outcome: Progressing     Problem: Safety - Adult  Goal: Free from fall injury  10/26/2023 1418 by Dara Hunter RN  Outcome: Progressing     Problem: ABCDS Injury Assessment  Goal: Absence of physical injury  10/26/2023 1418 by Dara Hunter RN  Outcome: Progressing     Problem: Pain  Goal: Verbalizes/displays adequate comfort level or baseline comfort level  10/26/2023 1418 by Dara Hunter RN  Outcome: Progressing     Problem: Chronic Conditions and Co-morbidities  Goal: Patient's chronic conditions and co-morbidity symptoms are monitored and maintained or improved  Outcome: Progressing

## 2023-10-26 NOTE — ACP (ADVANCE CARE PLANNING)
Advance Care Planning   Healthcare Decision Maker:    Primary Decision Maker: Luis Mcnair - Brother/Sister - [phone]    Secondary Decision Maker: Oleg Masterson - Brother/Sister - [phone]      Today we documented Decision Maker(s) consistent with ACP documents on file.

## 2023-10-26 NOTE — PLAN OF CARE
Problem: Discharge Planning  Goal: Discharge to home or other facility with appropriate resources  10/26/2023 0229 by Robyn Avila RN  Outcome: Progressing  10/25/2023 1924 by Lachelle Winters RN  Outcome: Progressing  Flowsheets (Taken 10/25/2023 1603)  Discharge to home or other facility with appropriate resources: Identify barriers to discharge with patient and caregiver     Problem: Safety - Adult  Goal: Free from fall injury  10/26/2023 0229 by Robyn Avila RN  Outcome: Progressing  10/25/2023 1924 by Lachelle Winters RN  Outcome: Progressing     Problem: ABCDS Injury Assessment  Goal: Absence of physical injury  10/26/2023 0229 by Robyn Avila RN  Outcome: Progressing  10/25/2023 1924 by Lachelle Winters RN  Outcome: Progressing     Problem: Pain  Goal: Verbalizes/displays adequate comfort level or baseline comfort level  10/26/2023 0229 by Robyn Avila RN  Outcome: Progressing  10/25/2023 1924 by Lachelle Winters RN  Outcome: Progressing

## 2023-10-27 PROCEDURE — 2580000003 HC RX 258: Performed by: SURGERY

## 2023-10-27 PROCEDURE — 6370000000 HC RX 637 (ALT 250 FOR IP): Performed by: INTERNAL MEDICINE

## 2023-10-27 PROCEDURE — C9113 INJ PANTOPRAZOLE SODIUM, VIA: HCPCS | Performed by: SURGERY

## 2023-10-27 PROCEDURE — 1200000000 HC SEMI PRIVATE

## 2023-10-27 PROCEDURE — 6360000002 HC RX W HCPCS: Performed by: SURGERY

## 2023-10-27 PROCEDURE — 6370000000 HC RX 637 (ALT 250 FOR IP): Performed by: SURGERY

## 2023-10-27 PROCEDURE — A4216 STERILE WATER/SALINE, 10 ML: HCPCS | Performed by: SURGERY

## 2023-10-27 PROCEDURE — 99232 SBSQ HOSP IP/OBS MODERATE 35: CPT | Performed by: INTERNAL MEDICINE

## 2023-10-27 RX ADMIN — SODIUM CHLORIDE, PRESERVATIVE FREE 40 MG: 5 INJECTION INTRAVENOUS at 16:24

## 2023-10-27 RX ADMIN — FLUCONAZOLE 400 MG: 400 INJECTION, SOLUTION INTRAVENOUS at 17:30

## 2023-10-27 RX ADMIN — SUCRALFATE 0.5 G: 1 TABLET ORAL at 18:25

## 2023-10-27 RX ADMIN — METRONIDAZOLE 500 MG: 500 INJECTION, SOLUTION INTRAVENOUS at 00:42

## 2023-10-27 RX ADMIN — SUCRALFATE 0.5 G: 1 TABLET ORAL at 00:42

## 2023-10-27 RX ADMIN — SUCRALFATE 0.5 G: 1 TABLET ORAL at 10:07

## 2023-10-27 RX ADMIN — SUCRALFATE 0.5 G: 1 TABLET ORAL at 12:50

## 2023-10-27 RX ADMIN — ATORVASTATIN CALCIUM 40 MG: 40 TABLET, FILM COATED ORAL at 10:07

## 2023-10-27 RX ADMIN — METRONIDAZOLE 500 MG: 500 INJECTION, SOLUTION INTRAVENOUS at 18:01

## 2023-10-27 RX ADMIN — SODIUM CHLORIDE, PRESERVATIVE FREE 10 ML: 5 INJECTION INTRAVENOUS at 20:55

## 2023-10-27 RX ADMIN — PANTOPRAZOLE SODIUM 8 MG/HR: 40 INJECTION, POWDER, FOR SOLUTION INTRAVENOUS at 00:01

## 2023-10-27 RX ADMIN — ACETAMINOPHEN 650 MG: 325 TABLET ORAL at 17:57

## 2023-10-27 RX ADMIN — SUCRALFATE 0.5 G: 1 TABLET ORAL at 05:40

## 2023-10-27 RX ADMIN — WATER 1000 MG: 1 INJECTION INTRAMUSCULAR; INTRAVENOUS; SUBCUTANEOUS at 09:30

## 2023-10-27 RX ADMIN — ENOXAPARIN SODIUM 40 MG: 100 INJECTION SUBCUTANEOUS at 09:30

## 2023-10-27 RX ADMIN — METRONIDAZOLE 500 MG: 500 INJECTION, SOLUTION INTRAVENOUS at 09:30

## 2023-10-27 RX ADMIN — SUCRALFATE 0.5 G: 1 TABLET ORAL at 20:55

## 2023-10-27 ASSESSMENT — PAIN - FUNCTIONAL ASSESSMENT: PAIN_FUNCTIONAL_ASSESSMENT: ACTIVITIES ARE NOT PREVENTED

## 2023-10-27 ASSESSMENT — PAIN SCALES - GENERAL
PAINLEVEL_OUTOF10: 0
PAINLEVEL_OUTOF10: 4

## 2023-10-27 ASSESSMENT — PAIN DESCRIPTION - DESCRIPTORS: DESCRIPTORS: ACHING

## 2023-10-27 ASSESSMENT — PAIN DESCRIPTION - LOCATION: LOCATION: HEAD

## 2023-10-27 ASSESSMENT — PAIN DESCRIPTION - ORIENTATION: ORIENTATION: MID

## 2023-10-27 NOTE — PLAN OF CARE
Problem: Safety - Adult  Goal: Free from fall injury  10/26/2023 2211 by Aidan Green RN  Outcome: Progressing     Problem: ABCDS Injury Assessment  Goal: Absence of physical injury  10/26/2023 2211 by Aidan Green RN  Outcome: Progressing     Problem: Pain  Goal: Verbalizes/displays adequate comfort level or baseline comfort level  10/26/2023 2211 by Aidan Green RN  Outcome: Progressing     Problem: Chronic Conditions and Co-morbidities  Goal: Patient's chronic conditions and co-morbidity symptoms are monitored and maintained or improved  10/26/2023 2211 by Aidan Green RN  Outcome: Progressing

## 2023-10-27 NOTE — DISCHARGE INSTRUCTIONS
Your information:  Name: Cody Ely  : 1955    Your instructions:  Discharge Home    What to do after you leave the hospital:    Recommended diet:  Full Liquid diet for 2 weeks    Recommended activity: activity as tolerated    The following personal items were collected during your admission and were returned to you:    Belongings  Dental Appliances: None, Other (Comment) (dental upper right side implant)  Vision - Corrective Lenses: Eyeglasses  Hearing Aid: None  Clothing: Pants, Shirt, Jacket/Coat, At bedside  Jewelry: Earrings  Body Piercings Removed: No  Electronic Devices: None  Weapons (Notify Protective Services/Security): None  Other Valuables: At home  Home Medications: None  Valuables Given To: Patient  Provide Name(s) of Who Valuable(s) Were Given To: patient  Responsible person(s) in the waiting room: latanya Wang  Patient approves for provider to speak to responsible person post operatively: Yes    Information obtained by:  By signing below, I understand that if any problems occur once I leave the hospital I am to contact PCP. I understand and acknowledge receipt of the instructions indicated above. Peptic Ulcer Disease: Care Instructions  Overview     Peptic ulcers are sores on the inside of the stomach. Or they may be on the inside of the small intestine (such as a duodenal ulcer). They are most often caused by an infection with bacteria or from use of nonsteroidal anti-inflammatory drugs (NSAIDs). NSAIDs include aspirin, ibuprofen (Advil), and naproxen (Aleve). Your doctor may have prescribed medicine to reduce stomach acid. You also may need to take antibiotics if your peptic ulcers are caused by an infection. You can help yourself heal and keep ulcers from coming back. You can do this by making some changes in your lifestyle. Quit smoking. Limit alcohol. Follow-up care is a key part of your treatment and safety.  Be sure to make and go to all appointments, and call your

## 2023-10-28 VITALS
DIASTOLIC BLOOD PRESSURE: 60 MMHG | RESPIRATION RATE: 18 BRPM | HEART RATE: 55 BPM | WEIGHT: 156 LBS | BODY MASS INDEX: 25.99 KG/M2 | HEIGHT: 65 IN | SYSTOLIC BLOOD PRESSURE: 159 MMHG | OXYGEN SATURATION: 100 % | TEMPERATURE: 98.5 F

## 2023-10-28 PROCEDURE — 2580000003 HC RX 258: Performed by: SURGERY

## 2023-10-28 PROCEDURE — C9113 INJ PANTOPRAZOLE SODIUM, VIA: HCPCS | Performed by: SURGERY

## 2023-10-28 PROCEDURE — 6360000002 HC RX W HCPCS: Performed by: SURGERY

## 2023-10-28 PROCEDURE — 99232 SBSQ HOSP IP/OBS MODERATE 35: CPT | Performed by: INTERNAL MEDICINE

## 2023-10-28 PROCEDURE — 6370000000 HC RX 637 (ALT 250 FOR IP): Performed by: SURGERY

## 2023-10-28 PROCEDURE — 6370000000 HC RX 637 (ALT 250 FOR IP): Performed by: INTERNAL MEDICINE

## 2023-10-28 RX ORDER — METRONIDAZOLE 500 MG/1
500 TABLET ORAL 3 TIMES DAILY
Qty: 21 TABLET | Refills: 0 | Status: SHIPPED | OUTPATIENT
Start: 2023-10-28 | End: 2023-11-04

## 2023-10-28 RX ORDER — OMEPRAZOLE 20 MG/1
20 CAPSULE, DELAYED RELEASE ORAL
Qty: 60 CAPSULE | Refills: 2 | Status: SHIPPED | OUTPATIENT
Start: 2023-10-28 | End: 2023-10-28 | Stop reason: SDUPTHER

## 2023-10-28 RX ORDER — CIPROFLOXACIN 500 MG/1
500 TABLET, FILM COATED ORAL 2 TIMES DAILY
Qty: 14 TABLET | Refills: 0 | Status: SHIPPED | OUTPATIENT
Start: 2023-10-28 | End: 2023-11-04

## 2023-10-28 RX ORDER — OMEPRAZOLE 20 MG/1
40 CAPSULE, DELAYED RELEASE ORAL DAILY
Qty: 60 CAPSULE | Refills: 2 | Status: SHIPPED | OUTPATIENT
Start: 2023-10-28

## 2023-10-28 RX ORDER — SUCRALFATE 1 G/1
1 TABLET ORAL 4 TIMES DAILY
Qty: 120 TABLET | Refills: 3 | Status: SHIPPED | OUTPATIENT
Start: 2023-10-28

## 2023-10-28 RX ADMIN — METRONIDAZOLE 500 MG: 500 INJECTION, SOLUTION INTRAVENOUS at 00:53

## 2023-10-28 RX ADMIN — ACETAMINOPHEN 650 MG: 325 TABLET ORAL at 05:02

## 2023-10-28 RX ADMIN — SUCRALFATE 0.5 G: 1 TABLET ORAL at 00:50

## 2023-10-28 RX ADMIN — SUCRALFATE 0.5 G: 1 TABLET ORAL at 12:21

## 2023-10-28 RX ADMIN — SUCRALFATE 0.5 G: 1 TABLET ORAL at 10:20

## 2023-10-28 RX ADMIN — ENOXAPARIN SODIUM 40 MG: 100 INJECTION SUBCUTANEOUS at 10:19

## 2023-10-28 RX ADMIN — SUCRALFATE 0.5 G: 1 TABLET ORAL at 04:55

## 2023-10-28 RX ADMIN — METRONIDAZOLE 500 MG: 500 INJECTION, SOLUTION INTRAVENOUS at 10:22

## 2023-10-28 RX ADMIN — ATORVASTATIN CALCIUM 40 MG: 40 TABLET, FILM COATED ORAL at 10:20

## 2023-10-28 RX ADMIN — WATER 1000 MG: 1 INJECTION INTRAMUSCULAR; INTRAVENOUS; SUBCUTANEOUS at 10:25

## 2023-10-28 RX ADMIN — SODIUM CHLORIDE, PRESERVATIVE FREE 40 MG: 5 INJECTION INTRAVENOUS at 01:50

## 2023-10-28 ASSESSMENT — PAIN SCALES - GENERAL: PAINLEVEL_OUTOF10: 0

## 2023-10-28 NOTE — DISCHARGE SUMMARY
Physician Discharge Summary     Patient ID:  Justino Matute  [id number]  79 y.o.  1955    Admit date: 10/25/2023    Discharge date and time: 10/28/2023    Admitting Physician: Nettie Porter MD     Admission Diagnoses:   Patient Active Problem List   Diagnosis    Hypertension    Hyperlipidemia    Chronic back pain    Type II or unspecified type diabetes mellitus without mention of complication, not stated as uncontrolled    Arthritis    Cholecystitis with cholelithiasis    Gastric bypass     Closed nondisplaced fracture of base of fifth metacarpal bone of right hand    Impingement syndrome of right shoulder    Mild protein-calorie malnutrition ([address])    Spinal stenosis of lumbar region    DDD (degenerative disc disease), lumbar    Lumbar radicular pain    Sacroiliac dysfunction    Lumbosacral spondylosis without myelopathy    Lumbar radiculopathy    Perforated ulcer ([address])    Perforated bowel ([address])       Discharge Diagnoses:   Patient Active Problem List   Diagnosis    Hypertension    Hyperlipidemia    Chronic back pain    Type II or unspecified type diabetes mellitus without mention of complication, not stated as uncontrolled    Arthritis    Cholecystitis with cholelithiasis    Gastric bypass     Closed nondisplaced fracture of base of fifth metacarpal bone of right hand    Impingement syndrome of right shoulder    Mild protein-calorie malnutrition ([address])    Spinal stenosis of lumbar region    DDD (degenerative disc disease), lumbar    Lumbar radicular pain    Sacroiliac dysfunction    Lumbosacral spondylosis without myelopathy    Lumbar radiculopathy    Perforated ulcer ([address])    Perforated bowel ([address])       Admission Condition: good    Discharged Condition: good    Indication for Admission: perf viscous    Hospital Course: Justino Matute is a 79 y.o. female admitted with perforated marginal ulcer. She improved with abx and IVF. Surgery was not necessary.  Her diet was advanced, they were ambulating Calcium Citrate-Vitamin D (CA CITR+VIT D,CELEBRATE CALCIUM PLUS 500, TAB) 1 tablet 3 times daily      Cholecalciferol (VITAMIN D PO) Take 5,000 Units by mouth daily Bariatric             Discharge Exam:  General appearance: AAOx3, NAD  Head: NCAT, PERRLA, EOMI, red conjunctiva  Neck: supple, no masses  Lungs: Equal chest rise bilateral  Heart: Reg rate  Abdomen: soft, nondistended, nontender  Extremities: extremities normal, atraumatic, no cyanosis or edema    Disposition: home    Patient Instructions: Activity: activity as tolerated  Diet:  full liquids 2 weeks  Wound Care: none needed    Follow-up with Dr Renetta Quna in 2 weeks.     Signed:  Ary Rivero MD  10/28/2023  11:21 AM

## 2023-10-28 NOTE — PLAN OF CARE
Problem: Discharge Planning  Goal: Discharge to home or other facility with appropriate resources  10/28/2023 1300 by Shagufta Thao RN  Outcome: Adequate for Discharge     Problem: Safety - Adult  Goal: Free from fall injury  10/28/2023 1300 by Shagufta Thao RN  Outcome: Adequate for Discharge     Problem: ABCDS Injury Assessment  Goal: Absence of physical injury  10/28/2023 1300 by Shagufta Thao RN  Outcome: Adequate for Discharge     Problem: Pain  Goal: Verbalizes/displays adequate comfort level or baseline comfort level  10/28/2023 1300 by Shagufta Thao RN  Outcome: Adequate for Discharge     Problem: Chronic Conditions and Co-morbidities  Goal: Patient's chronic conditions and co-morbidity symptoms are monitored and maintained or improved  10/28/2023 1300 by Shagufta Thao RN  Outcome: Adequate for Discharge     Problem: Gastrointestinal - Adult  Goal: Maintains or returns to baseline bowel function  Outcome: Adequate for Discharge

## 2023-11-13 ENCOUNTER — OFFICE VISIT (OUTPATIENT)
Dept: SURGERY | Age: 68
End: 2023-11-13
Payer: MEDICARE

## 2023-11-13 ENCOUNTER — TELEPHONE (OUTPATIENT)
Dept: SURGERY | Age: 68
End: 2023-11-13

## 2023-11-13 VITALS
OXYGEN SATURATION: 98 % | SYSTOLIC BLOOD PRESSURE: 175 MMHG | TEMPERATURE: 97 F | HEART RATE: 78 BPM | BODY MASS INDEX: 25.96 KG/M2 | HEIGHT: 65 IN | DIASTOLIC BLOOD PRESSURE: 77 MMHG

## 2023-11-13 DIAGNOSIS — K28.9 MARGINAL ULCER: Primary | ICD-10-CM

## 2023-11-13 PROCEDURE — 1123F ACP DISCUSS/DSCN MKR DOCD: CPT | Performed by: SURGERY

## 2023-11-13 PROCEDURE — 3017F COLORECTAL CA SCREEN DOC REV: CPT | Performed by: SURGERY

## 2023-11-13 PROCEDURE — 1111F DSCHRG MED/CURRENT MED MERGE: CPT | Performed by: SURGERY

## 2023-11-13 PROCEDURE — G8399 PT W/DXA RESULTS DOCUMENT: HCPCS | Performed by: SURGERY

## 2023-11-13 PROCEDURE — G8484 FLU IMMUNIZE NO ADMIN: HCPCS | Performed by: SURGERY

## 2023-11-13 PROCEDURE — 1036F TOBACCO NON-USER: CPT | Performed by: SURGERY

## 2023-11-13 PROCEDURE — 1090F PRES/ABSN URINE INCON ASSESS: CPT | Performed by: SURGERY

## 2023-11-13 PROCEDURE — 99214 OFFICE O/P EST MOD 30 MIN: CPT | Performed by: SURGERY

## 2023-11-13 PROCEDURE — 3077F SYST BP >= 140 MM HG: CPT | Performed by: SURGERY

## 2023-11-13 PROCEDURE — G8417 CALC BMI ABV UP PARAM F/U: HCPCS | Performed by: SURGERY

## 2023-11-13 PROCEDURE — 3078F DIAST BP <80 MM HG: CPT | Performed by: SURGERY

## 2023-11-13 PROCEDURE — G8427 DOCREV CUR MEDS BY ELIG CLIN: HCPCS | Performed by: SURGERY

## 2023-11-13 NOTE — PROGRESS NOTES
General Surgery History and Physical    Patient's Name/Date of Birth: Orin Marin / 1955    Date: November 13, 2023     Surgeon: Xena Hernandez M.D.    PCP: Sarah No MD     Chief Complaint: hx of ulcer    HPI:   Orin Marin is a 79 y.o. female who presents for evaluation of hx of marginal ulcer perforation healed nonoperatively doing well now and improving without issues still on PPI and carafate.       Past Medical History:   Diagnosis Date    Arthritis     Chronic back pain     Chronic hip pain     Diabetes mellitus ([address])     no longer on meds since wt loss surgery    Hyperlipidemia     Hypertension     no meds since weight loss surgery    Nausea & vomiting     PONV (postoperative nausea and vomiting)     Ringing in ears     Spinal stenosis        Past Surgical History:   Procedure Laterality Date    CARPAL TUNNEL RELEASE Bilateral 2007    Dr. Benita Pearl  08/2014    Laparoscopic    COLONOSCOPY  2007    HYSTERECTOMY (CERVIX STATUS UNKNOWN)  2005,Dr. Velasquez,[facility]    NERVE BLOCK Right 08/22/2022    RIGHT SACROILIAC JOINT INJECTION UNDER FLUOROSCOPY performed by Suzanne Kat MD at [address] [place] [address] Right 10/06/2022    #2 RIGHT SACROILIAC JOINT INJECTION UNDER FLUOROSCOPY performed by Suzanne Kat MD at [address] [place] [address] Right 4/24/2023    RIGHT SACROILIAC JOINT INJECTION UNDER FLUOROSCOPY performed by Suzanne Kat MD at [address] [place] [address] Right 07/25/2022    LUMBAR TRANSFORAMINAL EPIDURAL STEROID INJECTION RIGHT L3 AND L4 UNDER FLUOROSCOPIC GUIDANCE performed by Suzanne Kat MD at [address] [place] [address] Right 11/17/2022    RIGHT S1,S2,S3 LATERAL BRANCH AND L5 DR RADIOFREQUENCY ABLATION UNDER FLUOROSCOPIC GUIDANCE performed by Suzanne Kat MD at [address] [place] [address] Right 9/21/2023    LUMBAR EPIDURAL STEROID INJECTION UNDER FLUOROSCOPIC GUIDANCE AT

## 2023-11-13 NOTE — TELEPHONE ENCOUNTER
Per the order of Dr. Manohar Cox, patient has been scheduled for EGD at [facility] on 2023. Patient provided with procedure information during office visit and scheduled for follow up appointment. Patient instructed to please contact our office with any questions. Procedure scheduled through EPIC. Dr. Manohar Cox to enter orders. Prior Authorization Form:      DEMOGRAPHICS:                     Patient Name:  Maxine Michel  Patient :  1955            Insurance:  Payor: MEDICARE / Plan: MEDICARE PART A AND B / Product Type: *No Product type* /   Insurance ID Number:    Payer/Plan Subscr  Sex Relation Sub. Ins. ID Effective Group Num   [facility] 1955 [facility] Self [id number]                                    [address] [address]   2.  [address]  1955 Female Self [id number] PLAN G                                   [address]         DIAGNOSIS & PROCEDURE:                       Procedure/Operation: EGD           CPT Code: 07074    Diagnosis:  Marginal Ulcer    ICD10 Code: K28.9    Location:  [facility]    Surgeon:  Deisy Fowler INFORMATION:                          Date: 2023    Time: TBD              Anesthesia:  MAC/TIVA                                                       Status:  Outpatient        Special Comments:         Electronically signed by Payton Chavez on 2023 at 1:20 PM

## 2023-11-15 ENCOUNTER — PREP FOR PROCEDURE (OUTPATIENT)
Dept: SURGERY | Age: 68
End: 2023-11-15

## 2023-11-15 RX ORDER — SODIUM CHLORIDE, SODIUM LACTATE, POTASSIUM CHLORIDE, CALCIUM CHLORIDE 600; 310; 30; 20 MG/100ML; MG/100ML; MG/100ML; MG/100ML
INJECTION, SOLUTION INTRAVENOUS CONTINUOUS
Status: CANCELLED | OUTPATIENT
Start: 2023-11-15

## 2023-12-05 NOTE — PROGRESS NOTES
[employer] [employer] PRE-ADMISSION TESTING INSTRUCTIONS    The Preadmission Testing patient is instructed accordingly using the following criteria (check applicable):    ARRIVAL INSTRUCTIONS:  [x] Parking the day of Surgery is located in the Main Entrance lot. Upon entering the door, make an immediate right to the surgery reception desk    [x] Bring photo ID and insurance card    [x] Bring in a copy of Living will or Durable Power of  papers. [x] Please be sure to arrange for responsible adult to provide transportation to and from the hospital    [x] Please arrange for responsible adult to be with you for the 24 hour period post procedure due to having anesthesia    [x] If you awake am of surgery not feeling well or have temperature >100 please call [phone]    GENERAL INSTRUCTIONS:    [x] Nothing by mouth after midnight, including gum, candy, mints or water    [x] You may brush your teeth, but do not swallow any water    [x] Take medications as instructed with 1-2 oz of water    [x] Stop herbal supplements and vitamins 5 days prior to procedure    [] Follow preop dosing of blood thinners per physician instructions    [] Take 1/2 dose of evening insulin, but no insulin after midnight    [] No oral diabetic medications after midnight    [] If diabetic and have low blood sugar or feel symptomatic, take 1-2oz apple juice only    [] Bring inhalers day of surgery    [] Bring C-PAP/ Bi-Pap day of surgery    [] Bring urine specimen day of surgery    [x] Shower or bath with soap, lather and rinse well, AM of Surgery, no lotion, powders or creams to surgical site    [] Follow bowel prep as instructed per surgeon    [x] No tobacco products within 24 hours of surgery     [x] No alcohol or illegal drug use within 24 hours of surgery.     [x] Jewelry, body piercing's, eyeglasses, contact lenses and dentures are not permitted into surgery (bring cases)      [x] Please do not wear any nail polish, make up or hair products on the day of surgery    [x] You can expect a call the business day prior to procedure to notify you if your arrival time changes    [x] If you receive a survey after surgery we would greatly appreciate your comments    [] Parent/guardian of a minor must accompany their child and remain on the premises  the entire time they are under our care     [] Pediatric patients may bring favorite toy, blanket or comfort item with them    [] A caregiver or family member must remain with the patient during their stay if they are mentally handicapped, have dementia, disoriented or unable to use a call light or would be a safety concern if left unattended    [x] Please notify surgeon if you develop any illness between now and time of surgery (cold, cough, sore throat, fever, nausea, vomiting) or any signs of infections  including skin, wounds, and dental.    [x]  The Outpatient Pharmacy is available to fill your prescription here on your day of surgery, ask your preop nurse for details    [] Other instructions    EDUCATIONAL MATERIALS PROVIDED:    [] PAT Preoperative Education Packet/Booklet     [] Medication List    [] Transfusion bracelet applied with instructions    [] Shower with soap, lather and rinse well, and use CHG wipes provided the evening before surgery as instructed    [] Incentive spirometer with instructions

## 2023-12-06 ENCOUNTER — ANESTHESIA EVENT (OUTPATIENT)
Dept: ENDOSCOPY | Age: 68
End: 2023-12-06
Payer: MEDICARE

## 2023-12-06 ASSESSMENT — LIFESTYLE VARIABLES: SMOKING_STATUS: 1

## 2023-12-06 NOTE — ANESTHESIA PRE PROCEDURE
sounds clear to auscultation  (+)           current smoker (Medical marijuana)    (-) sleep apnea          Patient did not smoke on day of surgery. Cardiovascular:  Exercise tolerance: good (>4 METS)  (+) hypertension: mild and no interval change, hyperlipidemia    (-) past MI, CAD, CABG/stent, dysrhythmias and  angina    ECG reviewed  Rhythm: regular  Rate: normal           Beta Blocker:  Not on Beta Blocker      ROS comment: EKG:  Normal sinus rhythm  Normal ECG  When compared with ECG of 25-OCT-2023 04:03,  premature ventricular complexes are no longer presen       Neuro/Psych:   (+) neuromuscular disease:             ROS comment: Impingement syndrome of right shoulder  Lumbar radicular pain  Lumbar radiculopathy  Lumbosacral spondylosis without myelopath  Tinnitus     GI/Hepatic/Renal:   (+) GERD: no interval change, PUD (Perforated), renal disease: CRI         ROS comment: History of Odalys-en-y GBP. Endo/Other:    (+) Diabetes (Diet controlled)Type II DM, blood dyscrasia: anemia, arthritis (Chronic back pain, DDD, spinal stenosis, sacroiliac dysfunction): OA. .          Pt had no PAT visit       Abdominal:         (-) obese       Vascular: negative vascular ROS. Other Findings:             Anesthesia Plan      MAC     ASA 3     (Marginal ulcer perforation healed nonoperatively doing well now and improving without issues still on PPI and carafate.)  Induction: intravenous. Anesthetic plan and risks discussed with patient. Plan discussed with CRNA. Nilo Proctor DO   12/6/2023    History, data, and pertinent studies from chart review. Above represents information available via the shared medical record including previous anesthetic, medication, and allergy history.   Confirmation of above and final disposition per DOS anesthesiologist.    Rachelle Yoder DO  Staff Anesthesiologist  December 6, 2023  3:30 PM     DOS STAFF ADDENDUM:    Patient seen and chart

## 2023-12-07 ENCOUNTER — ANESTHESIA (OUTPATIENT)
Dept: ENDOSCOPY | Age: 68
End: 2023-12-07
Payer: MEDICARE

## 2023-12-07 ENCOUNTER — HOSPITAL ENCOUNTER (OUTPATIENT)
Age: 68
Setting detail: OUTPATIENT SURGERY
Discharge: HOME | End: 2023-12-07
Attending: SURGERY | Admitting: SURGERY
Payer: MEDICARE

## 2023-12-07 VITALS
OXYGEN SATURATION: 98 % | HEART RATE: 58 BPM | DIASTOLIC BLOOD PRESSURE: 73 MMHG | HEIGHT: 65 IN | BODY MASS INDEX: 25.99 KG/M2 | WEIGHT: 156 LBS | TEMPERATURE: 97 F | RESPIRATION RATE: 18 BRPM | SYSTOLIC BLOOD PRESSURE: 137 MMHG

## 2023-12-07 DIAGNOSIS — K28.9 MARGINAL ULCER: ICD-10-CM

## 2023-12-07 PROCEDURE — 43239 EGD BIOPSY SINGLE/MULTIPLE: CPT | Performed by: SURGERY

## 2023-12-07 PROCEDURE — 99024 POSTOP FOLLOW-UP VISIT: CPT | Performed by: SURGERY

## 2023-12-07 PROCEDURE — 7100000010 HC PHASE II RECOVERY - FIRST 15 MIN: Performed by: SURGERY

## 2023-12-07 PROCEDURE — 3609012400 HC EGD TRANSORAL BIOPSY SINGLE/MULTIPLE: Performed by: SURGERY

## 2023-12-07 PROCEDURE — 2580000003 HC RX 258: Performed by: REGISTERED NURSE

## 2023-12-07 PROCEDURE — 3700000000 HC ANESTHESIA ATTENDED CARE: Performed by: SURGERY

## 2023-12-07 PROCEDURE — 88305 TISSUE EXAM BY PATHOLOGIST: CPT

## 2023-12-07 PROCEDURE — 7100000011 HC PHASE II RECOVERY - ADDTL 15 MIN: Performed by: SURGERY

## 2023-12-07 PROCEDURE — 2709999900 HC NON-CHARGEABLE SUPPLY: Performed by: SURGERY

## 2023-12-07 PROCEDURE — 6360000002 HC RX W HCPCS: Performed by: REGISTERED NURSE

## 2023-12-07 RX ORDER — OMEPRAZOLE 20 MG/1
20 CAPSULE, DELAYED RELEASE ORAL
Qty: 180 CAPSULE | Refills: 1 | Status: SHIPPED | OUTPATIENT
Start: 2023-12-07

## 2023-12-07 RX ORDER — PROPOFOL 10 MG/ML
INJECTION, EMULSION INTRAVENOUS PRN
Status: DISCONTINUED | OUTPATIENT
Start: 2023-12-07 | End: 2023-12-07 | Stop reason: SDUPTHER

## 2023-12-07 RX ORDER — SODIUM CHLORIDE 9 MG/ML
INJECTION, SOLUTION INTRAVENOUS CONTINUOUS PRN
Status: DISCONTINUED | OUTPATIENT
Start: 2023-12-07 | End: 2023-12-07 | Stop reason: SDUPTHER

## 2023-12-07 RX ORDER — SODIUM CHLORIDE, SODIUM LACTATE, POTASSIUM CHLORIDE, CALCIUM CHLORIDE 600; 310; 30; 20 MG/100ML; MG/100ML; MG/100ML; MG/100ML
INJECTION, SOLUTION INTRAVENOUS CONTINUOUS
Status: DISCONTINUED | OUTPATIENT
Start: 2023-12-07 | End: 2023-12-07 | Stop reason: HOSPADM

## 2023-12-07 RX ADMIN — PROPOFOL 150 MG: 10 INJECTION, EMULSION INTRAVENOUS at 12:47

## 2023-12-07 RX ADMIN — SODIUM CHLORIDE: 9 INJECTION, SOLUTION INTRAVENOUS at 12:47

## 2023-12-07 ASSESSMENT — PAIN SCALES - GENERAL: PAINLEVEL_OUTOF10: 0

## 2023-12-07 ASSESSMENT — PAIN - FUNCTIONAL ASSESSMENT: PAIN_FUNCTIONAL_ASSESSMENT: NONE - DENIES PAIN

## 2023-12-07 NOTE — DISCHARGE INSTRUCTIONS
Upper GI Endoscopy: What to Expect at [address] [place] [address] Halliday had an upper GI endoscopy. Your doctor used a thin, lighted tube that bends to look at the inside of your esophagus, your stomach, and the first part of the small intestine, called the duodenum. After you have an endoscopy, you will stay at the hospital or clinic for 1 to 2 hours. This will allow the medicine to wear off. You will be able to go home after your doctor or nurse checks to make sure that you're not having any problems. You may have to stay overnight if you had treatment during the test. You may have a sore throat for a day or two after the test.  This care sheet gives you a general idea about what to expect after the test.  How can you care for yourself at home? Activity   Rest as much as you need to after you go home. You should be able to go back to your usual activities the day after the test.  Diet   Follow your doctor's directions for eating after the test.  Drink plenty of fluids (unless your doctor has told you not to). Medications   If you have a sore throat the day after the test, use an over-the-counter spray to numb your throat. Follow-up care is a key part of your treatment and safety. Be sure to make and go to all appointments, and call your doctor if you are having problems. It's also a good idea to know your test results and keep a list of the medicines you take. When should you call for help? Call 911 anytime you think you may need emergency care. For example, call if:    You passed out (lost consciousness). You have trouble breathing. You pass maroon or bloody stools. Call your doctor now or seek immediate medical care if:    You have pain that does not get better after your take pain medicine. You have new or worse belly pain. You have blood in your stools. You are sick to your stomach and cannot keep fluids down. You have a fever. You cannot pass stools or gas.    Watch closely

## 2023-12-07 NOTE — ANESTHESIA POSTPROCEDURE EVALUATION
Department of Anesthesiology  Postprocedure Note    Patient: Lexii Kate  MRN: [id number]  YOB: 1955  Date of evaluation: 12/7/2023      Procedure Summary       Date: 12/07/23 Room / Location: [place] [address] / [facility]    Anesthesia Start: 1247 Anesthesia Stop: 1253    Procedure: EGD BIOPSY Diagnosis:       Marginal ulcer      (Marginal ulcer [K28.9])    Surgeons: Na Macias MD Responsible Provider: Dania Benedict DO    Anesthesia Type: MAC ASA Status: 3            Anesthesia Type: No value filed.     Tesfaye Phase I: Tesfaye Score: 10    Tesfaye Phase II: Tesfaye Score: 10      Anesthesia Post Evaluation    Level of consciousness: awake  Pain score: 1  Airway patency: patent  Nausea & Vomiting: no vomiting and no nausea  Complications: no  Cardiovascular status: hemodynamically stable  Respiratory status: acceptable  Hydration status: stable  Pain management: adequate

## 2023-12-07 NOTE — OP NOTE
SURGEON: Margo Vines M.D. PREOPERATIVE DIAGNOSES:  S/p rygb with marginal ulcer    POSTOPERATIVE DIAGNOSES: normal EGD, 3cm hiatal hernia     OPERATION: Wipeuunp-yjyean-nvuqyqomuug with biopsy    BLOOD LOSS: 0ML    ANESTHESIA: LMAC    COMPLICATIONS: None. OPERATIONS: The patient was placed on the table in left lateral decubitus position and sedated. Bite block was placed. A lubricated scope was easily passed into the upper esophagus which looked normal. The distal esophagus looked normal. The scope was passed into the stomach pouch which was 60ml. Biopsy was taken to check for H. pylori. The scope was then passed through the anastomosis which was 12mm and had no path present. Retroflex showed 3cm hiatal hernia. The scope was then withdrawn. The patient tolerated the procedure well.      Physician Signature: Electronically signed by Dr. Anuradha Miguel

## 2023-12-11 LAB — SURGICAL PATHOLOGY REPORT: NORMAL

## 2023-12-11 RX ORDER — OMEPRAZOLE 20 MG/1
20 CAPSULE, DELAYED RELEASE ORAL
Qty: 180 CAPSULE | Refills: 1 | Status: SHIPPED | OUTPATIENT
Start: 2023-12-11

## 2023-12-14 RX ORDER — OMEPRAZOLE 40 MG/1
40 CAPSULE, DELAYED RELEASE ORAL DAILY
Qty: 30 CAPSULE | Refills: 1 | Status: SHIPPED | OUTPATIENT
Start: 2023-12-14

## 2024-01-11 ENCOUNTER — OFFICE VISIT (OUTPATIENT)
Dept: PAIN MANAGEMENT | Age: 69
End: 2024-01-11
Payer: MEDICARE

## 2024-01-11 VITALS
SYSTOLIC BLOOD PRESSURE: 168 MMHG | OXYGEN SATURATION: 96 % | TEMPERATURE: 97.3 F | RESPIRATION RATE: 16 BRPM | DIASTOLIC BLOOD PRESSURE: 64 MMHG | HEART RATE: 66 BPM | HEIGHT: 65 IN | WEIGHT: 160 LBS | BODY MASS INDEX: 26.66 KG/M2

## 2024-01-11 DIAGNOSIS — G57.01 PIRIFORMIS SYNDROME OF RIGHT SIDE: Primary | ICD-10-CM

## 2024-01-11 DIAGNOSIS — M48.061 SPINAL STENOSIS OF LUMBAR REGION, UNSPECIFIED WHETHER NEUROGENIC CLAUDICATION PRESENT: ICD-10-CM

## 2024-01-11 DIAGNOSIS — M53.3 SACROILIAC DYSFUNCTION: ICD-10-CM

## 2024-01-11 DIAGNOSIS — M54.16 LUMBAR RADICULAR PAIN: ICD-10-CM

## 2024-01-11 DIAGNOSIS — Z79.899 MEDICAL MARIJUANA USE: ICD-10-CM

## 2024-01-11 PROCEDURE — 3017F COLORECTAL CA SCREEN DOC REV: CPT | Performed by: ANESTHESIOLOGY

## 2024-01-11 PROCEDURE — 1123F ACP DISCUSS/DSCN MKR DOCD: CPT | Performed by: ANESTHESIOLOGY

## 2024-01-11 PROCEDURE — 1036F TOBACCO NON-USER: CPT | Performed by: ANESTHESIOLOGY

## 2024-01-11 PROCEDURE — G8427 DOCREV CUR MEDS BY ELIG CLIN: HCPCS | Performed by: ANESTHESIOLOGY

## 2024-01-11 PROCEDURE — 99214 OFFICE O/P EST MOD 30 MIN: CPT | Performed by: ANESTHESIOLOGY

## 2024-01-11 PROCEDURE — 3077F SYST BP >= 140 MM HG: CPT | Performed by: ANESTHESIOLOGY

## 2024-01-11 PROCEDURE — G8399 PT W/DXA RESULTS DOCUMENT: HCPCS | Performed by: ANESTHESIOLOGY

## 2024-01-11 PROCEDURE — 99213 OFFICE O/P EST LOW 20 MIN: CPT | Performed by: ANESTHESIOLOGY

## 2024-01-11 PROCEDURE — G8417 CALC BMI ABV UP PARAM F/U: HCPCS | Performed by: ANESTHESIOLOGY

## 2024-01-11 PROCEDURE — 1090F PRES/ABSN URINE INCON ASSESS: CPT | Performed by: ANESTHESIOLOGY

## 2024-01-11 PROCEDURE — G8484 FLU IMMUNIZE NO ADMIN: HCPCS | Performed by: ANESTHESIOLOGY

## 2024-01-11 PROCEDURE — 3078F DIAST BP <80 MM HG: CPT | Performed by: ANESTHESIOLOGY

## 2024-01-11 RX ORDER — SODIUM CHLORIDE 0.9 % (FLUSH) 0.9 %
5-40 SYRINGE (ML) INJECTION EVERY 12 HOURS SCHEDULED
OUTPATIENT
Start: 2024-01-11

## 2024-01-11 RX ORDER — SODIUM CHLORIDE 0.9 % (FLUSH) 0.9 %
5-40 SYRINGE (ML) INJECTION PRN
OUTPATIENT
Start: 2024-01-11

## 2024-01-11 RX ORDER — SODIUM CHLORIDE 9 MG/ML
INJECTION, SOLUTION INTRAVENOUS PRN
OUTPATIENT
Start: 2024-01-11

## 2024-01-11 NOTE — PROGRESS NOTES
Call to Arminda Zuñiga that procedure was approved for 1/22/2024 and that [facility] should call her a few days before for the pre op call and between 2:00 PM and 4:00 PM  the business day before with the arrival time. Instructed Arminda to hold ibuprofen for 24 hours, naprosyn for 4 days and any aspirin containing products or fish oil for 7 days. Instructed to call office back if any questions. Arminda verbalized understanding.    Electronically signed by Faizan Colon RN on 1/11/2024 at 10:39 AM

## 2024-01-11 NOTE — PROGRESS NOTES
[facility]        [address], [place] [address]  Dept: [phone]      Follow up Note      Arminda Zuñiga     Date of Visit:  1/11/2024    CC:  Patient presents for follow up   Chief Complaint   Patient presents with    Follow-up     Low back pain      HPI:  Low back pain over the right lower lumbar area and intermittent right LE pain.     Pain causes functional limitations/ limits Adl's : Yes     Prior treatment at [facility]- in the past.     Has been evaluated buy NS - recommended conservative treatment.    Nursing notes and details of the pain history reviewed. Please see intake notes for details.    Interventions had helped.     Notes:  S/P Gastric bypass surgery.  On Medical Marijuana.     Previous treatments:  Physical Therapy /Chiropractic treatment/ HEP: yes     Medications: - NSAID's : yes - caution due to H/o gastric bypass                       - Membrane stabilizers : yes - gabapentin                       - Opioids : no                       - Adjuvants or Others : yes     Spine Surgeries: no     Anticoagulation medications: no.     H/O Smoking: no  H/O alcohol abuse : no  H/O Illicit drug use : denies. Uses medical marijuana     Employment: retired     Imaging:   MRI of LS spine: 7/11/2022:  Impression   1.  No fracture or bony destructive lesion.   2. Severe central canal stenosis at L3-4.  Moderate stenoses at L1-2 and   L2-3.  Mild stenoses at L4-5 and L5-S1.   3.  Multilevel neural foraminal stenoses, worst (severe) at the left L3-4,   right L4-5 and bilateral L5-S1 levels.   4. Multilevel stenoses of the lateral recess, worse (severe) at the right   L5-S1 level, resulting in significant impingement of the right S1 nerve.      OARRS report:: reviewed.    Past Medical History:   Diagnosis Date    Arthritis     Chronic back pain     Chronic hip pain     Diabetes mellitus (HCC)     no longer on meds since wt loss surgery    Hyperlipidemia

## 2024-01-11 NOTE — H&P (VIEW-ONLY)
omeprazole (PRILOSEC) 20 MG delayed release capsule Take 2 capsules by mouth Daily  Patient taking differently: Take 2 capsules by mouth every morning 10/28/23   Donte Major MD       Allergies   Allergen Reactions    Penicillins Hives and Rash    Sulfa Antibiotics Hives and Rash       Social History     Socioeconomic History    Marital status: Single     Spouse name: Not on file    Number of children: Not on file    Years of education: Not on file    Highest education level: Not on file   Occupational History    Not on file   Tobacco Use    Smoking status: Former     Current packs/day: 0.00     Average packs/day: 1.5 packs/day for 4.0 years (6.0 ttl pk-yrs)     Types: Cigarettes     Start date: 1973     Quit date: 1977     Years since quittin.6    Smokeless tobacco: Never   Vaping Use    Vaping Use: Never used   Substance and Sexual Activity    Alcohol use: Yes     Alcohol/week: 0.8 standard drinks of alcohol     Types: 1 Standard drinks or equivalent per week     Comment: Occasionally    Drug use: Yes     Types: Marijuana (Weed)     Comment: Medical Marijuana (nightly)    Sexual activity: Defer   Other Topics Concern    Not on file   Social History Narrative    Not on file     Social Determinants of Health     Financial Resource Strain: Not on file   Food Insecurity: Not on file   Transportation Needs: Not on file   Physical Activity: Not on file   Stress: Not on file   Social Connections: Not on file   Intimate Partner Violence: Not on file   Housing Stability: Not on file       Family History   Problem Relation Age of Onset    High Blood Pressure Mother     Diabetes Mother     Diabetes Father     High Blood Pressure Father     High Blood Pressure Brother     Diabetes Paternal Grandfather        REVIEW OF SYSTEMS:     Arminda denies fever/chills, chest pain, shortness of breath, new bowel or bladder complaints. All other review of systems was negative.    PHYSICAL EXAMINATION:      BP (!)

## 2024-01-11 NOTE — PROGRESS NOTES
Arminda Zuñiga presents to the [facility] on 1/11/2024. Arminda is complaining of pain in her low back. The pain is constant. The pain is described as aching and stabbing. Pain is rated on her best day at a 5, on her worst day at a 8, and on average at a 6 on the VAS scale. She took her last dose of Celebrex today.     Any procedures since your last visit: No    Pacemaker or defibrillator: No    She is  on NSAIDS and is not on anticoagulation medications.    Medication Contract and Consent for Opioid Use Documents Filed        No documents found                    BP (!) 168/64   Pulse 66   Temp 97.3 °F (36.3 °C) (Infrared)   Resp 16   Ht 1.651 m (5' 5\")   Wt 72.6 kg (160 lb)   SpO2 96%   BMI 26.63 kg/m²      No LMP recorded. Patient has had a hysterectomy.

## 2024-01-16 NOTE — PROGRESS NOTES
[facility] PAIN MANAGEMENT  INSTRUCTIONS  ...........................................................................................................................................     [x] Parking the day of Surgery is located in the Main Entrance lot.  Upon entering the door, make immediate right into the surgery reception room    [x]  Bring photo ID and insurance card     [x] You may have a light breakfast day of procedure    [x]  Wear loose comfortable clothing    [x]  Please follow instructions for medications as given per Dr's office    [x] You can expect a call the business day prior to procedure to notify you of your arrival time     [x] Please arrange for     []  Other instructions

## 2024-01-22 ENCOUNTER — HOSPITAL ENCOUNTER (OUTPATIENT)
Dept: GENERAL RADIOLOGY | Age: 69
Setting detail: OUTPATIENT SURGERY
Discharge: HOME | End: 2024-01-24
Attending: ANESTHESIOLOGY
Payer: MEDICARE

## 2024-01-22 ENCOUNTER — HOSPITAL ENCOUNTER (OUTPATIENT)
Age: 69
Setting detail: OUTPATIENT SURGERY
Discharge: HOME | End: 2024-01-22
Attending: ANESTHESIOLOGY | Admitting: ANESTHESIOLOGY
Payer: MEDICARE

## 2024-01-22 VITALS
DIASTOLIC BLOOD PRESSURE: 78 MMHG | RESPIRATION RATE: 18 BRPM | BODY MASS INDEX: 26.66 KG/M2 | TEMPERATURE: 97.5 F | WEIGHT: 160 LBS | HEART RATE: 58 BPM | HEIGHT: 65 IN | SYSTOLIC BLOOD PRESSURE: 165 MMHG | OXYGEN SATURATION: 100 %

## 2024-01-22 DIAGNOSIS — R52 PAIN MANAGEMENT: ICD-10-CM

## 2024-01-22 LAB — GLUCOSE BLD-MCNC: 214 MG/DL (ref 74–99)

## 2024-01-22 PROCEDURE — 6360000004 HC RX CONTRAST MEDICATION: Performed by: ANESTHESIOLOGY

## 2024-01-22 PROCEDURE — 3600000002 HC SURGERY LEVEL 2 BASE: Performed by: ANESTHESIOLOGY

## 2024-01-22 PROCEDURE — 7100000011 HC PHASE II RECOVERY - ADDTL 15 MIN: Performed by: ANESTHESIOLOGY

## 2024-01-22 PROCEDURE — 2500000003 HC RX 250 WO HCPCS: Performed by: ANESTHESIOLOGY

## 2024-01-22 PROCEDURE — 82962 GLUCOSE BLOOD TEST: CPT

## 2024-01-22 PROCEDURE — 2709999900 HC NON-CHARGEABLE SUPPLY: Performed by: ANESTHESIOLOGY

## 2024-01-22 PROCEDURE — 6360000002 HC RX W HCPCS: Performed by: ANESTHESIOLOGY

## 2024-01-22 PROCEDURE — 3600000012 HC SURGERY LEVEL 2 ADDTL 15MIN: Performed by: ANESTHESIOLOGY

## 2024-01-22 PROCEDURE — 7100000010 HC PHASE II RECOVERY - FIRST 15 MIN: Performed by: ANESTHESIOLOGY

## 2024-01-22 RX ORDER — BUPIVACAINE HYDROCHLORIDE 2.5 MG/ML
INJECTION, SOLUTION EPIDURAL; INFILTRATION; INTRACAUDAL PRN
Status: DISCONTINUED | OUTPATIENT
Start: 2024-01-22 | End: 2024-01-22 | Stop reason: ALTCHOICE

## 2024-01-22 RX ORDER — LIDOCAINE HYDROCHLORIDE 5 MG/ML
INJECTION, SOLUTION INFILTRATION; INTRAVENOUS PRN
Status: DISCONTINUED | OUTPATIENT
Start: 2024-01-22 | End: 2024-01-22 | Stop reason: ALTCHOICE

## 2024-01-22 RX ORDER — METHYLPREDNISOLONE ACETATE 40 MG/ML
INJECTION, SUSPENSION INTRA-ARTICULAR; INTRALESIONAL; INTRAMUSCULAR; SOFT TISSUE PRN
Status: DISCONTINUED | OUTPATIENT
Start: 2024-01-22 | End: 2024-01-22 | Stop reason: ALTCHOICE

## 2024-01-22 RX ORDER — SODIUM CHLORIDE 0.9 % (FLUSH) 0.9 %
5-40 SYRINGE (ML) INJECTION EVERY 12 HOURS SCHEDULED
Status: DISCONTINUED | OUTPATIENT
Start: 2024-01-22 | End: 2024-01-22 | Stop reason: HOSPADM

## 2024-01-22 RX ORDER — SODIUM CHLORIDE 9 MG/ML
INJECTION, SOLUTION INTRAVENOUS PRN
Status: DISCONTINUED | OUTPATIENT
Start: 2024-01-22 | End: 2024-01-22 | Stop reason: HOSPADM

## 2024-01-22 RX ORDER — IOPAMIDOL 612 MG/ML
INJECTION, SOLUTION INTRATHECAL PRN
Status: DISCONTINUED | OUTPATIENT
Start: 2024-01-22 | End: 2024-01-22 | Stop reason: ALTCHOICE

## 2024-01-22 RX ORDER — SODIUM CHLORIDE 0.9 % (FLUSH) 0.9 %
5-40 SYRINGE (ML) INJECTION PRN
Status: DISCONTINUED | OUTPATIENT
Start: 2024-01-22 | End: 2024-01-22 | Stop reason: HOSPADM

## 2024-01-22 ASSESSMENT — PAIN DESCRIPTION - DESCRIPTORS: DESCRIPTORS: ACHING

## 2024-01-22 ASSESSMENT — PAIN - FUNCTIONAL ASSESSMENT: PAIN_FUNCTIONAL_ASSESSMENT: 0-10

## 2024-01-22 NOTE — DISCHARGE INSTRUCTIONS
[facility] Pain Management Department  [place] [address]-[phone]  Dr. Jorge Dunn   Post-Pain Block/Radiofrequency  Home Going Instructions    1-Go home, rest for the remainder of the day  2-Please do not lift over 20 pounds the day of the injection  3-If you received sedation No: alcohol, driving, operating lawn mowers, plows, tractors or other dangerous equipment until next morning. Do not make important decisions or sign legal documents for 24 hours. You may experience light headedness, dizziness, nausea or sleepiness after sedation. Do not stay alone. A responsible adult must be with you for 24 hours. You could be nauseated from the medications you have received. Your IV site may be sore and bruised.    4-No dietary restrictions     5-Resume all medications the same day, blood thinners to be resumed 24 hours after injection if you were instructed to stop any.    6-Keep the surgical site clean and dry, you may shower the next morning and remove the      dressing.     7- No sitz baths, tub baths or hot tubs/swimming for 24 hours.       8- If you have any pain at the injection site(s), application of an ice pack to the area should be       helpful, 20 minutes on/20 minutes off for next 48 hours.  9- Call [facility] immediately at if you develop.  Fever greater than 100.4 F  Have bleeding or drainage from the puncture site  Have progressive Leg/arm numbness and or weakness  Loss of control of bowel and or bladder (wet/soil yourself)  Severe headache with inability to lift head  10-You may return to work the next day

## 2024-01-22 NOTE — OP NOTE
Operative Note      Patient: Arminda Zuñiga  YOB: 1955  MRN: [id number]    Date of Procedure: 2024    Pre-Op Diagnosis Codes:     * Lumbar radiculopathy [M54.16]  Pyriformis syndrome    Post-Op Diagnosis: Same       Procedure(s):  LUMBAR EPIDURAL STEROID INJECTION UNDER FLUOROSCOPIC GUIDANCE AT L5-S1 RIGHT PARAMEDIAN + RIGHT PYRIFORMIS INJECTION UNDER FLUOROSCOPIC GUIDANCE.    Surgeon(s):  Bhupendra Santillan MD    Assistant:   * No surgical staff found *    Anesthesia: Local    Estimated Blood Loss (mL): Minimal    Complications: None    Specimens:   * No specimens in log *    Implants:  * No implants in log *      Drains: * No LDAs found *    Findings: good needle placement        Detailed Description of Procedure:   2024    Patient: Arminda Zuñiga  :  1955  Age:  69 y.o.  Sex:  female     PRE-OPERATIVE DIAGNOSIS:      * Lumbar radiculopathy [M54.16]  Pyriformis syndrome    POST-OPERATIVE DIAGNOSIS: Same.    PROCEDURE:   1) LUMBAR EPIDURAL STEROID INJECTION UNDER FLUOROSCOPIC GUIDANCE AT L5-S1 RIGHT PARAMEDIAN +   2) RIGHT PYRIFORMIS INJECTION UNDER FLUOROSCOPIC GUIDANCE.    SURGEON: Bhupendra Santillan MD    ANESTHESIA: Local    ESTIMATED BLOOD LOSS: None.  ______________________________________________________________________    BRIEF HISTORY:  Arminda Zuñiga comes in today for the above procedure under fluoroscopic guidance. The potential complications of this procedure were discussed with her again today.  She has elected to undergo the aforementioned procedure.    Arminda’s complete History & Physical examination were reviewed in depth, a copy of which is in the chart.      DESCRIPTION OF PROCEDURE:    After confirming written and informed consent, a time-out was performed and Arminda’s name and date of birth, the procedure to be performed as well as the plan for the location of the needle insertion were confirmed.    1) The patient was brought into the procedure

## 2024-01-22 NOTE — INTERVAL H&P NOTE
Update History & Physical    The patient's History and Physical of January 11, 2024 was reviewed with the patient and I examined the patient. There was no change. The surgical site was confirmed by the patient and me.     Plan: The risks, benefits, expected outcome, and alternative to the recommended procedure have been discussed with the patient. Patient understands and wants to proceed with the procedure.     Electronically signed by Bhupendra Santillan MD on 1/22/2024

## 2024-01-22 NOTE — PROGRESS NOTES
Discharge instructions provided to patient and family member, verbalize understanding.   Respiratory

## 2024-04-11 ENCOUNTER — OFFICE VISIT (OUTPATIENT)
Dept: PAIN MANAGEMENT | Age: 69
End: 2024-04-11
Payer: MEDICARE

## 2024-04-11 VITALS
HEIGHT: 65 IN | TEMPERATURE: 97.4 F | BODY MASS INDEX: 26.67 KG/M2 | DIASTOLIC BLOOD PRESSURE: 74 MMHG | WEIGHT: 160.05 LBS | HEART RATE: 57 BPM | OXYGEN SATURATION: 99 % | SYSTOLIC BLOOD PRESSURE: 177 MMHG | RESPIRATION RATE: 16 BRPM

## 2024-04-11 DIAGNOSIS — M48.061 SPINAL STENOSIS OF LUMBAR REGION, UNSPECIFIED WHETHER NEUROGENIC CLAUDICATION PRESENT: ICD-10-CM

## 2024-04-11 DIAGNOSIS — M53.3 SACROILIAC DYSFUNCTION: ICD-10-CM

## 2024-04-11 DIAGNOSIS — M54.16 LUMBAR RADICULAR PAIN: ICD-10-CM

## 2024-04-11 DIAGNOSIS — M51.36 DDD (DEGENERATIVE DISC DISEASE), LUMBAR: Primary | ICD-10-CM

## 2024-04-11 PROCEDURE — G8417 CALC BMI ABV UP PARAM F/U: HCPCS | Performed by: ANESTHESIOLOGY

## 2024-04-11 PROCEDURE — 1036F TOBACCO NON-USER: CPT | Performed by: ANESTHESIOLOGY

## 2024-04-11 PROCEDURE — 3017F COLORECTAL CA SCREEN DOC REV: CPT | Performed by: ANESTHESIOLOGY

## 2024-04-11 PROCEDURE — G8427 DOCREV CUR MEDS BY ELIG CLIN: HCPCS | Performed by: ANESTHESIOLOGY

## 2024-04-11 PROCEDURE — 1123F ACP DISCUSS/DSCN MKR DOCD: CPT | Performed by: ANESTHESIOLOGY

## 2024-04-11 PROCEDURE — 99214 OFFICE O/P EST MOD 30 MIN: CPT

## 2024-04-11 PROCEDURE — 3078F DIAST BP <80 MM HG: CPT | Performed by: ANESTHESIOLOGY

## 2024-04-11 PROCEDURE — 3077F SYST BP >= 140 MM HG: CPT | Performed by: ANESTHESIOLOGY

## 2024-04-11 PROCEDURE — 99214 OFFICE O/P EST MOD 30 MIN: CPT | Performed by: ANESTHESIOLOGY

## 2024-04-11 PROCEDURE — G8399 PT W/DXA RESULTS DOCUMENT: HCPCS | Performed by: ANESTHESIOLOGY

## 2024-04-11 PROCEDURE — 1090F PRES/ABSN URINE INCON ASSESS: CPT | Performed by: ANESTHESIOLOGY

## 2024-04-11 RX ORDER — GABAPENTIN 100 MG/1
100 CAPSULE ORAL 2 TIMES DAILY
Qty: 60 CAPSULE | Refills: 1 | Status: SHIPPED | OUTPATIENT
Start: 2024-04-11 | End: 2024-06-10

## 2024-04-11 NOTE — PROGRESS NOTES
Arminda Zuñiga presents to the [facility] on 4/11/2024. Arminda is complaining of pain right glute. The pain is constant. The pain is described as aching and stabbing. Pain is rated on her best day at a 4, on her worst day at a 10, and on average at a 8 on the VAS scale. She took her last dose of  baclofen  PRN.     Any procedures since your last visit: Yes, LUMBAR EPIDURAL STEROID INJECTION UNDER FLUOROSCOPIC GUIDANCE AT L5-S1 RIGHT PARAMEDIAN + RIGHT PYRIFORMIS INJECTION UNDER FLUOROSCOPIC GUIDANCE with 10 % relief.    Pacemaker or defibrillator: No.    She is  on NSAIDS and is not on anticoagulation medications to include none.     Medication Contract and Consent for Opioid Use Documents Filed        No documents found                    BP (!) 177/74   Pulse 57   Temp 97.4 °F (36.3 °C) (Infrared)   Resp 16   Ht 1.651 m (5' 5\")   Wt 72.6 kg (160 lb 0.9 oz)   SpO2 99%   BMI 26.63 kg/m²      No LMP recorded. Patient has had a hysterectomy.  
(Infrared)   Resp 16   Ht 1.651 m (5' 5\")   Wt 72.6 kg (160 lb 0.9 oz)   SpO2 99%   BMI 26.63 kg/m²   General:       General appearance:  Pleasant and well-hydrated, in no distress and A & O x 3  Build:Overweight  Function: Rises from seated position easily     HEENT:     Head:normocephalic, atraumatic    Lungs:     Breathing:normal breathing pattern     CVS:     RRR     Abdomen:     Shape:non-distended and normal     Cervical spine:     Inspection:normal     Thoracic spine:                Spine inspection:normal     Lumbar spine:     Spine inspection: Normal  Palpation: Tenderness paravertebral muscles No bilaterally  Range of motion: Decreased, flexion Decreased, Lateral bending, extension and rotation bilaterally reduced.  Sacroiliac joint tenderness right +  Piriformis tenderness: + right  SLR : negative bilaterally     Musculoskeletal:     Trigger points No      Extremities:     Tremors:None bilaterally upper and lower  Edema:no     Neurological:     Sensory: Normal to light touch     Motor: NO new changes     Dermatology:     Skin:no rashes or lesions noted     Assessment/Plan:      Diagnosis Orders   1. DDD (degenerative disc disease), lumbar        2. Lumbar radicular pain        3. Sacroiliac dysfunction        4. Spinal stenosis of lumbar region, unspecified whether neurogenic claudication present            69 y.o. female with H/o low back pain and right LE pain.  Failed conservative treatment     MRI LS spine : multi level DDD/ stenosis/ foraminal narrowing.     Has been evaluated by NSG- referred for interventions     S/P Gastric bypass : caution with NSAID's    Had Right SIJ pain. S/P Radiofrequency ablation of Right S1, S2, S3 lateral branch & L5 DR on 11/17/2022 with > 60-70% relief.    NOTE: On medical marijuana gummies prn.    Has right gluteal and LE pain.  Had Pyriformis Tenderness Right +    S/P Right LESI L5-S1 (right paramedian approach) + right pyriformis injection on 1/22/2024 with >

## 2024-06-06 RX ORDER — GABAPENTIN 100 MG/1
100 CAPSULE ORAL 2 TIMES DAILY
Qty: 60 CAPSULE | Refills: 1 | OUTPATIENT
Start: 2024-06-06

## 2024-06-24 ENCOUNTER — OFFICE VISIT (OUTPATIENT)
Dept: PAIN MANAGEMENT | Age: 69
End: 2024-06-24
Payer: MEDICARE

## 2024-06-24 VITALS
DIASTOLIC BLOOD PRESSURE: 73 MMHG | OXYGEN SATURATION: 98 % | HEIGHT: 65 IN | HEART RATE: 60 BPM | RESPIRATION RATE: 16 BRPM | BODY MASS INDEX: 26.67 KG/M2 | WEIGHT: 160.05 LBS | TEMPERATURE: 98.1 F | SYSTOLIC BLOOD PRESSURE: 127 MMHG

## 2024-06-24 DIAGNOSIS — M48.061 SPINAL STENOSIS OF LUMBAR REGION, UNSPECIFIED WHETHER NEUROGENIC CLAUDICATION PRESENT: ICD-10-CM

## 2024-06-24 DIAGNOSIS — M54.16 LUMBAR RADICULAR PAIN: Primary | ICD-10-CM

## 2024-06-24 DIAGNOSIS — M53.3 SACROILIAC DYSFUNCTION: ICD-10-CM

## 2024-06-24 DIAGNOSIS — M51.36 DDD (DEGENERATIVE DISC DISEASE), LUMBAR: ICD-10-CM

## 2024-06-24 PROCEDURE — 1090F PRES/ABSN URINE INCON ASSESS: CPT | Performed by: ANESTHESIOLOGY

## 2024-06-24 PROCEDURE — 3017F COLORECTAL CA SCREEN DOC REV: CPT | Performed by: ANESTHESIOLOGY

## 2024-06-24 PROCEDURE — 3074F SYST BP LT 130 MM HG: CPT | Performed by: ANESTHESIOLOGY

## 2024-06-24 PROCEDURE — 1036F TOBACCO NON-USER: CPT | Performed by: ANESTHESIOLOGY

## 2024-06-24 PROCEDURE — 3078F DIAST BP <80 MM HG: CPT | Performed by: ANESTHESIOLOGY

## 2024-06-24 PROCEDURE — G8399 PT W/DXA RESULTS DOCUMENT: HCPCS | Performed by: ANESTHESIOLOGY

## 2024-06-24 PROCEDURE — 99214 OFFICE O/P EST MOD 30 MIN: CPT

## 2024-06-24 PROCEDURE — G8427 DOCREV CUR MEDS BY ELIG CLIN: HCPCS | Performed by: ANESTHESIOLOGY

## 2024-06-24 PROCEDURE — G8417 CALC BMI ABV UP PARAM F/U: HCPCS | Performed by: ANESTHESIOLOGY

## 2024-06-24 PROCEDURE — 99214 OFFICE O/P EST MOD 30 MIN: CPT | Performed by: ANESTHESIOLOGY

## 2024-06-24 PROCEDURE — 1123F ACP DISCUSS/DSCN MKR DOCD: CPT | Performed by: ANESTHESIOLOGY

## 2024-06-24 RX ORDER — GABAPENTIN 300 MG/1
300 CAPSULE ORAL 3 TIMES DAILY
Qty: 90 CAPSULE | Refills: 2 | Status: SHIPPED | OUTPATIENT
Start: 2024-06-24 | End: 2024-09-22

## 2024-06-24 NOTE — PROGRESS NOTES
Arminda Zuñiga presents to the [facility] on 6/24/2024. Arminda is complaining of pain right glute radiating to right leg. The pain is constant. The pain is described as aching, burning and stabbing. Pain is rated on her best day at a 4, on her worst day at a 10, and on average at a 9 on the VAS scale. She took her last dose of  Neurontin  today.     Any procedures since your last visit: No.    Pacemaker or defibrillator: No.    She is on NSAIDS and is not on anticoagulation medications to include none.     Medication Contract and Consent for Opioid Use Documents Filed        No documents found                    /73   Pulse 60   Temp 98.1 °F (36.7 °C) (Infrared)   Resp 16   Ht 1.651 m (5' 5\")   Wt 72.6 kg (160 lb 0.9 oz)   SpO2 98%   BMI 26.63 kg/m²      No LMP recorded. Patient has had a hysterectomy.  
Bariatric   Yes Provider, MD Jossy   gabapentin (NEURONTIN) 100 MG capsule Take 1 capsule by mouth 2 times daily for 60 days. 4/11/24 6/10/24  Bhupendra Santillan MD       Allergies   Allergen Reactions    Penicillins Hives and Rash    Sulfa Antibiotics Hives and Rash       Social History     Socioeconomic History    Marital status: Single     Spouse name: Not on file    Number of children: Not on file    Years of education: Not on file    Highest education level: Not on file   Occupational History    Not on file   Tobacco Use    Smoking status: Former     Current packs/day: 0.00     Average packs/day: 1.5 packs/day for 4.0 years (6.0 ttl pk-yrs)     Types: Cigarettes     Start date: 1973     Quit date: 1977     Years since quittin.1    Smokeless tobacco: Never   Vaping Use    Vaping Use: Never used   Substance and Sexual Activity    Alcohol use: Yes     Alcohol/week: 0.8 standard drinks of alcohol     Types: 1 Standard drinks or equivalent per week     Comment: Occasionally    Drug use: Yes     Types: Marijuana (Weed)     Comment: Medical Marijuana (nightly)    Sexual activity: Defer   Other Topics Concern    Not on file   Social History Narrative    Not on file     Social Determinants of Health     Financial Resource Strain: Not on file   Food Insecurity: Not on file   Transportation Needs: Not on file   Physical Activity: Not on file   Stress: Not on file   Social Connections: Not on file   Intimate Partner Violence: Not on file   Housing Stability: Not on file       Family History   Problem Relation Age of Onset    High Blood Pressure Mother     Diabetes Mother     Diabetes Father     High Blood Pressure Father     High Blood Pressure Brother     Diabetes Paternal Grandfather        REVIEW OF SYSTEMS:     Arminda denies fever/chills, chest pain, shortness of breath, new bowel or bladder complaints. All other review of systems was negative.    PHYSICAL EXAMINATION:      /73   Pulse

## 2024-09-30 ENCOUNTER — OFFICE VISIT (OUTPATIENT)
Dept: PAIN MANAGEMENT | Age: 69
End: 2024-09-30
Payer: MEDICARE

## 2024-09-30 VITALS
SYSTOLIC BLOOD PRESSURE: 172 MMHG | WEIGHT: 160 LBS | DIASTOLIC BLOOD PRESSURE: 80 MMHG | HEART RATE: 61 BPM | BODY MASS INDEX: 26.66 KG/M2 | TEMPERATURE: 97.2 F | HEIGHT: 65 IN | RESPIRATION RATE: 16 BRPM | OXYGEN SATURATION: 98 %

## 2024-09-30 DIAGNOSIS — M53.3 SACROILIAC DYSFUNCTION: ICD-10-CM

## 2024-09-30 DIAGNOSIS — Z98.84 H/O GASTRIC BYPASS: ICD-10-CM

## 2024-09-30 DIAGNOSIS — M48.061 SPINAL STENOSIS OF LUMBAR REGION, UNSPECIFIED WHETHER NEUROGENIC CLAUDICATION PRESENT: Primary | ICD-10-CM

## 2024-09-30 DIAGNOSIS — M51.369 DDD (DEGENERATIVE DISC DISEASE), LUMBAR: ICD-10-CM

## 2024-09-30 DIAGNOSIS — M47.817 LUMBOSACRAL SPONDYLOSIS WITHOUT MYELOPATHY: ICD-10-CM

## 2024-09-30 DIAGNOSIS — M54.16 LUMBAR RADICULAR PAIN: ICD-10-CM

## 2024-09-30 PROCEDURE — 1090F PRES/ABSN URINE INCON ASSESS: CPT | Performed by: ANESTHESIOLOGY

## 2024-09-30 PROCEDURE — G8427 DOCREV CUR MEDS BY ELIG CLIN: HCPCS | Performed by: ANESTHESIOLOGY

## 2024-09-30 PROCEDURE — G8399 PT W/DXA RESULTS DOCUMENT: HCPCS | Performed by: ANESTHESIOLOGY

## 2024-09-30 PROCEDURE — 3017F COLORECTAL CA SCREEN DOC REV: CPT | Performed by: ANESTHESIOLOGY

## 2024-09-30 PROCEDURE — 1123F ACP DISCUSS/DSCN MKR DOCD: CPT | Performed by: ANESTHESIOLOGY

## 2024-09-30 PROCEDURE — 99214 OFFICE O/P EST MOD 30 MIN: CPT

## 2024-09-30 PROCEDURE — 99214 OFFICE O/P EST MOD 30 MIN: CPT | Performed by: ANESTHESIOLOGY

## 2024-09-30 PROCEDURE — G8417 CALC BMI ABV UP PARAM F/U: HCPCS | Performed by: ANESTHESIOLOGY

## 2024-09-30 PROCEDURE — 3077F SYST BP >= 140 MM HG: CPT | Performed by: ANESTHESIOLOGY

## 2024-09-30 PROCEDURE — 3079F DIAST BP 80-89 MM HG: CPT | Performed by: ANESTHESIOLOGY

## 2024-09-30 PROCEDURE — 1036F TOBACCO NON-USER: CPT | Performed by: ANESTHESIOLOGY

## 2024-09-30 RX ORDER — MELOXICAM 15 MG/1
TABLET ORAL
COMMUNITY
Start: 2024-07-18

## 2024-09-30 RX ORDER — COLCHICINE 0.6 MG/1
0.6 TABLET ORAL DAILY
COMMUNITY
Start: 2024-09-26

## 2024-09-30 RX ORDER — ALENDRONATE SODIUM 70 MG/1
TABLET ORAL
COMMUNITY
Start: 2024-07-18

## 2024-09-30 RX ORDER — PREGABALIN 50 MG/1
50 CAPSULE ORAL 3 TIMES DAILY
Qty: 90 CAPSULE | Refills: 2 | Status: SHIPPED | OUTPATIENT
Start: 2024-09-30 | End: 2024-12-29

## 2024-09-30 NOTE — PROGRESS NOTES
[facility]        [address], [place] [address]  Dept: [phone]      Follow up Note      Arminda Zuñiga     Date of Visit:  9/30/2024    CC:  Patient presents for follow up   Chief Complaint   Patient presents with    Lower Back Pain     HPI:  Low back pain over the right lower lumbar area and intermittent right LE pain.     Pain causes functional limitations/ limits Adl's : Yes     Prior treatment at [facility]- in the past.     Has been evaluated buy NSG - recommended conservative treatment.    Nursing notes and details of the pain history reviewed. Please see intake notes for details.    Interventions had helped.     Notes:  S/P Gastric bypass surgery.  On Medical Marijuana.     Previous treatments:  Physical Therapy /Chiropractic treatment/ HEP: yes     Medications: - NSAID's : yes - caution due to H/o gastric bypass                       - Membrane stabilizers : yes - gabapentin                       - Opioids : no                       - Adjuvants or Others : yes     Spine Surgeries: no     Anticoagulation medications: no.     H/O Smoking: no  H/O alcohol abuse : no  H/O Illicit drug use : denies. Uses medical marijuana     Employment: retired     Imaging:   MRI of LS spine: 7/11/2022:  Impression   1.  No fracture or bony destructive lesion.   2. Severe central canal stenosis at L3-4.  Moderate stenoses at L1-2 and   L2-3.  Mild stenoses at L4-5 and L5-S1.   3.  Multilevel neural foraminal stenoses, worst (severe) at the left L3-4,   right L4-5 and bilateral L5-S1 levels.   4. Multilevel stenoses of the lateral recess, worse (severe) at the right   L5-S1 level, resulting in significant impingement of the right S1 nerve.      OARRS report:: reviewed.    Past Medical History:   Diagnosis Date    Arthritis     Chronic back pain     Chronic hip pain     Diabetes mellitus (HCC)     no longer on meds since wt loss surgery    Hearing loss     Hyperlipidemia

## 2024-09-30 NOTE — PROGRESS NOTES
Arminda Zuñiga presents to the [facility] on 9/30/2024. Arminda is complaining of pain low back right side. The pain is constant. The pain is described as stabbing and constant ache, radiating down her right leg. Pain is rated on her best day at a 3, on her worst day at a 10, and on average at a 8 on the VAS scale. She took her last dose of Neurontin this morning.     Any procedures since your last visit: No,     Pacemaker or defibrillator: No    She is not on NSAIDS and is not on anticoagulation medications to include none and is managed by na     Medication Contract and Consent for Opioid Use Documents Filed        No documents found                    BP (!) 172/80   Pulse 61   Temp 97.2 °F (36.2 °C)   Resp 16   Ht 1.651 m (5' 5\")   Wt 72.6 kg (160 lb)   SpO2 98%   BMI 26.63 kg/m²      No LMP recorded. Patient has had a hysterectomy.

## 2024-12-11 ENCOUNTER — OFFICE VISIT (OUTPATIENT)
Dept: PAIN MANAGEMENT | Age: 69
End: 2024-12-11
Payer: MEDICARE

## 2024-12-11 VITALS
DIASTOLIC BLOOD PRESSURE: 82 MMHG | BODY MASS INDEX: 26.66 KG/M2 | HEIGHT: 65 IN | RESPIRATION RATE: 16 BRPM | HEART RATE: 61 BPM | TEMPERATURE: 98.4 F | OXYGEN SATURATION: 95 % | WEIGHT: 160 LBS | SYSTOLIC BLOOD PRESSURE: 143 MMHG

## 2024-12-11 DIAGNOSIS — M47.817 LUMBOSACRAL SPONDYLOSIS WITHOUT MYELOPATHY: Primary | ICD-10-CM

## 2024-12-11 DIAGNOSIS — M51.369 DEGENERATION OF INTERVERTEBRAL DISC OF LUMBAR REGION, UNSPECIFIED WHETHER PAIN PRESENT: ICD-10-CM

## 2024-12-11 DIAGNOSIS — M48.061 SPINAL STENOSIS OF LUMBAR REGION, UNSPECIFIED WHETHER NEUROGENIC CLAUDICATION PRESENT: ICD-10-CM

## 2024-12-11 DIAGNOSIS — M79.2 NEURALGIA AND NEURITIS: ICD-10-CM

## 2024-12-11 PROCEDURE — G8427 DOCREV CUR MEDS BY ELIG CLIN: HCPCS | Performed by: ANESTHESIOLOGY

## 2024-12-11 PROCEDURE — 3079F DIAST BP 80-89 MM HG: CPT | Performed by: ANESTHESIOLOGY

## 2024-12-11 PROCEDURE — 1036F TOBACCO NON-USER: CPT | Performed by: ANESTHESIOLOGY

## 2024-12-11 PROCEDURE — G8417 CALC BMI ABV UP PARAM F/U: HCPCS | Performed by: ANESTHESIOLOGY

## 2024-12-11 PROCEDURE — 99214 OFFICE O/P EST MOD 30 MIN: CPT | Performed by: ANESTHESIOLOGY

## 2024-12-11 PROCEDURE — 1090F PRES/ABSN URINE INCON ASSESS: CPT | Performed by: ANESTHESIOLOGY

## 2024-12-11 PROCEDURE — G8399 PT W/DXA RESULTS DOCUMENT: HCPCS | Performed by: ANESTHESIOLOGY

## 2024-12-11 PROCEDURE — G8484 FLU IMMUNIZE NO ADMIN: HCPCS | Performed by: ANESTHESIOLOGY

## 2024-12-11 PROCEDURE — 1159F MED LIST DOCD IN RCRD: CPT | Performed by: ANESTHESIOLOGY

## 2024-12-11 PROCEDURE — 3077F SYST BP >= 140 MM HG: CPT | Performed by: ANESTHESIOLOGY

## 2024-12-11 PROCEDURE — 1123F ACP DISCUSS/DSCN MKR DOCD: CPT | Performed by: ANESTHESIOLOGY

## 2024-12-11 PROCEDURE — 3017F COLORECTAL CA SCREEN DOC REV: CPT | Performed by: ANESTHESIOLOGY

## 2024-12-11 RX ORDER — PREGABALIN 75 MG/1
75 CAPSULE ORAL 3 TIMES DAILY
Qty: 90 CAPSULE | Refills: 1 | Status: SHIPPED | OUTPATIENT
Start: 2024-12-11 | End: 2025-02-09

## 2024-12-11 NOTE — PROGRESS NOTES
[facility]        [address], [place] [address]  Dept: [phone]    Follow up Note      Arminda Zuñiga     Date of Visit:  12/11/2024    CC:  Patient presents for follow up   Chief Complaint   Patient presents with    Follow-up     Follow up. Lyrica is working better     HPI:  Low back pain over the right lower lumbar area and intermittent right LE pain.     Pain causes functional limitations/ limits Adl's : Yes     Prior treatment at [facility]- in the past.     Has been evaluated buy NS - recommended conservative treatment.    Nursing notes and details of the pain history reviewed. Please see intake notes for details.    Interventions had helped.     Notes:  S/P Gastric bypass surgery.  On Medical Marijuana.     Previous treatments:  Physical Therapy /Chiropractic treatment/ HEP: yes     Medications: - NSAID's : yes - caution due to H/o gastric bypass                       - Membrane stabilizers : yes - gabapentin                       - Opioids : no                       - Adjuvants or Others : yes     Spine Surgeries: no     Anticoagulation medications: no.     H/O Smoking: no  H/O alcohol abuse : no  H/O Illicit drug use : denies. Uses medical marijuana     Employment: retired     Imaging:   MRI of LS spine: 7/11/2022:  Impression   1.  No fracture or bony destructive lesion.   2. Severe central canal stenosis at L3-4.  Moderate stenoses at L1-2 and   L2-3.  Mild stenoses at L4-5 and L5-S1.   3.  Multilevel neural foraminal stenoses, worst (severe) at the left L3-4,   right L4-5 and bilateral L5-S1 levels.   4. Multilevel stenoses of the lateral recess, worse (severe) at the right   L5-S1 level, resulting in significant impingement of the right S1 nerve.      OARRS report:: reviewed.    Past Medical History:   Diagnosis Date    Arthritis     Chronic back pain     Chronic hip pain     Diabetes mellitus (HCC)     no longer on meds since wt loss surgery    Hearing

## 2024-12-11 NOTE — PROGRESS NOTES
Arminda Zuñiga presents to the [facility] on 12/11/2024. Arminda is complaining of pain in lower back, right hip and buttocks. The pain is intermittent. The pain is described as aching and stabbing. Pain is rated on her best day at a 0, on her worst day at a 10, and on average at a 5 on the VAS scale. She took her last dose of Lyrica and baclofen   .     Any procedures since your last visit: No    Pacemaker or defibrillator: No managed by na.    She is not on NSAIDS and is not on anticoagulation medications to include none and is managed by na.     Do you want someone present when the provider examines you? No    Medication Contract and Consent for Opioid Use Documents Filed        No documents found                    There were no vitals taken for this visit.     No LMP recorded. Patient has had a hysterectomy.

## 2025-01-07 ENCOUNTER — TELEPHONE (OUTPATIENT)
Dept: PAIN MANAGEMENT | Age: 70
End: 2025-01-07

## 2025-01-07 DIAGNOSIS — M79.2 NEURALGIA AND NEURITIS: ICD-10-CM

## 2025-01-07 DIAGNOSIS — M48.061 SPINAL STENOSIS OF LUMBAR REGION, UNSPECIFIED WHETHER NEUROGENIC CLAUDICATION PRESENT: ICD-10-CM

## 2025-01-07 RX ORDER — PREGABALIN 75 MG/1
75 CAPSULE ORAL 3 TIMES DAILY
Qty: 90 CAPSULE | Refills: 3 | Status: SHIPPED | OUTPATIENT
Start: 2025-01-07 | End: 2025-05-07

## 2025-01-07 NOTE — TELEPHONE ENCOUNTER
Arminda Zuñiga called in and stated that her pharmacy has changed to [facility] and she will need Pregabalin 75 mg TID # [id number] RF e-scribed.  I changed her pharmacy in her chart.  Please advise.

## 2025-01-09 ENCOUNTER — TELEPHONE (OUTPATIENT)
Dept: PAIN MANAGEMENT | Age: 70
End: 2025-01-09

## 2025-01-09 DIAGNOSIS — M48.061 SPINAL STENOSIS OF LUMBAR REGION, UNSPECIFIED WHETHER NEUROGENIC CLAUDICATION PRESENT: ICD-10-CM

## 2025-01-09 DIAGNOSIS — M51.369 DEGENERATION OF INTERVERTEBRAL DISC OF LUMBAR REGION, UNSPECIFIED WHETHER PAIN PRESENT: ICD-10-CM

## 2025-01-09 DIAGNOSIS — M25.551 PAIN OF RIGHT HIP: Primary | ICD-10-CM

## 2025-01-09 NOTE — TELEPHONE ENCOUNTER
Arminda Zuñiga  Patient would like to get her MRI done, but would also like to get an xray of her right hip done as she fell before Tricia. Could you add this order to her chart? Please advise

## 2025-01-30 ENCOUNTER — HOSPITAL ENCOUNTER (OUTPATIENT)
Dept: GENERAL RADIOLOGY | Age: 70
Discharge: HOME | End: 2025-02-01
Attending: ANESTHESIOLOGY
Payer: MEDICARE

## 2025-01-30 ENCOUNTER — HOSPITAL ENCOUNTER (OUTPATIENT)
Age: 70
Discharge: HOME | End: 2025-02-01
Attending: ANESTHESIOLOGY
Payer: MEDICARE

## 2025-01-30 ENCOUNTER — HOSPITAL ENCOUNTER (OUTPATIENT)
Dept: MRI IMAGING | Age: 70
Discharge: HOME | End: 2025-02-01
Attending: ANESTHESIOLOGY
Payer: MEDICARE

## 2025-01-30 DIAGNOSIS — M51.369 DEGENERATION OF INTERVERTEBRAL DISC OF LUMBAR REGION, UNSPECIFIED WHETHER PAIN PRESENT: ICD-10-CM

## 2025-01-30 DIAGNOSIS — M48.061 SPINAL STENOSIS OF LUMBAR REGION, UNSPECIFIED WHETHER NEUROGENIC CLAUDICATION PRESENT: ICD-10-CM

## 2025-01-30 DIAGNOSIS — M25.551 PAIN OF RIGHT HIP: ICD-10-CM

## 2025-01-30 PROCEDURE — 72148 MRI LUMBAR SPINE W/O DYE: CPT

## 2025-01-30 PROCEDURE — 73502 X-RAY EXAM HIP UNI 2-3 VIEWS: CPT

## 2025-01-31 ENCOUNTER — TELEPHONE (OUTPATIENT)
Dept: PAIN MANAGEMENT | Age: 70
End: 2025-01-31

## 2025-01-31 ENCOUNTER — HOSPITAL ENCOUNTER (OUTPATIENT)
Dept: MRI IMAGING | Age: 70
Discharge: HOME | End: 2025-01-31
Attending: ANESTHESIOLOGY
Payer: MEDICARE

## 2025-01-31 ENCOUNTER — HOSPITAL ENCOUNTER (OUTPATIENT)
Age: 70
Discharge: HOME | End: 2025-01-31
Attending: ANESTHESIOLOGY
Payer: MEDICARE

## 2025-01-31 DIAGNOSIS — M51.369 DEGENERATION OF INTERVERTEBRAL DISC OF LUMBAR REGION, UNSPECIFIED WHETHER PAIN PRESENT: ICD-10-CM

## 2025-01-31 DIAGNOSIS — M51.369 DEGENERATION OF INTERVERTEBRAL DISC OF LUMBAR REGION, UNSPECIFIED WHETHER PAIN PRESENT: Primary | ICD-10-CM

## 2025-01-31 DIAGNOSIS — M48.061 SPINAL STENOSIS OF LUMBAR REGION, UNSPECIFIED WHETHER NEUROGENIC CLAUDICATION PRESENT: ICD-10-CM

## 2025-01-31 DIAGNOSIS — M47.817 LUMBOSACRAL SPONDYLOSIS WITHOUT MYELOPATHY: ICD-10-CM

## 2025-01-31 DIAGNOSIS — M79.2 NEURALGIA AND NEURITIS: ICD-10-CM

## 2025-01-31 LAB
BASOPHILS # BLD: 0.04 K/UL (ref 0–0.2)
BASOPHILS NFR BLD: 1 % (ref 0–2)
BUN SERPL-MCNC: 24 MG/DL (ref 6–23)
CREAT SERPL-MCNC: 1.2 MG/DL (ref 0.5–1)
CRP SERPL HS-MCNC: <3 MG/L (ref 0–5)
EOSINOPHIL # BLD: 0.16 K/UL (ref 0.05–0.5)
EOSINOPHILS RELATIVE PERCENT: 3 % (ref 0–6)
ERYTHROCYTE [DISTWIDTH] IN BLOOD BY AUTOMATED COUNT: 13.2 % (ref 11.5–15)
ERYTHROCYTE [SEDIMENTATION RATE] IN BLOOD BY WESTERGREN METHOD: 26 MM/HR (ref 0–20)
GFR, ESTIMATED: 50 ML/MIN/1.73M2
HCT VFR BLD AUTO: 34.6 % (ref 34–48)
HGB BLD-MCNC: 11.1 G/DL (ref 11.5–15.5)
IMM GRANULOCYTES # BLD AUTO: <0.03 K/UL (ref 0–0.58)
IMM GRANULOCYTES NFR BLD: 0 % (ref 0–5)
LYMPHOCYTES NFR BLD: 1.72 K/UL (ref 1.5–4)
LYMPHOCYTES RELATIVE PERCENT: 33 % (ref 20–42)
MCH RBC QN AUTO: 30.8 PG (ref 26–35)
MCHC RBC AUTO-ENTMCNC: 32.1 G/DL (ref 32–34.5)
MCV RBC AUTO: 96.1 FL (ref 80–99.9)
MONOCYTES NFR BLD: 0.4 K/UL (ref 0.1–0.95)
MONOCYTES NFR BLD: 8 % (ref 2–12)
NEUTROPHILS NFR BLD: 55 % (ref 43–80)
NEUTS SEG NFR BLD: 2.85 K/UL (ref 1.8–7.3)
PLATELET # BLD AUTO: 224 K/UL (ref 130–450)
PMV BLD AUTO: 9.8 FL (ref 7–12)
RBC # BLD AUTO: 3.6 M/UL (ref 3.5–5.5)
WBC OTHER # BLD: 5.2 K/UL (ref 4.5–11.5)

## 2025-01-31 PROCEDURE — 84520 ASSAY OF UREA NITROGEN: CPT

## 2025-01-31 PROCEDURE — A9579 GAD-BASE MR CONTRAST NOS,1ML: HCPCS | Performed by: RADIOLOGY

## 2025-01-31 PROCEDURE — 86140 C-REACTIVE PROTEIN: CPT

## 2025-01-31 PROCEDURE — 36415 COLL VENOUS BLD VENIPUNCTURE: CPT

## 2025-01-31 PROCEDURE — 85652 RBC SED RATE AUTOMATED: CPT

## 2025-01-31 PROCEDURE — 72149 MRI LUMBAR SPINE W/DYE: CPT

## 2025-01-31 PROCEDURE — 6360000004 HC RX CONTRAST MEDICATION: Performed by: RADIOLOGY

## 2025-01-31 PROCEDURE — 82565 ASSAY OF CREATININE: CPT

## 2025-01-31 PROCEDURE — 85025 COMPLETE CBC W/AUTO DIFF WBC: CPT

## 2025-01-31 RX ADMIN — GADOTERIDOL 15 ML: 279.3 INJECTION, SOLUTION INTRAVENOUS at 15:06

## 2025-01-31 NOTE — TELEPHONE ENCOUNTER
Message from radiology about the changes noted on MRI.  Spoke with the patient. Patient denies fever/ chills / night sweat/ other red flag symptoms.    Will order:  - MRI W/ Wo contrast.  - CBC  ESR/ CRP.     Informed the patient.    Patient appreciated the call.    Bhupendra Santillan MD

## 2025-03-03 ENCOUNTER — OFFICE VISIT (OUTPATIENT)
Dept: PAIN MANAGEMENT | Age: 70
End: 2025-03-03
Payer: MEDICARE

## 2025-03-03 VITALS
HEART RATE: 66 BPM | WEIGHT: 160 LBS | HEIGHT: 65 IN | TEMPERATURE: 97.3 F | SYSTOLIC BLOOD PRESSURE: 150 MMHG | BODY MASS INDEX: 26.66 KG/M2 | RESPIRATION RATE: 16 BRPM | DIASTOLIC BLOOD PRESSURE: 69 MMHG | OXYGEN SATURATION: 98 %

## 2025-03-03 DIAGNOSIS — M53.3 SACROILIAC DYSFUNCTION: ICD-10-CM

## 2025-03-03 DIAGNOSIS — M47.817 LUMBOSACRAL SPONDYLOSIS WITHOUT MYELOPATHY: ICD-10-CM

## 2025-03-03 DIAGNOSIS — M16.11 OSTEOARTHRITIS OF ONE HIP, RIGHT: ICD-10-CM

## 2025-03-03 DIAGNOSIS — Z98.84 S/P GASTRIC BYPASS: ICD-10-CM

## 2025-03-03 DIAGNOSIS — M48.061 SPINAL STENOSIS OF LUMBAR REGION, UNSPECIFIED WHETHER NEUROGENIC CLAUDICATION PRESENT: Primary | ICD-10-CM

## 2025-03-03 DIAGNOSIS — M51.369 DEGENERATION OF INTERVERTEBRAL DISC OF LUMBAR REGION, UNSPECIFIED WHETHER PAIN PRESENT: ICD-10-CM

## 2025-03-03 PROCEDURE — G8417 CALC BMI ABV UP PARAM F/U: HCPCS | Performed by: ANESTHESIOLOGY

## 2025-03-03 PROCEDURE — 1090F PRES/ABSN URINE INCON ASSESS: CPT | Performed by: ANESTHESIOLOGY

## 2025-03-03 PROCEDURE — 3077F SYST BP >= 140 MM HG: CPT | Performed by: ANESTHESIOLOGY

## 2025-03-03 PROCEDURE — 1036F TOBACCO NON-USER: CPT | Performed by: ANESTHESIOLOGY

## 2025-03-03 PROCEDURE — 99214 OFFICE O/P EST MOD 30 MIN: CPT | Performed by: ANESTHESIOLOGY

## 2025-03-03 PROCEDURE — 3017F COLORECTAL CA SCREEN DOC REV: CPT | Performed by: ANESTHESIOLOGY

## 2025-03-03 PROCEDURE — G8427 DOCREV CUR MEDS BY ELIG CLIN: HCPCS | Performed by: ANESTHESIOLOGY

## 2025-03-03 PROCEDURE — G8399 PT W/DXA RESULTS DOCUMENT: HCPCS | Performed by: ANESTHESIOLOGY

## 2025-03-03 PROCEDURE — 1159F MED LIST DOCD IN RCRD: CPT | Performed by: ANESTHESIOLOGY

## 2025-03-03 PROCEDURE — 1123F ACP DISCUSS/DSCN MKR DOCD: CPT | Performed by: ANESTHESIOLOGY

## 2025-03-03 PROCEDURE — 3078F DIAST BP <80 MM HG: CPT | Performed by: ANESTHESIOLOGY

## 2025-03-03 NOTE — PROGRESS NOTES
Arminda Zuñiga presents to the [facility] on 3/3/2025. Arminda is complaining of pain in her ow back. The pain is constant. The pain is described as aching, sharp, and tender. Pain is rated on her best day at a 4, on her worst day at a 10, and on average at a 7 on the VAS scale. She took her last dose of Lyrica today.     Any procedures since your last visit: No    Pacemaker or defibrillator: No    She is  on NSAIDS and is not on anticoagulation medications.    Do you want someone present when the provider examines you? No    Arminda's blood pressure was elevated today. She was instructed to contact his primary care provider as soon as possible.    Medication Contract and Consent for Opioid Use Documents Filed        No documents found                    BP (!) 146/55   Pulse 66   Temp 97.3 °F (36.3 °C) (Infrared)   Resp 16   Ht 1.651 m (5' 5\")   Wt 72.6 kg (160 lb)   SpO2 98%   BMI 26.63 kg/m²      No LMP recorded. Patient has had a hysterectomy.

## 2025-03-03 NOTE — PROGRESS NOTES
[facility]        [address] [place] [id number]  Dept: [phone]    Follow up Note      Arminda Zuñiga     Date of Visit:  3/3/2025    CC:  Patient presents for follow up   Chief Complaint   Patient presents with    Follow-up     Low back pain      HPI:  Low back pain over the right lower lumbar area and intermittent right LE pain.     Pain causes functional limitations/ limits Adl's : Yes     Prior treatment at [facility]- in the past.     Has been evaluated buy NS - recommended conservative treatment.    Nursing notes and details of the pain history reviewed. Please see intake notes for details.    Interventions had helped for short duration.     Notes:  S/P Gastric bypass surgery.  On Medical Marijuana.     Previous treatments:  Physical Therapy /Chiropractic treatment/ HEP: yes     Medications: - NSAID's : yes - caution due to H/o gastric bypass                       - Membrane stabilizers : yes - gabapentin                       - Opioids : no                       - Adjuvants or Others : yes     Spine Surgeries: no     Anticoagulation medications: no.     H/O Smoking: no  H/O alcohol abuse : no  H/O Illicit drug use : denies. Uses medical marijuana     Employment: retired     Imaging:   MRI of LS spine W contrast: 1/31/2025:  FINDINGS:  BONES/ALIGNMENT:  No fracture or joint dislocation.  The vertebral body  heights are grossly preserved.  Marrow edema and enhancement in the left  lateral aspect of the L2 and L3 vertebral bodies.  Enhancement is seen in the  left psoas muscle at the level of L2 and L3.  No intradiscal fluid is seen.  No abscess noted.     Marrow edema in the right lateral endplates at L5-S1 is likely degenerative  (Modic type 1).     SOFT TISSUES: No paraspinal mass or abscess seen.     DEGENERATIVE CHANGES: Please refer to the dedicated unenhanced MRI of the  lumbar spine from 01/30/2025, which includes T2 weighted images and allow for  better

## 2025-04-02 ENCOUNTER — OFFICE VISIT (OUTPATIENT)
Dept: NEUROSURGERY | Age: 70
End: 2025-04-02
Payer: MEDICARE

## 2025-04-02 VITALS
SYSTOLIC BLOOD PRESSURE: 167 MMHG | DIASTOLIC BLOOD PRESSURE: 77 MMHG | HEIGHT: 63 IN | BODY MASS INDEX: 30.48 KG/M2 | OXYGEN SATURATION: 98 % | WEIGHT: 172 LBS | HEART RATE: 68 BPM | RESPIRATION RATE: 16 BRPM

## 2025-04-02 DIAGNOSIS — M48.062 LUMBAR STENOSIS WITH NEUROGENIC CLAUDICATION: Primary | ICD-10-CM

## 2025-04-02 PROCEDURE — 1090F PRES/ABSN URINE INCON ASSESS: CPT | Performed by: NEUROLOGICAL SURGERY

## 2025-04-02 PROCEDURE — G8399 PT W/DXA RESULTS DOCUMENT: HCPCS | Performed by: NEUROLOGICAL SURGERY

## 2025-04-02 PROCEDURE — 3077F SYST BP >= 140 MM HG: CPT | Performed by: NEUROLOGICAL SURGERY

## 2025-04-02 PROCEDURE — 1125F AMNT PAIN NOTED PAIN PRSNT: CPT | Performed by: NEUROLOGICAL SURGERY

## 2025-04-02 PROCEDURE — 3017F COLORECTAL CA SCREEN DOC REV: CPT | Performed by: NEUROLOGICAL SURGERY

## 2025-04-02 PROCEDURE — 1036F TOBACCO NON-USER: CPT | Performed by: NEUROLOGICAL SURGERY

## 2025-04-02 PROCEDURE — 1123F ACP DISCUSS/DSCN MKR DOCD: CPT | Performed by: NEUROLOGICAL SURGERY

## 2025-04-02 PROCEDURE — 99212 OFFICE O/P EST SF 10 MIN: CPT

## 2025-04-02 PROCEDURE — 99212 OFFICE O/P EST SF 10 MIN: CPT | Performed by: NEUROLOGICAL SURGERY

## 2025-04-02 PROCEDURE — G8417 CALC BMI ABV UP PARAM F/U: HCPCS | Performed by: NEUROLOGICAL SURGERY

## 2025-04-02 PROCEDURE — 1159F MED LIST DOCD IN RCRD: CPT | Performed by: NEUROLOGICAL SURGERY

## 2025-04-02 PROCEDURE — G8427 DOCREV CUR MEDS BY ELIG CLIN: HCPCS | Performed by: NEUROLOGICAL SURGERY

## 2025-04-02 PROCEDURE — 3078F DIAST BP <80 MM HG: CPT | Performed by: NEUROLOGICAL SURGERY

## 2025-04-02 NOTE — PROGRESS NOTES
Patient is here for follow up consult for: back and bilateral leg pain. The pain is rated as 6/10.      Physical exam  Alert and Oriented X3  PERRLA, EOMI  LISA 5/5  Sensation intact to LT and PP  Reflexes are 2+ and symmetric    A/P: patient is here for follow up for: back and leg pain.  She has tried physical therapy and epidural injections.  Her MRI shows stenosis fro L1-S1 with L3-L4 and L4-L5 spondylolisthesis.  I advised her that surgery would consist of L1-S1 laminectomy and fusion.  She wishes to hold off at this time and will call if things get worse    Darrius Newby MD

## 2025-04-08 ENCOUNTER — PREP FOR PROCEDURE (OUTPATIENT)
Dept: PAIN MANAGEMENT | Age: 70
End: 2025-04-08

## 2025-04-08 DIAGNOSIS — M16.11 OSTEOARTHRITIS OF ONE HIP, RIGHT: Primary | ICD-10-CM

## 2025-04-14 PROBLEM — M16.11 OSTEOARTHRITIS OF RIGHT HIP: Status: ACTIVE | Noted: 2025-04-08

## 2025-04-17 RX ORDER — SEMAGLUTIDE 1.34 MG/ML
INJECTION, SOLUTION SUBCUTANEOUS WEEKLY
COMMUNITY

## 2025-04-17 NOTE — PROGRESS NOTES
[facility] PAIN MANAGEMENT  INSTRUCTIONS  ...........................................................................................................................................     [x] Parking the day of Surgery is located in the Main Entrance lot.  Upon entering the door, make immediate right into the surgery reception room    [x]  Bring photo ID and insurance card     [x] You may have a light breakfast day of procedure    [x]  Wear loose comfortable clothing    [x]  Please follow instructions for medications as given per Dr's office    [x] You can expect a call the business day prior to procedure to notify you of your arrival time     [x] Please arrange for     []  Other instructions

## 2025-04-21 ENCOUNTER — HOSPITAL ENCOUNTER (OUTPATIENT)
Dept: GENERAL RADIOLOGY | Age: 70
Setting detail: OUTPATIENT SURGERY
Discharge: HOME | End: 2025-04-23
Attending: ANESTHESIOLOGY
Payer: MEDICARE

## 2025-04-21 ENCOUNTER — HOSPITAL ENCOUNTER (OUTPATIENT)
Age: 70
Setting detail: OUTPATIENT SURGERY
Discharge: HOME | End: 2025-04-21
Attending: ANESTHESIOLOGY | Admitting: ANESTHESIOLOGY
Payer: MEDICARE

## 2025-04-21 VITALS
DIASTOLIC BLOOD PRESSURE: 82 MMHG | HEART RATE: 58 BPM | WEIGHT: 170 LBS | HEIGHT: 63 IN | OXYGEN SATURATION: 98 % | RESPIRATION RATE: 16 BRPM | BODY MASS INDEX: 30.12 KG/M2 | SYSTOLIC BLOOD PRESSURE: 174 MMHG | TEMPERATURE: 97.5 F

## 2025-04-21 DIAGNOSIS — R52 PAIN: ICD-10-CM

## 2025-04-21 LAB — GLUCOSE BLD-MCNC: 174 MG/DL (ref 74–99)

## 2025-04-21 PROCEDURE — 7100000011 HC PHASE II RECOVERY - ADDTL 15 MIN: Performed by: ANESTHESIOLOGY

## 2025-04-21 PROCEDURE — 6360000002 HC RX W HCPCS: Performed by: ANESTHESIOLOGY

## 2025-04-21 PROCEDURE — 6360000004 HC RX CONTRAST MEDICATION: Performed by: ANESTHESIOLOGY

## 2025-04-21 PROCEDURE — 2709999900 HC NON-CHARGEABLE SUPPLY: Performed by: ANESTHESIOLOGY

## 2025-04-21 PROCEDURE — 82962 GLUCOSE BLOOD TEST: CPT

## 2025-04-21 PROCEDURE — 7100000010 HC PHASE II RECOVERY - FIRST 15 MIN: Performed by: ANESTHESIOLOGY

## 2025-04-21 PROCEDURE — 3600000002 HC SURGERY LEVEL 2 BASE: Performed by: ANESTHESIOLOGY

## 2025-04-21 PROCEDURE — 20610 DRAIN/INJ JOINT/BURSA W/O US: CPT | Performed by: ANESTHESIOLOGY

## 2025-04-21 PROCEDURE — 77002 NEEDLE LOCALIZATION BY XRAY: CPT | Performed by: ANESTHESIOLOGY

## 2025-04-21 RX ORDER — IOPAMIDOL 612 MG/ML
INJECTION, SOLUTION INTRATHECAL PRN
Status: DISCONTINUED | OUTPATIENT
Start: 2025-04-21 | End: 2025-04-21 | Stop reason: ALTCHOICE

## 2025-04-21 RX ORDER — LIDOCAINE HYDROCHLORIDE 5 MG/ML
INJECTION, SOLUTION INFILTRATION; INTRAVENOUS PRN
Status: DISCONTINUED | OUTPATIENT
Start: 2025-04-21 | End: 2025-04-21 | Stop reason: ALTCHOICE

## 2025-04-21 RX ORDER — BUPIVACAINE HYDROCHLORIDE 2.5 MG/ML
INJECTION, SOLUTION EPIDURAL; INFILTRATION; INTRACAUDAL; PERINEURAL PRN
Status: DISCONTINUED | OUTPATIENT
Start: 2025-04-21 | End: 2025-04-21 | Stop reason: ALTCHOICE

## 2025-04-21 RX ORDER — METHYLPREDNISOLONE ACETATE 40 MG/ML
INJECTION, SUSPENSION INTRA-ARTICULAR; INTRALESIONAL; INTRAMUSCULAR; SOFT TISSUE PRN
Status: DISCONTINUED | OUTPATIENT
Start: 2025-04-21 | End: 2025-04-21 | Stop reason: ALTCHOICE

## 2025-04-21 ASSESSMENT — PAIN DESCRIPTION - ORIENTATION: ORIENTATION: RIGHT

## 2025-04-21 ASSESSMENT — PAIN DESCRIPTION - ONSET: ONSET: ON-GOING

## 2025-04-21 ASSESSMENT — PAIN DESCRIPTION - PAIN TYPE: TYPE: CHRONIC PAIN

## 2025-04-21 ASSESSMENT — PAIN DESCRIPTION - DESCRIPTORS: DESCRIPTORS: PATIENT UNABLE TO DESCRIBE

## 2025-04-21 ASSESSMENT — PAIN SCALES - GENERAL: PAINLEVEL_OUTOF10: 3

## 2025-04-21 ASSESSMENT — PAIN DESCRIPTION - DIRECTION: RADIATING_TOWARDS: R GROIN

## 2025-04-21 ASSESSMENT — PAIN DESCRIPTION - FREQUENCY: FREQUENCY: INTERMITTENT

## 2025-04-21 ASSESSMENT — PAIN DESCRIPTION - LOCATION: LOCATION: GROIN

## 2025-04-21 NOTE — PROGRESS NOTES
Patient states she has a DNR. Checked our records and did not see it scanned in. Requested copy from patient.

## 2025-04-21 NOTE — H&P
ProviderJossy MD   medical marijuana Take by mouth as needed. Gummies  / Honey    Jossy Gage MD   atorvastatin (LIPITOR) 40 MG tablet Take 1 tablet by mouth daily    Jossy Gage MD   ketotifen (ZADITOR) 0.025 % ophthalmic solution Place 1 drop into both eyes daily    Jossy Gage MD   Cholecalciferol (VITAMIN D PO) Take 5,000 Units by mouth daily Bariatric    ProviderJossy MD       Allergies   Allergen Reactions    Alendronate Sodium Hives, Itching and Nausea Only    Meloxicam Hives and Itching    Penicillins Hives and Rash    Sulfa Antibiotics Hives and Rash       Social History     Socioeconomic History    Marital status: Single     Spouse name: Not on file    Number of children: Not on file    Years of education: Not on file    Highest education level: Not on file   Occupational History    Not on file   Tobacco Use    Smoking status: Former     Current packs/day: 0.00     Average packs/day: 1.5 packs/day for 4.0 years (6.0 ttl pk-yrs)     Types: Cigarettes     Start date: 1973     Quit date: 1977     Years since quittin.9     Passive exposure: Past    Smokeless tobacco: Never   Vaping Use    Vaping status: Never Used   Substance and Sexual Activity    Alcohol use: Yes     Alcohol/week: 0.8 standard drinks of alcohol     Types: 1 Standard drinks or equivalent per week     Comment: Occasionally    Drug use: Yes     Types: Marijuana (Weed)     Comment: Medical Marijuana (nightly)    Sexual activity: Defer   Other Topics Concern    Not on file   Social History Narrative    Not on file     Social Drivers of Health     Financial Resource Strain: Not on file   Food Insecurity: Not on file   Transportation Needs: Not on file   Physical Activity: Not on file   Stress: Not on file   Social Connections: Not on file   Intimate Partner Violence: Not on file   Housing Stability: Not on file       Family History   Problem Relation Age of Onset    High Blood Pressure

## 2025-04-21 NOTE — DISCHARGE INSTRUCTIONS
[facility] Pain Management Department  [phone]  Dr. Jacque Dunn   Post-Pain Block/Radiofrequency  Home Going Instructions    1-Go home, rest for the remainder of the day  2-Please do not lift over 20 pounds the day of the injection  3-If you received sedation No: alcohol, driving, operating lawn mowers, plows, tractors or other dangerous equipment until next morning. Do not make important decisions or sign legal documents for 24 hours. You may experience light headedness, dizziness, nausea or sleepiness after sedation. Do not stay alone. A responsible adult must be with you for 24 hours. You could be nauseated from the medications you have received. Your IV site may be sore and bruised.    4-No dietary restrictions     5-Resume all medications the same day, blood thinners to be resumed 24 hours after injection if you were instructed to stop any.    6-Keep the surgical site clean and dry, you may shower the next morning and remove the      dressing.     7- No sitz baths, tub baths or hot tubs/swimming for 24 hours.       8- If you have any pain at the injection site(s), application of an ice pack to the area should be       helpful, 20 minutes on/20 minutes off for next 48 hours.  9- Call [facility] immediately at if you develop.  Fever greater than 100.4 F  Have bleeding or drainage from the puncture site  Have progressive Leg/arm numbness and or weakness  Loss of control of bowel and or bladder (wet/soil yourself)  Severe headache with inability to lift head  10-You may return to work the next day

## 2025-04-21 NOTE — OP NOTE
Operative Note      Patient: Arminda Zuñiga  YOB: 1955  MRN: [id number]    Date of Procedure: 2025    Pre-Op Diagnosis Codes:      * Osteoarthritis of right hip [M16.11]    Post-Op Diagnosis: Same       Procedure(s):  RIGHT HIP INJECTION UNDER FLUOROSCOPIC GUIDANCE    Surgeon(s):  Bhupendra Santillan MD    Assistant:   * No surgical staff found *    Anesthesia: Local    Estimated Blood Loss (mL): Minimal    Complications: None    Specimens:   * No specimens in log *    Implants:  * No implants in log *      Drains: * No LDAs found *    Findings:  Infection Present At Time Of Surgery (PATOS) (choose all levels that have infection present):  No infection present  Other Findings: good needle placement    Detailed Description of Procedure:   2025    Patient: Arminda Zuñiga  :  1955  Age:  70 y.o.  Sex:  female     PRE-OPERATIVE DIAGNOSIS: Right Hip osteoarthritis, hip pain.    POST-OPERATIVE DIAGNOSIS: Same.    PROCEDURE PERFORMED: Right Hip steroid injection under fluoroscopic guidance.    SURGEON:   Bhupendar Santillan MD    ANESTHESIA: local    ESTIMATED BLOOD LOSS: None.    BRIEF HISTORY: Arminda Zuñiga comes in today for Right hip steroid injection under fluoroscopic guidance. After discussing the potential risks and benefits of the procedure with the patient  Arminda did request that we proceed. A complete History & Physical was reviewed and it is unchanged.    DESCRIPTION OF PROCEDURE:     After confirming written and informed consent, a time-out was performed and Arminda’s name and date of birth, the procedure to be performed as well as the plan for the location of the needle insertion were confirmed.    Patient was brought into the procedure room and was placed in the supine position on the fluoroscopy table. Standard monitors were placed and vital signs were observed throughout the procedure  The area of the Right hip was prepped with chloraprep and draped in a

## 2025-05-08 ENCOUNTER — OFFICE VISIT (OUTPATIENT)
Dept: PAIN MANAGEMENT | Age: 70
End: 2025-05-08
Payer: MEDICARE

## 2025-05-08 VITALS
HEIGHT: 63 IN | RESPIRATION RATE: 16 BRPM | TEMPERATURE: 97.8 F | HEART RATE: 67 BPM | BODY MASS INDEX: 30.12 KG/M2 | DIASTOLIC BLOOD PRESSURE: 82 MMHG | OXYGEN SATURATION: 98 % | WEIGHT: 170 LBS | SYSTOLIC BLOOD PRESSURE: 135 MMHG

## 2025-05-08 DIAGNOSIS — M79.2 NEURALGIA AND NEURITIS: ICD-10-CM

## 2025-05-08 DIAGNOSIS — M47.817 LUMBOSACRAL SPONDYLOSIS WITHOUT MYELOPATHY: ICD-10-CM

## 2025-05-08 DIAGNOSIS — M48.061 SPINAL STENOSIS OF LUMBAR REGION, UNSPECIFIED WHETHER NEUROGENIC CLAUDICATION PRESENT: Primary | ICD-10-CM

## 2025-05-08 DIAGNOSIS — M16.11 PRIMARY OSTEOARTHRITIS OF RIGHT HIP: ICD-10-CM

## 2025-05-08 DIAGNOSIS — M51.369 DEGENERATION OF INTERVERTEBRAL DISC OF LUMBAR REGION, UNSPECIFIED WHETHER PAIN PRESENT: ICD-10-CM

## 2025-05-08 DIAGNOSIS — M54.16 LUMBAR RADICULAR PAIN: ICD-10-CM

## 2025-05-08 PROCEDURE — 3075F SYST BP GE 130 - 139MM HG: CPT | Performed by: ANESTHESIOLOGY

## 2025-05-08 PROCEDURE — 1036F TOBACCO NON-USER: CPT | Performed by: ANESTHESIOLOGY

## 2025-05-08 PROCEDURE — 1123F ACP DISCUSS/DSCN MKR DOCD: CPT | Performed by: ANESTHESIOLOGY

## 2025-05-08 PROCEDURE — 3017F COLORECTAL CA SCREEN DOC REV: CPT | Performed by: ANESTHESIOLOGY

## 2025-05-08 PROCEDURE — 1090F PRES/ABSN URINE INCON ASSESS: CPT | Performed by: ANESTHESIOLOGY

## 2025-05-08 PROCEDURE — G8399 PT W/DXA RESULTS DOCUMENT: HCPCS | Performed by: ANESTHESIOLOGY

## 2025-05-08 PROCEDURE — 1159F MED LIST DOCD IN RCRD: CPT | Performed by: ANESTHESIOLOGY

## 2025-05-08 PROCEDURE — G8427 DOCREV CUR MEDS BY ELIG CLIN: HCPCS | Performed by: ANESTHESIOLOGY

## 2025-05-08 PROCEDURE — 99213 OFFICE O/P EST LOW 20 MIN: CPT | Performed by: ANESTHESIOLOGY

## 2025-05-08 PROCEDURE — G8417 CALC BMI ABV UP PARAM F/U: HCPCS | Performed by: ANESTHESIOLOGY

## 2025-05-08 PROCEDURE — 3079F DIAST BP 80-89 MM HG: CPT | Performed by: ANESTHESIOLOGY

## 2025-05-08 RX ORDER — ASPIRIN 81 MG/1
81 TABLET ORAL DAILY
COMMUNITY

## 2025-05-08 RX ORDER — PREGABALIN 75 MG/1
75 CAPSULE ORAL 3 TIMES DAILY
Qty: 90 CAPSULE | Refills: 4 | Status: SHIPPED | OUTPATIENT
Start: 2025-05-08 | End: 2025-10-05

## 2025-05-08 RX ORDER — METHYLPREDNISOLONE 4 MG/1
TABLET ORAL
Qty: 1 KIT | Refills: 0 | Status: SHIPPED | OUTPATIENT
Start: 2025-05-08 | End: 2025-05-14

## 2025-05-08 NOTE — PROGRESS NOTES
Arminda Zuñiga presents to the [facility] on 5/8/2025. Arminda is complaining of pain in lower back and right hip. The pain is constant. The pain is described as aching and dull. Pain is rated on her best day at a 3, on her worst day at a 10, and on average at a 7 on the VAS scale. She took her last dose of Lyrica this morning.     Any procedures since your last visit: Yes, with 75 % relief.    Pacemaker or defibrillator: No     She is not on NSAIDS and is  on anticoagulation medications to include ASA and is managed by herself.     Do you want someone present when the provider examines you? No    Medication Contract and Consent for Opioid Use Documents Filed        No documents found                    /82   Pulse 67   Temp 97.8 °F (36.6 °C) (Infrared)   Resp 16   Ht 1.6 m (5' 3\")   Wt 77.1 kg (170 lb)   SpO2 98%   BMI 30.11 kg/m²      No LMP recorded. Patient has had a hysterectomy.

## 2025-05-08 NOTE — PROGRESS NOTES
[facility]        [address] [place] [id number]  Dept: [phone]    Follow up Note      Arminda Zuñiga     Date of Visit:  5/8/2025    CC:  Patient presents for follow up   Chief Complaint   Patient presents with    Follow-up     RIGHT HIP INJECTION UNDER FLUOROSCOPIC GUIDANCE     HPI:  Low back pain over the right lower lumbar area and intermittent right LE pain.     Pain causes functional limitations/ limits Adl's : Yes     Prior treatment at [facility]- in the past.     Has been evaluated buy NS - recommended conservative treatment.    Nursing notes and details of the pain history reviewed. Please see intake notes for details.    Interventions had helped for short duration.     Notes:  S/P Gastric bypass surgery.  On Medical Marijuana.     Previous treatments:  Physical Therapy /Chiropractic treatment/ HEP: yes     Medications: - NSAID's : yes - caution due to H/o gastric bypass                       - Membrane stabilizers : yes - gabapentin                       - Opioids : no                       - Adjuvants or Others : yes     Spine Surgeries: no     Anticoagulation medications: no.     H/O Smoking: no  H/O alcohol abuse : no  H/O Illicit drug use : denies. Uses medical marijuana     Employment: retired     Imaging:   MRI of LS spine W contrast: 1/31/2025:  FINDINGS:  BONES/ALIGNMENT:  No fracture or joint dislocation.  The vertebral body  heights are grossly preserved.  Marrow edema and enhancement in the left  lateral aspect of the L2 and L3 vertebral bodies.  Enhancement is seen in the  left psoas muscle at the level of L2 and L3.  No intradiscal fluid is seen.  No abscess noted.     Marrow edema in the right lateral endplates at L5-S1 is likely degenerative  (Modic type 1).     SOFT TISSUES: No paraspinal mass or abscess seen.     DEGENERATIVE CHANGES: Please refer to the dedicated unenhanced MRI of the  lumbar spine from 01/30/2025, which includes T2

## 2025-06-09 ENCOUNTER — TELEPHONE (OUTPATIENT)
Dept: NEUROSURGERY | Age: 70
End: 2025-06-09

## 2025-06-09 NOTE — TELEPHONE ENCOUNTER
Patient called. She wants to schedule surgery for late September or October.  Told her I will tell Nadine and she will contact you after she speaks to .  It will be later this week most likely.

## 2025-07-24 PROBLEM — M43.16 SPONDYLOLISTHESIS OF LUMBAR REGION: Status: ACTIVE | Noted: 2025-07-24

## 2025-07-24 PROBLEM — M48.061 LUMBAR SPINAL STENOSIS: Status: ACTIVE | Noted: 2025-07-24

## 2025-08-11 DIAGNOSIS — M43.16 SPONDYLOLISTHESIS OF LUMBAR REGION: Primary | ICD-10-CM

## 2025-08-21 ENCOUNTER — TELEPHONE (OUTPATIENT)
Age: 70
End: 2025-08-21

## (undated) DEVICE — Device: Brand: PORTEX

## (undated) DEVICE — NEEDLE HYPO 18GA L1.5IN PNK POLYPR HUB S STL THN WALL FILL

## (undated) DEVICE — 3M™ RED DOT™ MONITORING ELECTRODE WITH FOAM TAPE AND STICKY GEL 2560, 50/BAG, 20/CASE, 72/PLT: Brand: RED DOT™

## (undated) DEVICE — GLOVE ORANGE PI 7 1/2   MSG9075

## (undated) DEVICE — GAUZE,SPONGE,4"X4",12PLY,STERILE,LF,2'S: Brand: MEDLINE

## (undated) DEVICE — NEEDLE HYPO 25GA L1.5IN BLU POLYPR HUB S STL REG BVL STR

## (undated) DEVICE — GAUZE,SPONGE,4"X4",8PLY,STRL,LF,10/TRAY: Brand: MEDLINE

## (undated) DEVICE — 6 ML SYRINGE LUER-LOCK TIP: Brand: MONOJECT

## (undated) DEVICE — SYRINGE, LUER LOCK, 5ML: Brand: MEDLINE

## (undated) DEVICE — GRADUATE TRIANG MEASURE 1000ML BLK PRNT

## (undated) DEVICE — Z DISCONTINUED APPLICATOR SURG PREP 0.35OZ 2% CHG 70% ISO ALC W/ HI LT

## (undated) DEVICE — NEEDLE HYPO 18GA L1.5IN PNK POLYPR HUB S STL REG BVL STR

## (undated) DEVICE — NON-DEHP CATHETER EXTENSION SET, MALE LUER LOCK ADAPTER

## (undated) DEVICE — 12 ML SYRINGE,LUER-LOCK TIP: Brand: MONOJECT

## (undated) DEVICE — BANDAGE ADH W1XL3IN NAT FAB WVN FLX DURABLE N ADH PD SEAL

## (undated) DEVICE — SYRINGE MED 5ML STD CLR PLAS LUERLOCK TIP N CTRL DISP

## (undated) DEVICE — SINGLE-USE BIOPSY FORCEPS: Brand: RADIAL JAW 4

## (undated) DEVICE — SPONGE GZ W4XL4IN RAYON POLY CVR W/NONWOVEN FAB STRL 2/PK

## (undated) DEVICE — BLOCK BITE 60FR RUBBER ADLT DENTAL